# Patient Record
Sex: MALE | Race: WHITE | Employment: OTHER | URBAN - METROPOLITAN AREA
[De-identification: names, ages, dates, MRNs, and addresses within clinical notes are randomized per-mention and may not be internally consistent; named-entity substitution may affect disease eponyms.]

---

## 2017-01-08 ASSESSMENT — ACTIVITIES OF DAILY LIVING (ADL)
DO_MEMBERS_OF_YOUR_HOUSEHOLD_WEAR_SEAT_BELTS?: Y
ARE_THERE_SMOKE_DETECTORS_IN_YOUR_HOME?: Y
DO_MEMBERS_OF_YOUR_HOUSEHOLD_USE_SAFETY_HELMETS?: Y
ARE_THERE_FIREARMS_IN_YOUR_HOME?: N
DO_MEMBERS_OF_YOUR_HOUSEHOLD_USE_SUNSCREEN?: Y
ARE_THERE_CARBON_MONOXIDE_DETECTORS_IN_YOUR_HOME?: Y

## 2017-01-08 ASSESSMENT — ENCOUNTER SYMPTOMS: SNORES LOUDLY: 1

## 2017-01-11 ENCOUNTER — OFFICE VISIT (OUTPATIENT)
Dept: DERMATOLOGY | Facility: CLINIC | Age: 77
End: 2017-01-11

## 2017-01-11 ENCOUNTER — OFFICE VISIT (OUTPATIENT)
Dept: INTERNAL MEDICINE | Facility: CLINIC | Age: 77
End: 2017-01-11

## 2017-01-11 VITALS
TEMPERATURE: 97.8 F | HEART RATE: 78 BPM | BODY MASS INDEX: 25.05 KG/M2 | SYSTOLIC BLOOD PRESSURE: 121 MMHG | OXYGEN SATURATION: 97 % | DIASTOLIC BLOOD PRESSURE: 72 MMHG | HEIGHT: 70 IN | WEIGHT: 175 LBS

## 2017-01-11 DIAGNOSIS — L91.8 ACROCHORDON: ICD-10-CM

## 2017-01-11 DIAGNOSIS — I25.812 CORONARY ARTERY DISEASE INVOLVING BYPASS GRAFT OF TRANSPLANTED HEART WITHOUT ANGINA PECTORIS: ICD-10-CM

## 2017-01-11 DIAGNOSIS — L82.1 SEBORRHEIC KERATOSIS: ICD-10-CM

## 2017-01-11 DIAGNOSIS — Z85.828 HISTORY OF NONMELANOMA SKIN CANCER: ICD-10-CM

## 2017-01-11 DIAGNOSIS — Z12.83 SKIN CANCER SCREENING: ICD-10-CM

## 2017-01-11 DIAGNOSIS — L57.0 ACTINIC KERATOSES: ICD-10-CM

## 2017-01-11 DIAGNOSIS — I25.10 CORONARY ARTERY DISEASE INVOLVING NATIVE CORONARY ARTERY OF NATIVE HEART WITHOUT ANGINA PECTORIS: ICD-10-CM

## 2017-01-11 DIAGNOSIS — R07.89 OTHER CHEST PAIN: ICD-10-CM

## 2017-01-11 DIAGNOSIS — F51.01 PRIMARY INSOMNIA: Primary | ICD-10-CM

## 2017-01-11 DIAGNOSIS — L81.4 SOLAR LENTIGO: Primary | ICD-10-CM

## 2017-01-11 RX ORDER — PANTOPRAZOLE SODIUM 40 MG/1
40 TABLET, DELAYED RELEASE ORAL EVERY MORNING
Qty: 90 TABLET | Refills: 3 | Status: SHIPPED | OUTPATIENT
Start: 2017-01-11 | End: 2017-12-15

## 2017-01-11 RX ORDER — SIMVASTATIN 40 MG
40 TABLET ORAL AT BEDTIME
Qty: 90 TABLET | Refills: 3 | Status: SHIPPED | OUTPATIENT
Start: 2017-01-11 | End: 2017-12-20

## 2017-01-11 RX ORDER — METOPROLOL SUCCINATE 25 MG/1
TABLET, EXTENDED RELEASE ORAL
Qty: 45 TABLET | Refills: 3 | Status: SHIPPED | OUTPATIENT
Start: 2017-01-11 | End: 2018-01-12

## 2017-01-11 ASSESSMENT — PAIN SCALES - GENERAL
PAINLEVEL: NO PAIN (0)
PAINLEVEL: NO PAIN (0)

## 2017-01-11 NOTE — Clinical Note
"1/11/2017       RE: Jacinto Flannery  [address]  [place] [address]     Dear Colleague,    Thank you for referring your patient, Jacinto Flannery, to the [facility] DERMATOLOGY at [facility]. Please see a copy of my visit note below.    [facility] Dermatology Note      Dermatology Problem List:  1.  NMSC  -SCC, left (dorsal) forearm, s/p excision 8/18/2015  -SCC, right dorsal hand, s/p Mohs 3/18/2015    2. Compound nevus with moderate dysplasia, mid upper back  -s/p biopsy 10/16/2015    3. Actinic keratosis    -s/p cryotherapy 07/20/2016  -efudex in November 2016 (used for 19 days), pt had a very robust response  -pt given hydrocortisone 2.5% cream BID PRN due to severe response and told to stop efudex    4. Unknown skin eruption penis  -s/p triamcinolone cream initiated 3/17/2016  -s/p Nystatin initiated 3/17/2016  -s/p Mupirocin initiated 2/16/2015  -s/p Valtrex initiated 2/6/2015  -s/p biopsy showing hemorraghic scale crust and ulceration 2/3/2015    5. Drug exanthem, 2/2 to gamsulosin  -s/p biopsy showing interface dermatitis 12/29/2014  -s/p lidex initiated 1/8/2015  -s/p clobetasol initiated 12/29/2014-improved    LAst FBSE: 1/11/17      Encounter Date: Jan 11, 2017    CC:  Chief Complaint   Patient presents with     Derm Problem     Jacinto is here today for a skin check and also a follow up on some derm issues         History of Present Illness:  Mr. Jacinto Flannery is a 76 year old male who presents for evaluation of skin post Effudex treatments. Today, the pt reports his nose is feeling \"sore\" and suspects a lesion is present in that area. He is also concerned for spots on his cheek and behind his head. He is otherwise feeling well with no other skin concerns at this time.     Pt loves the outdoors. He enjoys glofing, biking, and walking. Pt is concerned for exposure to sun and regularly uses sunscreen. He would like to know if there are more precautions to " "take rather than \"living my life indoors.\"     Patient indicated that he had a very robust respose to efudex and Dr. Ortiz told him to stop and gave him a hydrocortisone salve to calm the inflammatory response.    Past Medical History:   Patient Active Problem List   Diagnosis     CAD S/P percutaneous coronary angioplasty     CAD (coronary artery disease)     BPH (benign prostatic hyperplasia)     Hyperlipidemia with target LDL less than 70     S/P coronary artery stent placement     GERD (gastroesophageal reflux disease)     S/P TURP     Dermatitis     AK (actinic keratosis)     SCC (squamous cell carcinoma)     SK (seborrheic keratosis)     EIC (epidermal inclusion cyst)     History of squamous cell carcinoma     Seborrheic keratosis     Inflamed seborrheic keratosis     History of dysplastic nevus     Varicose veins     Medication reaction, initial encounter     Past Medical History   Diagnosis Date     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     CAD (coronary artery disease)      MI 1989, CABG 1990, 11/2014 PCI to LM, LCx and RCA     BPH (benign prostatic hyperplasia)      Hyperlipidaemia LDL goal < 70      NSTEMI (non-ST elevated myocardial infarction) (H) 11/15/2014     Type 4a     S/P coronary artery stent placement 11/12/14     x 4 ANKUR's     GERD (gastroesophageal reflux disease)      Insomnia      Squamous cell carcinoma (H)      Hypertension      Skin disease      Past Surgical History   Procedure Laterality Date     C cabg, vein, three  1990     SVG-LAD in [place]     Cholecystectomy  1992     in [place]     Hernia repair Right 2010     in [place]     Heart cath stent cor w/wo ptca  11/12/2014     x 4 ANKUR's (two ostial to mid-RCA, one ostial LCx and mid-LM, one ostial LAD)     Laser ktp green light photoselective vaporization prostate N/A 1/5/2015     Procedure: LASER KTP GREEN LIGHT PHOTOSELECTIVE VAPORIZATION PROSTATE;  Surgeon: Natalie Porter MD;  Location: [facility] OR     Genitourinary " surgery       Mohs micrographic procedure         Social History:  Reviewed and unchanged.     Family History:  Not obtained at this visit.     Medications:  Current Outpatient Prescriptions   Medication Sig Dispense Refill     metoprolol (TOPROL-XL) 25 MG 24 hr tablet TAKE ONE-HALF TABLET BY MOUTH ONCE DAILY 45 tablet 0     pantoprazole (PROTONIX) 40 MG enteric coated tablet Take 1 tablet (40 mg) by mouth every morning 90 tablet 3     valACYclovir (VALTREX) 1000 mg tablet Take 1 pill twice a day for 3 days at the first sign of a cold core. 6 tablet 3     alfuzosin (UROXATRAL) 10 MG 24 hr tablet TAKE ONE TABLET BY MOUTH ONCE DAILY 90 tablet 3     eszopiclone (LUNESTA) 2 MG tablet Take 1 tablet (2 mg) by mouth nightly as needed for sleep 15 tablet 2     carBAMazepine (TEGRETOL) 200 MG tablet Take 1 tablet (200 mg) by mouth 2 times daily 60 tablet 3     simvastatin (ZOCOR) 40 MG tablet Take 1 tablet (40 mg) by mouth At Bedtime 90 tablet 3     order for DME Equipment being ordered: Orthotics for both feet, need replacement 1 Package 1     nitroglycerin (NITROSTAT) 0.4 MG SL tablet Place 1 tablet (0.4 mg) under the tongue every 5 minutes as needed for chest pain 25 tablet 3     aspirin 81 MG tablet Take 81 mg by mouth daily       Cyanocobalamin (B-12) 1000 MCG CAPS Take 1 capsule by mouth daily        fluorouracil (EFUDEX) 5 % cream Apply to to face, nose, upper lip either once a day for 4 weeks or 2-3x a week for 6 months. Wash hands afterwards. 40 g 0       Allergies   Allergen Reactions     Macrodantin [Nitrofurantoin] Rash     Sulfa Drugs Rash     Tamsulosin Rash       Review of Systems:  -Constitutional: The patient is otherwise feeling well.   -Skin: As above in HPI. No additional skin concerns. No other lesions that are spontaneously bleeding, ulcerating, or not healing.      Physical exam:  Vitals: There were no vitals taken for this visit.  GEN: This is a well-nourished, well developed male in no acute  distress  NEURO: Alert and oriented  PSYCH: in a pleasant mood, appropriate affect  SKIN: Full skin, which includes the head/face, both arms, chest, back, abdomen,both legs, genitalia and/or groin buttocks, digits and/or nails, was examined.  -Scattered brown macules on sun exposed areas.  -2 skin tags on the medial thighs over the groin area.   -There are waxy stuck on tan to brown papules on the arms and legs, face.  - There is an erythematous macule with overyling adherent scale on the nose.  -Sites of prior treated NMSC without evidence of lesional recurrence of concern.  -No other lesions of concern on areas examined.       Impression/Plan:  1. Actinic keratosis: nose (area of concern). Overall EFudex was very successful. He has 2-3 thin AK's on the tip of the nose. Will cryo.     Cryotherapy procedure note: After verbal consent and discussion of risks and benefits including but no limited to dyspigmentation/scar, blister, and pain, 1 was treated with 1-2mm freeze border for 2 cycles with liquid nitrogen. Post cryotherapy instructions were provided.   2. History of NMSC with skin cancer screening: no evidence of recurrence. Emphasized sun protection. Gave info on sun clothing and sun screen. Everything is risk and he has to match risk with his standard of living. Please take care with the sun but no need to stay indoors and be unhappy.  3. Solar lentigines    Sun precaution was advised including the use of sun screens of SPF 30 or higher, sun protective clothing, and avoidance of tanning beds.  4. Benign Neoplasms including Seborrheic keratosis and Acrochordon     Benign nature was discussed. No further intervention required at this time.       Follow-up in 6 months, earlier for new or changing lesions. Patient requests [id number] appts.      Staff Involved:  Scribe/Staff      Scribe Disclosure:   JESUS, Orlando York, am serving as a scribe to document services personally performed by Dr. Rojas Patrick, based on data  collection and the provider's statements to me.     Provider Disclosure:   I have reviewed the documentation recorded by the scribe and have edited it as needed. I have personally performed the services documented here and the documentation accurately represents those services and the decisions made by me.     Rojas Patrick MD, MS    Department of Dermatology  [facility]: Phone: [phone], Fax:[phone]  [facility]: Phone: [phone], Fax: [phone]

## 2017-01-11 NOTE — NURSING NOTE
Dermatology Rooming Note    Jacinto Flannery's goals for this visit include:   Chief Complaint   Patient presents with     Derm Problem     Jacinto is here today for a skin check and also a follow up on some derm issues     Niki Stephen MA

## 2017-01-11 NOTE — PATIENT INSTRUCTIONS
Primary Care Center Medication Refill Request Information:  * Please contact your pharmacy regarding ANY request for medication refills.  ** [facility] Prescription Fax = [phone]  * Please allow 3 business days for routine medication refills.  * Please allow 5 business days for controlled substance medication refills.     Primary Care Center Test Result notification information:  *You will be notified with in 7-10 days of your appointment day regarding the results of your test.  If you are on MyChart you will be notified as soon as the provider has reviewed the results and signed off on them.

## 2017-01-11 NOTE — NURSING NOTE
Chief Complaint   Patient presents with     Physical     Patient here for annual physical     Dulce Menon LPN at 12:12 PM on 1/11/2017.

## 2017-01-11 NOTE — MR AVS SNAPSHOT
After Visit Summary   1/11/2017    Jacinto Flannery    MRN: [id number]           Patient Information     Date Of Birth          1940        Visit Information        Provider Department      1/11/2017 9:30 AM Rojas Patrick MD [facility] Dermatology        Today's Diagnoses     Solar lentigo    -  1     Seborrheic keratosis         Telangiectasia         Acrochordon         Actinic keratoses         History of nonmelanoma skin cancer           Care Instructions    Cryotherapy    What is it?    Use of a very cold liquid, such as liquid nitrogen, to freeze and destroy abnormal skin cells that need to be removed    What should I expect?    Tenderness and redness    A small blister that might grow and fill with dark purple blood. There may be crusting.    More than one treatment may be needed if the lesions do not go away.    How do I care for the treated area?    Gently wash the area with your hands when bathing.    Use a thin layer of Vaseline to help with healing. You may use a Band-Aid.     The area should heal within 7-10 days and may leave behind a pink or lighter color.     Do not use an antibiotic or Neosporin ointment.     You may take acetaminophen (Tylenol) for pain.     Call your Doctor if you have:    Severe pain    Signs of infection (warmth, redness, cloudy yellow drainage, and or a bad smell)    Questions or concerns    Who should I call with questions?       [facility]: [phone]       [facility]: [phone]       For urgent needs outside of business hours call the [facility] at [phone]        and ask for the dermatology resident on call    -------------------------------------------------    Clothing with stated UV blocking properties can be found at the following stores below. These are just limited listed noted in the surrounding areas. These clothing can also be found online and likely at most sporting  goods stores.      [facility]  [employer]  [employer]  [place] [place]  [employer]  -----------------------------    SPF 30-50, Broad spectrum protection. Don't need water resistance if not in a wet environment or sweating. However do use water resistant ones if in wet environments. Also reapply every 2-3 hours.         Follow-ups after your visit        Follow-up notes from your care team     Return in about 6 months (around 7/11/2017).      Your next 10 appointments already scheduled     Jan 11, 2017 12:40 PM   (Arrive by 12:25 PM)   PHYSICAL with Nadine Orozco MD   [facility] ([facility])    [address] [place] [id number] [phone]            Apr 07, 2017  9:45 AM   (Arrive by 9:30 AM)   Return Visit with Natalie Porter MD   [facility] ([facility])    [address] [place] [id number] [phone]              Who to contact     Please call your clinic at [phone] to:    Ask questions about your health    Make or cancel appointments    Discuss your medicines    Learn about your test results    Speak to your doctor   If you have compliments or concerns about an experience at your clinic, or if you wish to file a complaint, please contact [facility] Patient Relations at [phone] or email us at [email or web address]         Additional Information About Your Visit        Click Securityhart Information     [employer] gives you secure access to your electronic health record. If you see a primary care provider, you can also send messages to your care team and make appointments. If you have questions, please call your primary care clinic.  If you do not have a primary care provider, please call [phone] and they will assist you.      [employer] is an  electronic gateway that provides easy, online access to your medical records. With [employer], you can request a clinic appointment, read your test results, renew a prescription or communicate with your care team.     To access your existing account, please contact your [facility] or call [phone] for assistance.        Care EveryWhere ID     This is your Care EveryWhere ID. This could be used by other organizations to access your [place] medical records  [id number]         Blood Pressure from Last 3 Encounters:   11/02/16 118/71   06/08/16 118/75   05/09/16 114/61    Weight from Last 3 Encounters:   06/08/16 77.111 kg (170 lb)   05/09/16 77.837 kg (171 lb 9.6 oz)   04/01/16 76.204 kg (168 lb)              We Performed the Following     DESTRUCT PREMALIGNANT LESION, 2-14     DESTRUCT PREMALIGNANT LESION, FIRST          Today's Medication Changes          These changes are accurate as of: 1/11/17  9:54 AM.  If you have any questions, ask your nurse or doctor.               These medicines have changed or have updated prescriptions.        Dose/Directions    alfuzosin 10 MG 24 hr tablet   Commonly known as:  UROXATRAL   This may have changed:  Another medication with the same name was removed. Continue taking this medication, and follow the directions you see here.   Used for:  Benign localized hyperplasia of prostate with urinary obstruction and other lower urinary tract symptoms (LUTS)(600.21)   Changed by:  Natalie Porter MD        TAKE ONE TABLET BY MOUTH ONCE DAILY   Quantity:  90 tablet   Refills:  3         Stop taking these medicines if you haven't already. Please contact your care team if you have questions.     acyclovir 5 % ointment   Commonly known as:  ZOVIRAX   Stopped by:  Rojas Patrick MD           Blood Pressure Monitor/L Cuff Misc   Stopped by:  Rojas Patrick MD           hydrocortisone 2.5 % ointment   Stopped by:  Rojas Patrcik MD            nystatin-triamcinolone cream   Commonly known as:  MYCOLOG II   Stopped by:  Rojas Patrick MD           SENNA S 8.6-50 MG per tablet   Generic drug:  senna-docusate   Stopped by:  Rojas Patrick MD                    Primary Care Provider Office Phone # Fax [place] F[facility] Orozco [phone]       [address]  [facility] [address]        Thank you!     Thank you for choosing [facility] DERMATOLOGY  for your care. Our goal is always to provide you with excellent care. Hearing back from our patients is one way we can continue to improve our services. Please take a few minutes to complete the written survey that you may receive in the mail after your visit with us. Thank you!             Your Updated Medication List - Protect others around you: Learn how to safely use, store and throw away your medicines at www.disposemymeds.org.          This list is accurate as of: 1/11/17  9:54 AM.  Always use your most recent med list.                   Brand Name Dispense Instructions for use    alfuzosin 10 MG 24 hr tablet    UROXATRAL    90 tablet    TAKE ONE TABLET BY MOUTH ONCE DAILY       aspirin 81 MG tablet      Take 81 mg by mouth daily       B-12 1000 MCG Caps      Take 1 capsule by mouth daily       carBAMazepine 200 MG tablet    TEGretol    60 tablet    Take 1 tablet (200 mg) by mouth 2 times daily       eszopiclone 2 MG tablet    LUNESTA    15 tablet    Take 1 tablet (2 mg) by mouth nightly as needed for sleep       fluorouracil 5 % cream    EFUDEX    40 g    Apply to to face, nose, upper lip either once a day for 4 weeks or 2-3x a week for 6 months. Wash hands afterwards.       metoprolol 25 MG 24 hr tablet    TOPROL-XL    45 tablet    TAKE ONE-HALF TABLET BY MOUTH ONCE DAILY       nitroglycerin 0.4 MG sublingual tablet    NITROSTAT    25 tablet    Place 1 tablet (0.4 mg) under the tongue every 5 minutes as needed for chest pain       order for DME      1 Package    Equipment being ordered: Orthotics for both feet, need replacement       pantoprazole 40 MG EC tablet    PROTONIX    90 tablet    Take 1 tablet (40 mg) by mouth every morning       simvastatin 40 MG tablet    ZOCOR    90 tablet    Take 1 tablet (40 mg) by mouth At Bedtime       valACYclovir 1000 mg tablet    VALTREX    6 tablet    Take 1 pill twice a day for 3 days at the first sign of a cold core.

## 2017-01-11 NOTE — PATIENT INSTRUCTIONS
Cryotherapy    What is it?    Use of a very cold liquid, such as liquid nitrogen, to freeze and destroy abnormal skin cells that need to be removed    What should I expect?    Tenderness and redness    A small blister that might grow and fill with dark purple blood. There may be crusting.    More than one treatment may be needed if the lesions do not go away.    How do I care for the treated area?    Gently wash the area with your hands when bathing.    Use a thin layer of Vaseline to help with healing. You may use a Band-Aid.     The area should heal within 7-10 days and may leave behind a pink or lighter color.     Do not use an antibiotic or Neosporin ointment.     You may take acetaminophen (Tylenol) for pain.     Call your Doctor if you have:    Severe pain    Signs of infection (warmth, redness, cloudy yellow drainage, and or a bad smell)    Questions or concerns    Who should I call with questions?       [facility]: [phone]       [facility]: [phone]       For urgent needs outside of business hours call the [facility] at [phone]        and ask for the dermatology resident on call    -------------------------------------------------    Clothing with stated UV blocking properties can be found at the following stores below. These are just limited listed noted in the surrounding areas. These clothing can also be found online and likely at most sporting goods stores.      [employer]  [employer]  [employer]  [employer]  [employer]  [employer]  -----------------------------    SPF 30-50, Broad spectrum protection. Don't need water resistance if not in a wet environment or sweating. However do use water resistant ones if in wet environments. Also reapply every 2-3 hours.

## 2017-01-11 NOTE — MR AVS SNAPSHOT
After Visit Summary   1/11/2017    Jacinto Flannery    MRN: [id number]           Patient Information     Date Of Birth          1940        Visit Information        Provider Department      1/11/2017 12:40 PM Nadine Alonso MD [facility]        Today's Diagnoses     Primary insomnia    -  1     Coronary artery disease involving bypass graft of transplanted heart without angina pectoris         Other chest pain         Coronary artery disease involving native coronary artery of native heart without angina pectoris           Care Instructions    Primary Care Center Medication Refill Request Information:  * Please contact your pharmacy regarding ANY request for medication refills.  ** [facility] Prescription Fax = [phone]  * Please allow 3 business days for routine medication refills.  * Please allow 5 business days for controlled substance medication refills.     Primary Care Center Test Result notification information:  *You will be notified with in 7-10 days of your appointment day regarding the results of your test.  If you are on MyChart you will be notified as soon as the provider has reviewed the results and signed off on them.          Follow-ups after your visit        Your next 10 appointments already scheduled     Apr 07, 2017  9:45 AM   (Arrive by 9:30 AM)   Return Visit with Natalie Porter MD   [facility] ([facility])    [address] [place] [id number] [phone]              Who to contact     Please call your clinic at [phone] to:    Ask questions about your health    Make or cancel appointments    Discuss your medicines    Learn about your test results    Speak to your doctor   If you have compliments or concerns about an experience at your clinic, or if you wish to file a complaint, please contact [facility] Patient  "Relations at [phone] or email us at [email or web address]         Additional Information About Your Visit        [employer] Information     [employer] gives you secure access to your electronic health record. If you see a primary care provider, you can also send messages to your care team and make appointments. If you have questions, please call your primary care clinic.  If you do not have a primary care provider, please call [phone] and they will assist you.      [employer] is an electronic gateway that provides easy, online access to your medical records. With [employer], you can request a clinic appointment, read your test results, renew a prescription or communicate with your care team.     To access your existing account, please contact your [facility] or call [phone] for assistance.        Care EveryWhere ID     This is your Care EveryWhere ID. This could be used by other organizations to access your [place] medical records  [id number]        Your Vitals Were     Pulse Temperature Height BMI (Body Mass Index) Pulse Oximetry       78 97.8  F (36.6  C) (Oral) 1.78 m (5' 10.08\") 25.05 kg/m2 97%        Blood Pressure from Last 3 Encounters:   01/11/17 121/72   11/02/16 118/71   06/08/16 118/75    Weight from Last 3 Encounters:   01/11/17 79.379 kg (175 lb)   06/08/16 77.111 kg (170 lb)   05/09/16 77.837 kg (171 lb 9.6 oz)              Today, you had the following     No orders found for display         Today's Medication Changes          These changes are accurate as of: 1/11/17  1:07 PM.  If you have any questions, ask your nurse or doctor.               These medicines have changed or have updated prescriptions.        Dose/Directions    alfuzosin 10 MG 24 hr tablet   Commonly known as:  UROXATRAL   This may have changed:  Another medication with the same name was removed. Continue taking this medication, and follow the directions you see here.   Used for:  " Benign localized hyperplasia of prostate with urinary obstruction and other lower urinary tract symptoms (LUTS)(600.21)   Changed by:  Natalie Porter MD        TAKE ONE TABLET BY MOUTH ONCE DAILY   Quantity:  90 tablet   Refills:  3       metoprolol 25 MG 24 hr tablet   Commonly known as:  TOPROL-XL   This may have changed:  See the new instructions.   Used for:  Coronary artery disease involving bypass graft of transplanted heart without angina pectoris   Changed by:  Nadine Alonso MD        TAKE ONE-HALF TABLET BY MOUTH ONCE DAILY   Quantity:  45 tablet   Refills:  3         Stop taking these medicines if you haven't already. Please contact your care team if you have questions.     acyclovir 5 % ointment   Commonly known as:  ZOVIRAX   Stopped by:  oRjas Patrick MD           Blood Pressure Monitor/L Cuff Misc   Stopped by:  Rojas Patrick MD           hydrocortisone 2.5 % ointment   Stopped by:  Rojas Patrick MD           nystatin-triamcinolone cream   Commonly known as:  MYCOLOG II   Stopped by:  Rojas Patrick MD           SENNA S 8.6-50 MG per tablet   Generic drug:  senna-docusate   Stopped by:  Rojas Patrick MD                Where to get your medicines      These medications were sent to [facility] [address] [place] [address]  [address] [facility] [address]     Phone:  [phone]    - metoprolol 25 MG 24 hr tablet  - pantoprazole 40 MG EC tablet  - simvastatin 40 MG tablet             Primary Care Provider Office Phone # Fax #    Nadine Orozco [phone]       [address] [address]        Thank you!     Thank you for choosing [facility]  for your care. Our goal is always to provide you with excellent care. Hearing back from our patients is one way we can continue to improve our services. Please take a few minutes to complete the  written survey that you may receive in the mail after your visit with us. Thank you!             Your Updated Medication List - Protect others around you: Learn how to safely use, store and throw away your medicines at www.disposemymeds.org.          This list is accurate as of: 1/11/17  1:07 PM.  Always use your most recent med list.                   Brand Name Dispense Instructions for use    alfuzosin 10 MG 24 hr tablet    UROXATRAL    90 tablet    TAKE ONE TABLET BY MOUTH ONCE DAILY       aspirin 81 MG tablet      Take 81 mg by mouth daily       B-12 1000 MCG Caps      Take 1 capsule by mouth daily       carBAMazepine 200 MG tablet    TEGretol    60 tablet    Take 1 tablet (200 mg) by mouth 2 times daily       eszopiclone 2 MG tablet    LUNESTA    15 tablet    Take 1 tablet (2 mg) by mouth nightly as needed for sleep       fluorouracil 5 % cream    EFUDEX    40 g    Apply to to face, nose, upper lip either once a day for 4 weeks or 2-3x a week for 6 months. Wash hands afterwards.       metoprolol 25 MG 24 hr tablet    TOPROL-XL    45 tablet    TAKE ONE-HALF TABLET BY MOUTH ONCE DAILY       nitroglycerin 0.4 MG sublingual tablet    NITROSTAT    25 tablet    Place 1 tablet (0.4 mg) under the tongue every 5 minutes as needed for chest pain       order for DME     1 Package    Equipment being ordered: Orthotics for both feet, need replacement       pantoprazole 40 MG EC tablet    PROTONIX    90 tablet    Take 1 tablet (40 mg) by mouth every morning       simvastatin 40 MG tablet    ZOCOR    90 tablet    Take 1 tablet (40 mg) by mouth At Bedtime       valACYclovir 1000 mg tablet    VALTREX    6 tablet    Take 1 pill twice a day for 3 days at the first sign of a cold core.

## 2017-01-12 NOTE — PROGRESS NOTES
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     Here for routine physical exam.  Feels well, no particular concerns, other than snoring.  But he sleeps well and feels rested during the day other than he is recently jet lagged from a flight from [place].      Past, family and social history unchanged per Epic.    Answers for HPI/ROS submitted by the patient on 1/8/2017   Annual Exam:  General Symptoms: No  Skin Symptoms: No  HENT Symptoms: No  EYE SYMPTOMS: No  HEART SYMPTOMS: No  LUNG SYMPTOMS: Yes (snoring)  INTESTINAL SYMPTOMS: No  URINARY SYMPTOMS: No  REPRODUCTIVE SYMPTOMS: No  SKELETAL SYMPTOMS: No  BLOOD SYMPTOMS: No  NERVOUS SYSTEM SYMPTOMS: No  MENTAL HEALTH SYMPTOMS: No  Snoring: Yes  Frequency of exercise:: 4-5 days/week  Duration of exercise:: 30-45 minutes    PHYSICAL EXAM:  B/P: 121/72, T: 97.8, P: 78  Constitutional: no distress, comfortable, pleasant   Eyes: anicteric, normal extra-ocular movements   Ears, Nose and Throat: tympanic membranes clear, nose clear and free of lesions, throat clear, neck supple with full range of motion, no thyromegaly.   Cardiovascular: regular rate and rhythm, normal S1 and S2, no murmurs, rubs or gallops, peripheral pulses full and symmetric   Respiratory: clear to auscultation, no wheezes or crackles, normal breath sounds   Gastrointestinal: positive bowel sounds, nontender, no hepatosplenomegaly, no masses   Musculoskeletal: knees full range of motion, no effusion, no joint space narrowing  Skin: no concerning lesions, no jaundice   Neurological: normal gait, no tremor   Psychological:  appropriate mood   Lymphatic: no cervical lymphadenopathy and no pedal edema    A/P Jacinto was seen today for physical and medication refills.  Currently, not due for any routine HCM.      Primary insomnia  --advised first to take OTC melatonin, then try lunesta if not effective    Coronary artery disease  -     metoprolol (TOPROL-XL) 25 MG 24 hr tablet; TAKE ONE-HALF TABLET BY MOUTH ONCE DAILY  -     simvastatin (ZOCOR) 40 MG tablet; Take 1 tablet (40 mg) by mouth At Bedtime    Other chest pain/GERD  -     pantoprazole (PROTONIX) 40 MG EC tablet; Take 1 tablet (40 mg) by mouth every morning      Ernesto Aponte MD

## 2017-03-23 ASSESSMENT — ENCOUNTER SYMPTOMS
BOWEL INCONTINENCE: 0
RECTAL PAIN: 0
CONSTIPATION: 0
DIARRHEA: 0
NAUSEA: 0
BLOOD IN STOOL: 0
JAUNDICE: 0
VOMITING: 0
RECTAL BLEEDING: 0
ABDOMINAL PAIN: 0
BLOATING: 0
HEARTBURN: 1

## 2017-03-27 ENCOUNTER — PRE VISIT (OUTPATIENT)
Dept: UROLOGY | Facility: CLINIC | Age: 77
End: 2017-03-27

## 2017-03-27 ASSESSMENT — ENCOUNTER SYMPTOMS: HEARTBURN: 1

## 2017-03-27 ASSESSMENT — ACTIVITIES OF DAILY LIVING (ADL)
ARE_THERE_FIREARMS_IN_YOUR_HOME?: N
ARE_THERE_CARBON_MONOXIDE_DETECTORS_IN_YOUR_HOME?: Y
ARE_THERE_SMOKE_DETECTORS_IN_YOUR_HOME?: Y
DO_MEMBERS_OF_YOUR_HOUSEHOLD_WEAR_SEAT_BELTS?: Y
DO_MEMBERS_OF_YOUR_HOUSEHOLD_USE_SAFETY_HELMETS?: Y
DO_MEMBERS_OF_YOUR_HOUSEHOLD_USE_SUNSCREEN?: Y

## 2017-03-27 NOTE — TELEPHONE ENCOUNTER
Patient is coming in to see  for BPH, called patient to please come with a full bladder for a flow/PVR.

## 2017-04-05 ENCOUNTER — PRE VISIT (OUTPATIENT)
Dept: CARDIOLOGY | Facility: CLINIC | Age: 77
End: 2017-04-05

## 2017-04-05 NOTE — TELEPHONE ENCOUNTER
Echo: 11/15/2014  Interpretation Summary  The Ejection Fraction is estimated at 55-60%. Cannot exclude basal   inferior   hypokinesis. No pericardial effusion is present.     Echo: 2/13/2014 ([facility]: [place], [place])  By 2D echo, LV function appears to be normal, consistent with estimated EF of 60-65%. There is mild hypokinesis of the apical septum. No other specific wall motion abnormalities are seen. There is no pericardial effusion noted on this study.      Impression:  1. Normal LV function, estimated EF 60-65%.  2. Specific wall motion abnormality as described above.  3. RA enlargement.  4. Mild RV enlargement with preserved RV function.  5. Aortic sclerosis without stenosis.  6. Mild mitral regurgitation, tricuspid regurgitation, and pulmonic insufficiency.               CCL: 11/12/2014  Summary of Findings:  Dominance: right.  LM: 20% ostial lesion, 90% mid-LM stenosis.   LAD: 100% ostial LAD occlusion.   SVG to mid LAD: patent.  LCx: 90% ostial stenosis. Gives OM1 branch before continuing and terminating in the left AV groove.  RCA: Dominant, 80% proximal stenosis and 90% mid RCA stenosis. RCA gives a moderate-sized RV marginal and continues to the RPAV. It branches into the RPDA and 3 small-sized RPLAs, all of which have mild diffuse disease ~25%.     Impression:  Three vessel coronary artery disease.  Patent SVG-LAD .  S/p successful PCI ANKUR x2 of ostial to mid- RCA.  S/p successful PCI DESx1 of ostial LCx and mid LM.  S/p successful PCI DESx1 of ostial LM.     Recommendations:   Ticagrelor 90 mg bid along with aspirin 81 mg daily starting tomorrow 11/13/2014 for 1 year. Then aspirin 81 mg qday alone lifelong.  Bedrest for 4 hours after sheath removal.  Plan to d/c home after bedrest complete and no groin hematoma observed overnight.   Follow up with the referring physician in 1-2 months.

## 2017-04-06 ENCOUNTER — OFFICE VISIT (OUTPATIENT)
Dept: CARDIOLOGY | Facility: CLINIC | Age: 77
End: 2017-04-06
Attending: INTERNAL MEDICINE
Payer: MEDICARE

## 2017-04-06 VITALS
SYSTOLIC BLOOD PRESSURE: 110 MMHG | HEART RATE: 70 BPM | BODY MASS INDEX: 25.01 KG/M2 | WEIGHT: 174.7 LBS | HEIGHT: 70 IN | OXYGEN SATURATION: 99 % | DIASTOLIC BLOOD PRESSURE: 63 MMHG

## 2017-04-06 DIAGNOSIS — I25.10 CORONARY ARTERY DISEASE INVOLVING NATIVE CORONARY ARTERY OF NATIVE HEART WITHOUT ANGINA PECTORIS: Primary | ICD-10-CM

## 2017-04-06 PROCEDURE — 99213 OFFICE O/P EST LOW 20 MIN: CPT | Mod: ZF

## 2017-04-06 PROCEDURE — 99213 OFFICE O/P EST LOW 20 MIN: CPT | Performed by: INTERNAL MEDICINE

## 2017-04-06 ASSESSMENT — PAIN SCALES - GENERAL: PAINLEVEL: NO PAIN (0)

## 2017-04-06 NOTE — PATIENT INSTRUCTIONS
Thank you for your visit today.  Please call me with any questions or concerns.   Tomasz Urias RN  Cardiology Care Coordinator  [phone], press option 1 then option 3

## 2017-04-06 NOTE — LETTER
4/6/2017      RE: Jacinto Flannery  [address]  [place] [address]       Dear Colleague,    Thank you for the opportunity to participate in the care of your patient, Jacinto Flannery, at the [facility]. Please see a copy of my visit note below.    HPI:     Mr. Jacinto Flannery is a 76 year old male with a past medical history of CAD, including a prior MI in 1989 and 3x CABG in 1990 in [place] (no operative details available).  The patient presented with chest pains and underwent coronary angiography and stenting of the RCA , LM and LCx in 11/14.    He has done well since then.    Doing well as far as his heart is concerned. No chest pains, palpitations or syncope.           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     Hyperlipidaemia LDL goal < 70      Hypertension      Insomnia      NSTEMI (non-ST elevated myocardial infarction) (H) 11/15/2014    Type 4a     S/P coronary artery stent placement 11/12/14    x 4 ANKUR's     Skin disease      Squamous cell carcinoma (H)        CURRENT MEDICATIONS:  Current Outpatient Prescriptions   Medication Sig Dispense Refill     metoprolol (TOPROL-XL) 25 MG 24 hr tablet TAKE ONE-HALF TABLET BY MOUTH ONCE DAILY 45 tablet 3     pantoprazole (PROTONIX) 40 MG EC tablet Take 1 tablet (40 mg) by mouth every morning 90 tablet 3     simvastatin (ZOCOR) 40 MG tablet Take 1 tablet (40 mg) by mouth At Bedtime 90 tablet 3     fluorouracil (EFUDEX) 5 % cream Apply to to face, nose, upper lip either once a day for 4 weeks or 2-3x a week for 6 months. Wash hands afterwards. 40 g 0     valACYclovir (VALTREX) 1000 mg tablet Take 1 pill twice a day for 3 days at the first sign of a cold core. 6 tablet 3      alfuzosin (UROXATRAL) 10 MG 24 hr tablet TAKE ONE TABLET BY MOUTH ONCE DAILY 90 tablet 3     eszopiclone (LUNESTA) 2 MG tablet Take 1 tablet (2 mg) by mouth nightly as needed for sleep 15 tablet 2     carBAMazepine (TEGRETOL) 200 MG tablet Take 1 tablet (200 mg) by mouth 2 times daily 60 tablet 3     order for DME Equipment being ordered: Orthotics for both feet, need replacement 1 Package 1     nitroglycerin (NITROSTAT) 0.4 MG SL tablet Place 1 tablet (0.4 mg) under the tongue every 5 minutes as needed for chest pain 25 tablet 3     aspirin 81 MG tablet Take 81 mg by mouth daily       Cyanocobalamin (B-12) 1000 MCG CAPS Take 1 capsule by mouth daily          PAST SURGICAL HISTORY:  Past Surgical History:   Procedure Laterality Date     C CABG, VEIN, THREE  1990    SVG-LAD in [place]     CHOLECYSTECTOMY  1992    in [place]     GENITOURINARY SURGERY       HEART CATH STENT COR W/WO PTCA  11/12/2014    x 4 ANKUR's (two ostial to mid-RCA, one ostial LCx and mid-LM, one ostial LAD)     HERNIA REPAIR Right 2010    in [place]     LASER KTP GREEN LIGHT PHOTOSELECTIVE VAPORIZATION PROSTATE N/A 1/5/2015    Procedure: LASER KTP GREEN LIGHT PHOTOSELECTIVE VAPORIZATION PROSTATE;  Surgeon: Natalie Porter MD;  Location: [facility] OR     MOHS MICROGRAPHIC PROCEDURE         ALLERGIES     Allergies   Allergen Reactions     Macrodantin [Nitrofurantoin] Rash     Sulfa Drugs Rash     Tamsulosin Rash       FAMILY HISTORY:  Family History   Problem Relation Age of Onset     CANCER Father      Stomach     HEART DISEASE Mother      HEART DISEASE Maternal Grandmother      Skin Cancer No family hx of        SOCIAL HISTORY:  History     Social History     Marital Status:      Spouse Name: N/A     Number of Children: N/A     Years of Education: N/A     Social History Main Topics     Smoking status: Never Smoker      Smokeless tobacco: Never Used     Alcohol Use: Yes      Comment: rarely     Drug Use: No     Sexual Activity:      "Partners: Female     Other Topics Concern     None     Social History Narrative       ROS:   Constitutional: No fever, chills, or sweats. No weight gain/loss   ENT: No visual disturbance, ear ache, epistaxis, sore throat  Allergies/Immunologic: Negative.   Respiratory: No cough, hemoptysia  Cardiovascular: As per HPI  GI: No nausea, vomiting, hematemesis, melena, or hematochezia  : No urinary frequency, dysuria, or hematuria  Integument: Negative  Psychiatric: Negative  Neuro: Negative  Endocrinology: Negative   Musculoskeletal: Negative    EXAM:  Ht 1.778 m (5' 10\")  Wt 79.2 kg (174 lb 11.2 oz)  BMI 25.07 kg/m2  In general, the patient is a pleasant male in no apparent distress.    HEENT: Sclerae white, not injected.  Nares clear.  Pharynx without erythema or exudate.  Dentition intact.     Neck: No adenopathy.  No thyromegaly. Carotids +4/4 bilaterally without bruits.  No jugular venous distension.    Heart: RRR. Normal S1, S2 splits physiologically. No murmur, rub, click, or gallop. The PMI is in the 5th ICS in the midclavicular line. There is no heave.     Lungs: CTA.  No ronchi, wheezes, rales.  No dullness to percussion.    Abdomen: Soft, nontender, nondistended. No organomegaly.  No bruits.    Extremities: No clubbing, cyanosis, or edema.  The pulses are +4/4 at the radial, brachial, femoral, popliteal, DP, and PT sites bilaterally.  No bruits are noted.  Neurologic: Alert and oriented to person/place/time, normal speech, gait and affect  Skin: No petechiae, purpura or rash.          Labs:  LIPID RESULTS:  Lab Results   Component Value Date    CHOL 151 08/18/2016    HDL 45 08/18/2016    LDL 78 08/18/2016    TRIG 142 08/18/2016    CHOLHDLRATIO 3.1 06/08/2015       LIVER ENZYME RESULTS:  Lab Results   Component Value Date    AST 26 06/08/2015       CBC RESULTS:  Lab Results   Component Value Date    WBC 5.5 04/01/2016    RBC 4.64 04/01/2016    HGB 14.1 04/01/2016    HCT 41.6 04/01/2016    MCV 90 04/01/2016 "    MCH 30.4 04/01/2016    MCHC 33.9 04/01/2016    RDW 13.2 04/01/2016     (L) 04/01/2016       BMP RESULTS:  Lab Results   Component Value Date     04/01/2016    POTASSIUM 4.8 04/01/2016    CHLORIDE 103 04/01/2016    CO2 30 04/01/2016    ANIONGAP 3 04/01/2016    GLC 88 04/01/2016    BUN 18 04/01/2016    CR 0.92 04/01/2016    GFRESTIMATED 81 04/01/2016    GFRESTBLACK >90   GFR Calc   04/01/2016    JAMIE 8.7 04/01/2016        A1C RESULTS:  No results found for: A1C    INR RESULTS:  Lab Results   Component Value Date    INR 1.13 04/01/2016    INR 1.12 02/09/2015       Procedures:  Echo: 11/15/2014  Interpretation Summary  The Ejection Fraction is estimated at 55-60%. Cannot exclude basal   inferior   hypokinesis. No pericardial effusion is present.    Echo: 2/13/2014 ([facility]: [place], [place])  By 2D echo, LV function appears to be normal, consistent with estimated EF of 60-65%. There is mild hypokinesis of the apical septum. No other specific wall motion abnormalities are seen. There is no pericardial effusion noted on this study.     Impression:  1. Normal LV function, estimated EF 60-65%.  2. Specific wall motion abnormality as described above.  3. RA enlargement.  4. Mild RV enlargement with preserved RV function.  5. Aortic sclerosis without stenosis.  6. Mild mitral regurgitation, tricuspid regurgitation, and pulmonic insufficiency.            CCL: 11/12/2014  Summary of Findings:  Dominance: right.  LM: 20% ostial lesion, 90% mid-LM stenosis.   LAD: 100% ostial LAD occlusion.   SVG to mid LAD: patent.  LCx: 90% ostial stenosis. Gives OM1 branch before continuing and terminating in the left AV groove.  RCA: Dominant, 80% proximal stenosis and 90% mid RCA stenosis. RCA gives a moderate-sized RV marginal and continues to the RPAV. It branches into the RPDA and 3 small-sized RPLAs, all of which have mild diffuse disease ~25%.    Impression:  Three vessel coronary  "artery disease.  Patent SVG-LAD .  S/p successful PCI ANKUR x2 of ostial to mid- RCA.  S/p successful PCI DESx1 of ostial LCx and mid LM.  S/p successful PCI DESx1 of ostial LM.    Recommendations:   Ticagrelor 90 mg bid along with aspirin 81 mg daily starting tomorrow 11/13/2014 for 1 year. Then aspirin 81 mg qday alone lifelong.  Bedrest for 4 hours after sheath removal.  Plan to d/c home after bedrest complete and no groin hematoma observed overnight.   Follow up with the referring physician in 1-2 months.      Assessment and Plan:     \"Eloy\" is doing well after his  PCI and stenting on 11/12/14. He had a coronary angiogram after a recent episode of chest pain. This was unchanged since 2014. From a cardiac standpoint he is stable. Continue with current medications and review in two years.    Sincerely,     Jen Olivera MD      CC  SELF, REFERRED          "

## 2017-04-06 NOTE — NURSING NOTE
Med Reconcile: Reviewed and verified all current medications with the patient. The updated medication list was printed and given to the patient.  Return Appointment: Follow up in 2 years.Patient given instructions regarding scheduling next clinic visit. Patient demonstrated understanding of this information and agreed to call with further questions or concerns.  Patient stated he understood all health information given and agreed to call with further questions or concerns.

## 2017-04-06 NOTE — PROGRESS NOTES
HPI:     Mr. Jacinto Flannery is a 76 year old male with a past medical history of CAD, including a prior MI in 1989 and 3x CABG in 1990 in [place] (no operative details available).  The patient presented with chest pains and underwent coronary angiography and stenting of the RCA , LM and LCx in 11/14.    He has done well since then.    Doing well as far as his heart is concerned. No chest pains, palpitations or syncope.           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     Hyperlipidaemia LDL goal < 70      Hypertension      Insomnia      NSTEMI (non-ST elevated myocardial infarction) (H) 11/15/2014    Type 4a     S/P coronary artery stent placement 11/12/14    x 4 ANKUR's     Skin disease      Squamous cell carcinoma (H)        CURRENT MEDICATIONS:  Current Outpatient Prescriptions   Medication Sig Dispense Refill     metoprolol (TOPROL-XL) 25 MG 24 hr tablet TAKE ONE-HALF TABLET BY MOUTH ONCE DAILY 45 tablet 3     pantoprazole (PROTONIX) 40 MG EC tablet Take 1 tablet (40 mg) by mouth every morning 90 tablet 3     simvastatin (ZOCOR) 40 MG tablet Take 1 tablet (40 mg) by mouth At Bedtime 90 tablet 3     fluorouracil (EFUDEX) 5 % cream Apply to to face, nose, upper lip either once a day for 4 weeks or 2-3x a week for 6 months. Wash hands afterwards. 40 g 0     valACYclovir (VALTREX) 1000 mg tablet Take 1 pill twice a day for 3 days at the first sign of a cold core. 6 tablet 3     alfuzosin (UROXATRAL) 10 MG 24 hr tablet TAKE ONE TABLET BY MOUTH ONCE DAILY 90 tablet 3     eszopiclone (LUNESTA) 2 MG tablet Take 1 tablet (2 mg) by mouth nightly as needed for sleep 15 tablet 2     carBAMazepine (TEGRETOL) 200 MG tablet Take 1 tablet (200 mg) by mouth 2 times daily 60 tablet 3      order for DME Equipment being ordered: Orthotics for both feet, need replacement 1 Package 1     nitroglycerin (NITROSTAT) 0.4 MG SL tablet Place 1 tablet (0.4 mg) under the tongue every 5 minutes as needed for chest pain 25 tablet 3     aspirin 81 MG tablet Take 81 mg by mouth daily       Cyanocobalamin (B-12) 1000 MCG CAPS Take 1 capsule by mouth daily          PAST SURGICAL HISTORY:  Past Surgical History:   Procedure Laterality Date     C CABG, VEIN, THREE  1990    SVG-LAD in [place]     CHOLECYSTECTOMY  1992    in [place]     GENITOURINARY SURGERY       HEART CATH STENT COR W/WO PTCA  11/12/2014    x 4 ANKUR's (two ostial to mid-RCA, one ostial LCx and mid-LM, one ostial LAD)     HERNIA REPAIR Right 2010    in [place]     LASER KTP GREEN LIGHT PHOTOSELECTIVE VAPORIZATION PROSTATE N/A 1/5/2015    Procedure: LASER KTP GREEN LIGHT PHOTOSELECTIVE VAPORIZATION PROSTATE;  Surgeon: Natalie Porter MD;  Location: [facility]     MOHS MICROGRAPHIC PROCEDURE         ALLERGIES     Allergies   Allergen Reactions     Macrodantin [Nitrofurantoin] Rash     Sulfa Drugs Rash     Tamsulosin Rash       FAMILY HISTORY:  Family History   Problem Relation Age of Onset     CANCER Father      Stomach     HEART DISEASE Mother      HEART DISEASE Maternal Grandmother      Skin Cancer No family hx of        SOCIAL HISTORY:  History     Social History     Marital Status:      Spouse Name: N/A     Number of Children: N/A     Years of Education: N/A     Social History Main Topics     Smoking status: Never Smoker      Smokeless tobacco: Never Used     Alcohol Use: Yes      Comment: rarely     Drug Use: No     Sexual Activity:     Partners: Female     Other Topics Concern     None     Social History Narrative       ROS:   Constitutional: No fever, chills, or sweats. No weight gain/loss   ENT: No visual disturbance, ear ache, epistaxis, sore throat  Allergies/Immunologic: Negative.   Respiratory: No cough, hemoptysia  Cardiovascular:  "As per HPI  GI: No nausea, vomiting, hematemesis, melena, or hematochezia  : No urinary frequency, dysuria, or hematuria  Integument: Negative  Psychiatric: Negative  Neuro: Negative  Endocrinology: Negative   Musculoskeletal: Negative    EXAM:  Ht 1.778 m (5' 10\")  Wt 79.2 kg (174 lb 11.2 oz)  BMI 25.07 kg/m2  In general, the patient is a pleasant male in no apparent distress.    HEENT: Sclerae white, not injected.  Nares clear.  Pharynx without erythema or exudate.  Dentition intact.     Neck: No adenopathy.  No thyromegaly. Carotids +4/4 bilaterally without bruits.  No jugular venous distension.    Heart: RRR. Normal S1, S2 splits physiologically. No murmur, rub, click, or gallop. The PMI is in the 5th ICS in the midclavicular line. There is no heave.     Lungs: CTA.  No ronchi, wheezes, rales.  No dullness to percussion.    Abdomen: Soft, nontender, nondistended. No organomegaly.  No bruits.    Extremities: No clubbing, cyanosis, or edema.  The pulses are +4/4 at the radial, brachial, femoral, popliteal, DP, and PT sites bilaterally.  No bruits are noted.  Neurologic: Alert and oriented to person/place/time, normal speech, gait and affect  Skin: No petechiae, purpura or rash.          Labs:  LIPID RESULTS:  Lab Results   Component Value Date    CHOL 151 08/18/2016    HDL 45 08/18/2016    LDL 78 08/18/2016    TRIG 142 08/18/2016    CHOLHDLRATIO 3.1 06/08/2015       LIVER ENZYME RESULTS:  Lab Results   Component Value Date    AST 26 06/08/2015       CBC RESULTS:  Lab Results   Component Value Date    WBC 5.5 04/01/2016    RBC 4.64 04/01/2016    HGB 14.1 04/01/2016    HCT 41.6 04/01/2016    MCV 90 04/01/2016    MCH 30.4 04/01/2016    MCHC 33.9 04/01/2016    RDW 13.2 04/01/2016     (L) 04/01/2016       BMP RESULTS:  Lab Results   Component Value Date     04/01/2016    POTASSIUM 4.8 04/01/2016    CHLORIDE 103 04/01/2016    CO2 30 04/01/2016    ANIONGAP 3 04/01/2016    GLC 88 04/01/2016    BUN 18 " 04/01/2016    CR 0.92 04/01/2016    GFRESTIMATED 81 04/01/2016    GFRESTBLACK >90   GFR Calc   04/01/2016    JAMIE 8.7 04/01/2016        A1C RESULTS:  No results found for: A1C    INR RESULTS:  Lab Results   Component Value Date    INR 1.13 04/01/2016    INR 1.12 02/09/2015       Procedures:  Echo: 11/15/2014  Interpretation Summary  The Ejection Fraction is estimated at 55-60%. Cannot exclude basal   inferior   hypokinesis. No pericardial effusion is present.    Echo: 2/13/2014 ([facility]: [place], [place])  By 2D echo, LV function appears to be normal, consistent with estimated EF of 60-65%. There is mild hypokinesis of the apical septum. No other specific wall motion abnormalities are seen. There is no pericardial effusion noted on this study.     Impression:  1. Normal LV function, estimated EF 60-65%.  2. Specific wall motion abnormality as described above.  3. RA enlargement.  4. Mild RV enlargement with preserved RV function.  5. Aortic sclerosis without stenosis.  6. Mild mitral regurgitation, tricuspid regurgitation, and pulmonic insufficiency.            CCL: 11/12/2014  Summary of Findings:  Dominance: right.  LM: 20% ostial lesion, 90% mid-LM stenosis.   LAD: 100% ostial LAD occlusion.   SVG to mid LAD: patent.  LCx: 90% ostial stenosis. Gives OM1 branch before continuing and terminating in the left AV groove.  RCA: Dominant, 80% proximal stenosis and 90% mid RCA stenosis. RCA gives a moderate-sized RV marginal and continues to the RPAV. It branches into the RPDA and 3 small-sized RPLAs, all of which have mild diffuse disease ~25%.    Impression:  Three vessel coronary artery disease.  Patent SVG-LAD .  S/p successful PCI ANKUR x2 of ostial to mid- RCA.  S/p successful PCI DESx1 of ostial LCx and mid LM.  S/p successful PCI DESx1 of ostial LM.    Recommendations:   Ticagrelor 90 mg bid along with aspirin 81 mg daily starting tomorrow 11/13/2014 for 1 year. Then aspirin  "81 mg qday alone lifelong.  Bedrest for 4 hours after sheath removal.  Plan to d/c home after bedrest complete and no groin hematoma observed overnight.   Follow up with the referring physician in 1-2 months.      Assessment and Plan:     \"Eloy\" is doing well after his  PCI and stenting on 11/12/14. He had a coronary angiogram after a recent episode of chest pain. This was unchanged since 2014. From a cardiac standpoint he is stable. Continue with current medications and review in two years.      CC  SELF, REFERRED      "

## 2017-04-06 NOTE — MR AVS SNAPSHOT
After Visit Summary   4/6/2017    Jacinto Flannery    MRN: [id number]           Patient Information     Date Of Birth          1940        Visit Information        Provider Department      4/6/2017 2:30 PM Jen Olivera MD [facility]        Care Instructions    Thank you for your visit today.  Please call me with any questions or concerns.   Tomasz Urias RN  Cardiology Care Coordinator  [phone], press option 1 then option 3        Follow-ups after your visit        Follow-up notes from your care team     Return for CAD, Dr. Olivera.      Your next 10 appointments already scheduled     Apr 07, 2017  9:45 AM CDT   (Arrive by 9:30 AM)   Return Visit with Natalie Porter MD   [facility] for Prostate and Urologic Cancers ([facility])    [address] [place] [id number] [phone]            Apr 07, 2017 10:50 AM CDT   (Arrive by 10:35 AM)   PRE-OP with Nadine Orozco MD   [facility] ([facility])    [address] [place] [id number] [phone]            Jul 05, 2017 10:00 AM CDT   (Arrive by 9:45 AM)   Return Visit with Anjelica Quarles MD   [facility] ([facility])    [address] [place] [id number] [phone]              Who to contact     If you have questions or need follow up information about today's clinic visit or your schedule please contact [facility] directly at [phone].  Normal or non-critical lab and imaging results will be communicated to you by MyChart, letter or phone within 4 business days after the clinic has received the results. If you do not hear from us within 7 days, please contact the clinic through MyChart or phone. If you have a critical or abnormal lab result, we will notify you by phone as soon as  "possible.  Submit refill requests through People and Pages or call your pharmacy and they will forward the refill request to us. Please allow 3 business days for your refill to be completed.          Additional Information About Your Visit        Foneshowhart Information     People and Pages gives you secure access to your electronic health record. If you see a primary care provider, you can also send messages to your care team and make appointments. If you have questions, please call your primary care clinic.  If you do not have a primary care provider, please call [phone] and they will assist you.        Care EveryWhere ID     This is your Care EveryWhere ID. This could be used by other organizations to access your [facility] medical records  [id number]        Your Vitals Were     Pulse Height Pulse Oximetry BMI (Body Mass Index)          70 1.778 m (5' 10\") 99% 25.07 kg/m2         Blood Pressure from Last 3 Encounters:   04/06/17 110/63   01/11/17 121/72   11/02/16 118/71    Weight from Last 3 Encounters:   04/06/17 79.2 kg (174 lb 11.2 oz)   01/11/17 79.4 kg (175 lb)   06/08/16 77.1 kg (170 lb)              Today, you had the following     No orders found for display       Primary Care Provider Office Phone # Fax #    Nadine Isabel Orozco [phone]       [address]  [facility] [address]        Thank you!     Thank you for choosing [facility]  for your care. Our goal is always to provide you with excellent care. Hearing back from our patients is one way we can continue to improve our services. Please take a few minutes to complete the written survey that you may receive in the mail after your visit with us. Thank you!             Your Updated Medication List - Protect others around you: Learn how to safely use, store and throw away your medicines at www.disposemymeds.org.          This list is accurate as of: 4/6/17  2:56 PM.  Always use your most recent med list. "                   Brand Name Dispense Instructions for use    alfuzosin 10 MG 24 hr tablet    UROXATRAL    90 tablet    TAKE ONE TABLET BY MOUTH ONCE DAILY       aspirin 81 MG tablet      Take 81 mg by mouth daily       B-12 1000 MCG Caps      Take 1 capsule by mouth daily       carBAMazepine 200 MG tablet    TEGretol    60 tablet    Take 1 tablet (200 mg) by mouth 2 times daily       eszopiclone 2 MG tablet    LUNESTA    15 tablet    Take 1 tablet (2 mg) by mouth nightly as needed for sleep       fluorouracil 5 % cream    EFUDEX    40 g    Apply to to face, nose, upper lip either once a day for 4 weeks or 2-3x a week for 6 months. Wash hands afterwards.       metoprolol 25 MG 24 hr tablet    TOPROL-XL    45 tablet    TAKE ONE-HALF TABLET BY MOUTH ONCE DAILY       nitroglycerin 0.4 MG sublingual tablet    NITROSTAT    25 tablet    Place 1 tablet (0.4 mg) under the tongue every 5 minutes as needed for chest pain       order for DME     1 Package    Equipment being ordered: Orthotics for both feet, need replacement       pantoprazole 40 MG EC tablet    PROTONIX    90 tablet    Take 1 tablet (40 mg) by mouth every morning       simvastatin 40 MG tablet    ZOCOR    90 tablet    Take 1 tablet (40 mg) by mouth At Bedtime       valACYclovir 1000 mg tablet    VALTREX    6 tablet    Take 1 pill twice a day for 3 days at the first sign of a cold core.

## 2017-04-07 ENCOUNTER — OFFICE VISIT (OUTPATIENT)
Dept: INTERNAL MEDICINE | Facility: CLINIC | Age: 77
End: 2017-04-07

## 2017-04-07 ENCOUNTER — OFFICE VISIT (OUTPATIENT)
Dept: UROLOGY | Facility: CLINIC | Age: 77
End: 2017-04-07

## 2017-04-07 VITALS
HEART RATE: 70 BPM | BODY MASS INDEX: 25.34 KG/M2 | SYSTOLIC BLOOD PRESSURE: 123 MMHG | DIASTOLIC BLOOD PRESSURE: 70 MMHG | WEIGHT: 177 LBS | HEIGHT: 70 IN | RESPIRATION RATE: 16 BRPM

## 2017-04-07 VITALS
WEIGHT: 177 LBS | DIASTOLIC BLOOD PRESSURE: 70 MMHG | HEIGHT: 70 IN | HEART RATE: 70 BPM | SYSTOLIC BLOOD PRESSURE: 123 MMHG | BODY MASS INDEX: 25.34 KG/M2

## 2017-04-07 DIAGNOSIS — N40.1 BENIGN NON-NODULAR PROSTATIC HYPERPLASIA WITH LOWER URINARY TRACT SYMPTOMS: Primary | ICD-10-CM

## 2017-04-07 DIAGNOSIS — Z01.818 PRE-OPERATIVE GENERAL PHYSICAL EXAMINATION: Primary | ICD-10-CM

## 2017-04-07 DIAGNOSIS — R33.9 URINARY RETENTION: ICD-10-CM

## 2017-04-07 ASSESSMENT — PAIN SCALES - GENERAL
PAINLEVEL: NO PAIN (0)
PAINLEVEL: NO PAIN (0)

## 2017-04-07 NOTE — MR AVS SNAPSHOT
After Visit Summary   4/7/2017    Jacinto Flannery    MRN: [id number]           Patient Information     Date Of Birth          1940        Visit Information        Provider Department      4/7/2017 9:45 AM Natalie Porter MD [facility] and [facility] for Prostate and Urologic Cancers        Today's Diagnoses     Benign non-nodular prostatic hyperplasia with lower urinary tract symptoms    -  1    Urinary retention          Care Instructions    Follow up with a Cystoscopy.    It was a pleasure meeting with you today.  Thank you for allowing me and my team the privilege of caring for you today.  YOU are the reason we are here, and I truly hope we provided you with the excellent service you deserve.  Please let us know if there is anything else we can do for you so that we can be sure you are leaving completely satisfied with your care experience.                Follow-ups after your visit        Your next 10 appointments already scheduled     Jul 05, 2017 10:00 AM CDT   (Arrive by 9:45 AM)   Return Visit with Anjelica Quarles MD   [facility] ([facility])    [address] [place] [id number] [phone]              Who to contact     Please call your clinic at [phone] to:    Ask questions about your health    Make or cancel appointments    Discuss your medicines    Learn about your test results    Speak to your doctor   If you have compliments or concerns about an experience at your clinic, or if you wish to file a complaint, please contact [facility] Patient Relations at [phone] or email us at [email or web address]         Additional Information About Your Visit        MyChart Information     Adyouliket gives you secure access to your electronic health record. If you see a primary care provider, you can also send messages to your care team and make appointments. If you have  "questions, please call your primary care clinic.  If you do not have a primary care provider, please call [phone] and they will assist you.      m-spatial is an electronic gateway that provides easy, online access to your medical records. With m-spatial, you can request a clinic appointment, read your test results, renew a prescription or communicate with your care team.     To access your existing account, please contact your [facility] or call [phone] for assistance.        Care EveryWhere ID     This is your Care EveryWhere ID. This could be used by other organizations to access your [place] medical records  [id number]        Your Vitals Were     Pulse Height BMI (Body Mass Index)             70 1.778 m (5' 10\") 25.4 kg/m2          Blood Pressure from Last 3 Encounters:   04/07/17 123/70   04/07/17 123/70   04/06/17 110/63    Weight from Last 3 Encounters:   04/07/17 80.3 kg (177 lb)   04/07/17 80.3 kg (177 lb)   04/06/17 79.2 kg (174 lb 11.2 oz)              We Performed the Following     COMPLEX UROFLOWMETRY     POST-VOID RESIDUAL BLADDER SCAN        Primary Care Provider Office Phone # Fax #    Nadine Orozco [phone]       [address] [place] [address]        Thank you!     Thank you for choosing [facility] AND [facility] FOR PROSTATE AND UROLOGIC CANCERS  for your care. Our goal is always to provide you with excellent care. Hearing back from our patients is one way we can continue to improve our services. Please take a few minutes to complete the written survey that you may receive in the mail after your visit with us. Thank you!             Your Updated Medication List - Protect others around you: Learn how to safely use, store and throw away your medicines at www.disposemymeds.org.          This list is accurate as of: 4/7/17  7:41 PM.  Always use your most recent med list.                   Brand " Name Dispense Instructions for use    alfuzosin 10 MG 24 hr tablet    UROXATRAL    90 tablet    TAKE ONE TABLET BY MOUTH ONCE DAILY       aspirin 81 MG tablet      Take 81 mg by mouth daily       B-12 1000 MCG Caps      Take 1 capsule by mouth daily       carBAMazepine 200 MG tablet    TEGretol    60 tablet    Take 1 tablet (200 mg) by mouth 2 times daily       eszopiclone 2 MG tablet    LUNESTA    15 tablet    Take 1 tablet (2 mg) by mouth nightly as needed for sleep       fluorouracil 5 % cream    EFUDEX    40 g    Apply to to face, nose, upper lip either once a day for 4 weeks or 2-3x a week for 6 months. Wash hands afterwards.       metoprolol 25 MG 24 hr tablet    TOPROL-XL    45 tablet    TAKE ONE-HALF TABLET BY MOUTH ONCE DAILY       nitroglycerin 0.4 MG sublingual tablet    NITROSTAT    25 tablet    Place 1 tablet (0.4 mg) under the tongue every 5 minutes as needed for chest pain       order for DME     1 Package    Equipment being ordered: Orthotics for both feet, need replacement       pantoprazole 40 MG EC tablet    PROTONIX    90 tablet    Take 1 tablet (40 mg) by mouth every morning       simvastatin 40 MG tablet    ZOCOR    90 tablet    Take 1 tablet (40 mg) by mouth At Bedtime       valACYclovir 1000 mg tablet    VALTREX    6 tablet    Take 1 pill twice a day for 3 days at the first sign of a cold core.

## 2017-04-07 NOTE — PROGRESS NOTES
Surgeon (please enter first and last name): Dr. lAston  Location of Surgery: [facility]  Date of Surgery: 04/19/2017  Procedure: Cataract  History of reaction to anesthesia? Cheyenne      Jacinto is a 76 year old male with  has a past medical history of BPH (benign prostatic hyperplasia); CAD (coronary artery disease); GERD (gastroesophageal reflux disease); HSV-2 (herpes simplex virus 2) infection (09/2014); Hyperlipidaemia LDL goal < 70; Hypertension; Insomnia; NSTEMI (non-ST elevated myocardial infarction) (H) (11/15/2014); S/P coronary artery stent placement (11/12/14); Skin disease; and Squamous cell carcinoma (H).     He is here for preop evaluation prior to cataract surgery.  He feels well today, absolutely no concerns.  Good exercise tolerance, no chest pain.  He has progressive blurry vision in that eye that is worse at night and wishes to have it addressed.    Past, family, social history unchanged per [facility].   ROS: 10 point ROS neg other than the symptoms noted above in the HPI.    PHYSICAL EXAM:  B/P: 123/70, P: 70, R: 16  Constitutional: no distress, comfortable, pleasant   Eyes: anicteric, normal extra-ocular movements   Ears, Nose and Throat: tympanic membranes clear, nose clear and free of lesions, throat clear, neck supple with full range of motion, no thyromegaly.   Cardiovascular: regular rate and rhythm, normal S1 and S2, no murmurs, rubs or gallops, peripheral pulses full and symmetric   Respiratory: clear to auscultation, no wheezes or crackles, normal breath sounds   Gastrointestinal: positive bowel sounds, nontender, no hepatosplenomegaly, no masses   Musculoskeletal: full range of motion, no active joints   Skin: no concerning lesions, no jaundice   Neurological: normal gait, no tremor   Psychological: appropriate mood   Lymphatic: no cervical lymphadenopathy and no pedal edema    A/P:  Low risk for perioperative events; proceed with surgery as planned.  No indication for any further  testing at this time.  Continue all current medications.    Ernesto Aponte MD

## 2017-04-07 NOTE — LETTER
4/7/2017      RE: Jacinto Flannery  [address]  [place] [address]       Surgeon (please enter first and last name): Dr. Alston  Location of Surgery: [facility]  Date of Surgery: 04/19/2017  Procedure: Cataract  History of reaction to anesthesia? Cheyenne Casey is a 76 year old male with  has a past medical history of BPH (benign prostatic hyperplasia); CAD (coronary artery disease); GERD (gastroesophageal reflux disease); HSV-2 (herpes simplex virus 2) infection (09/2014); Hyperlipidaemia LDL goal < 70; Hypertension; Insomnia; NSTEMI (non-ST elevated myocardial infarction) (H) (11/15/2014); S/P coronary artery stent placement (11/12/14); Skin disease; and Squamous cell carcinoma (H).     He is here for preop evaluation prior to cataract surgery.  He feels well today, absolutely no concerns.  Good exercise tolerance, no chest pain.  He has progressive blurry vision in that eye that is worse at night and wishes to have it addressed.    Past, family, social history unchanged per [facility].   ROS: 10 point ROS neg other than the symptoms noted above in the HPI.    PHYSICAL EXAM:  B/P: 123/70, P: 70, R: 16  Constitutional: no distress, comfortable, pleasant   Eyes: anicteric, normal extra-ocular movements   Ears, Nose and Throat: tympanic membranes clear, nose clear and free of lesions, throat clear, neck supple with full range of motion, no thyromegaly.   Cardiovascular: regular rate and rhythm, normal S1 and S2, no murmurs, rubs or gallops, peripheral pulses full and symmetric   Respiratory: clear to auscultation, no wheezes or crackles, normal breath sounds   Gastrointestinal: positive bowel sounds, nontender, no hepatosplenomegaly, no masses   Musculoskeletal: full range of motion, no active joints   Skin: no concerning lesions, no jaundice   Neurological: normal gait, no tremor   Psychological: appropriate mood   Lymphatic: no cervical lymphadenopathy and no pedal edema    A/P:  Low risk for perioperative  events; proceed with surgery as planned.  No indication for any further testing at this time.  Continue all current medications.    Ernesto Aponte MD

## 2017-04-07 NOTE — LETTER
4/7/2017       RE: Jacinto Flannery  [address]  [place] [address]     Dear Colleague,    Thank you for referring your patient, Jacinto Flannery, to the [facility] UROLOGY AND INST FOR PROSTATE AND UROLOGIC CANCERS at [facility]. Please see a copy of my visit note below.    Office Visit Note      UROLOGIC DIAGNOSES:       CURRENT INTERVENTIONS:       HISTORY:   Patient with history of AUR s/p PVP complicated by hematuria.  Presents at one year after procedure.     Patient states that compared to five years ago, he is doing great.   Wakes once in the evening. Notes some post void dribbling.   Notes that he does not empty completely, but that this is standard for him.   No further retention or hesitancy.     Patient's PVR was 240ml however.       We discussed symptoms, PVR, possible cystoscopy to evaluate for BNC.             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     Hyperlipidaemia LDL goal < 70      Hypertension      Insomnia      NSTEMI (non-ST elevated myocardial infarction) (H) 11/15/2014    Type 4a     S/P coronary artery stent placement 11/12/14    x 4 ANKUR's     Skin disease      Squamous cell carcinoma (H)        PAST SURGICAL HISTORY:   Past Surgical History:   Procedure Laterality Date     C CABG, VEIN, THREE  1990    SVG-LAD in [place]     CHOLECYSTECTOMY  1992    in [place]     GENITOURINARY SURGERY       HEART CATH STENT COR W/WO PTCA  11/12/2014    x 4 ANKUR's (two ostial to mid-RCA, one ostial LCx and mid-LM, one ostial LAD)     HERNIA REPAIR Right 2010    in [place]     LASER KTP GREEN LIGHT PHOTOSELECTIVE VAPORIZATION PROSTATE N/A 1/5/2015    Procedure: LASER KTP GREEN LIGHT PHOTOSELECTIVE VAPORIZATION PROSTATE;  Surgeon:  "Natalie Porter MD;  Location: [facility] OR     MOHS MICROGRAPHIC PROCEDURE         FAMILY HISTORY:   Family History   Problem Relation Age of Onset     CANCER Father      Stomach     HEART DISEASE Mother      HEART DISEASE Maternal Grandmother      Skin Cancer No family hx of        SOCIAL HISTORY:   Social History   Substance Use Topics     Smoking status: Former Smoker     Smokeless tobacco: Never Used     Alcohol use Yes      Comment: rarely       Current Outpatient Prescriptions   Medication     metoprolol (TOPROL-XL) 25 MG 24 hr tablet     pantoprazole (PROTONIX) 40 MG EC tablet     simvastatin (ZOCOR) 40 MG tablet     fluorouracil (EFUDEX) 5 % cream     valACYclovir (VALTREX) 1000 mg tablet     alfuzosin (UROXATRAL) 10 MG 24 hr tablet     eszopiclone (LUNESTA) 2 MG tablet     carBAMazepine (TEGRETOL) 200 MG tablet     order for DME     nitroglycerin (NITROSTAT) 0.4 MG SL tablet     aspirin 81 MG tablet     Cyanocobalamin (B-12) 1000 MCG CAPS     No current facility-administered medications for this visit.          PHYSICAL EXAM:    /70  Pulse 70  Ht 1.778 m (5' 10\")  Wt 80.3 kg (177 lb)  BMI 25.4 kg/m2    HEENT: Normocephalic and atraumatic   Cardiac: Not done  Back/Flank: Not done  CNS/PNS: Not done  Respiratory: Normal non-labored breathing  Abdomen: Soft nontender and nondistended  Peripheral Vascular: Not done  Mental Status: Not done    Penis: Not done  Scrotal Skin: Not done  Testicles: Not done  Epididymis: Not done  Digital Rectal Exam:     Cystoscopy: Not done    Imaging: None    Urinalysis: UA RESULTS:  Recent Labs   Lab Test  01/16/15   0053  11/26/14   COLOR  Dark Red   < >  Red   APPEARANCE  Slightly Cloudy   < >  Clear   URINEGLC  Negative   < >  Neg   URINEBILI  Negative   < >  Small   URINEKETONE  5*   < >  Neg   SG  1.005   < >  1.010   UBLD  Large*   < >  Large   URINEPH  6.5   < >  5.0   PROTEIN  100*   < >  Trace   UROBILINOGEN   --    --   0.2   NITRITE  Negative   < >  Pos "   LEUKEST  Moderate*   < >  Small   RBCU  47*   < >   --    WBCU  14*   < >   --     < > = values in this interval not displayed.       PSA:     Post Void Residual: 240ml     Other labs: None today      IMPRESSION:  75 y/o M with history of AUR s/p PVP     PLAN:  Schedule for cystoscopy next available to evaluate for BNC     Total Time: 15 minutes                                       Total in Consultation: greater than 50%     Natalie Porter MD

## 2017-04-07 NOTE — PATIENT INSTRUCTIONS
Chief Complaint   Patient presents with     Pre-Op Exam     Cataract 04/19/2017 @ [facility] Dr. Gamaliel ALARCON at 11:05 AM on 4/7/2017.

## 2017-04-07 NOTE — NURSING NOTE
"Chief Complaint   Patient presents with     RECHECK     Green light follow up - 1 year       Blood pressure 123/70, pulse 70, height 1.778 m (5' 10\"), weight 80.3 kg (177 lb). Body mass index is 25.4 kg/(m^2).    Patient Active Problem List   Diagnosis     CAD S/P percutaneous coronary angioplasty     CAD (coronary artery disease)     BPH (benign prostatic hyperplasia)     Hyperlipidemia with target LDL less than 70     S/P coronary artery stent placement     GERD (gastroesophageal reflux disease)     S/P TURP     Dermatitis     AK (actinic keratosis)     SCC (squamous cell carcinoma)     SK (seborrheic keratosis)     EIC (epidermal inclusion cyst)     History of squamous cell carcinoma     Seborrheic keratosis     Inflamed seborrheic keratosis     History of dysplastic nevus     Varicose veins     Medication reaction, initial encounter       Allergies   Allergen Reactions     Macrodantin [Nitrofurantoin] Rash     Sulfa Drugs Rash     Tamsulosin Rash       Current Outpatient Prescriptions   Medication Sig Dispense Refill     metoprolol (TOPROL-XL) 25 MG 24 hr tablet TAKE ONE-HALF TABLET BY MOUTH ONCE DAILY 45 tablet 3     pantoprazole (PROTONIX) 40 MG EC tablet Take 1 tablet (40 mg) by mouth every morning 90 tablet 3     simvastatin (ZOCOR) 40 MG tablet Take 1 tablet (40 mg) by mouth At Bedtime 90 tablet 3     fluorouracil (EFUDEX) 5 % cream Apply to to face, nose, upper lip either once a day for 4 weeks or 2-3x a week for 6 months. Wash hands afterwards. 40 g 0     valACYclovir (VALTREX) 1000 mg tablet Take 1 pill twice a day for 3 days at the first sign of a cold core. 6 tablet 3     alfuzosin (UROXATRAL) 10 MG 24 hr tablet TAKE ONE TABLET BY MOUTH ONCE DAILY 90 tablet 3     eszopiclone (LUNESTA) 2 MG tablet Take 1 tablet (2 mg) by mouth nightly as needed for sleep 15 tablet 2     carBAMazepine (TEGRETOL) 200 MG tablet Take 1 tablet (200 mg) by mouth 2 times daily 60 tablet 3     order for DME Equipment being " ordered: Orthotics for both feet, need replacement 1 Package 1     nitroglycerin (NITROSTAT) 0.4 MG SL tablet Place 1 tablet (0.4 mg) under the tongue every 5 minutes as needed for chest pain 25 tablet 3     aspirin 81 MG tablet Take 81 mg by mouth daily       Cyanocobalamin (B-12) 1000 MCG CAPS Take 1 capsule by mouth daily          Social History   Substance Use Topics     Smoking status: Former Smoker     Smokeless tobacco: Never Used     Alcohol use Yes      Comment: rarely       KAYA Ortiz  4/7/2017  10:01 AM

## 2017-04-07 NOTE — PROGRESS NOTES
Office Visit Note      UROLOGIC DIAGNOSES:       CURRENT INTERVENTIONS:       HISTORY:   Patient with history of AUR s/p PVP complicated by hematuria.  Presents at one year after procedure.     Patient states that compared to five years ago, he is doing great.   Wakes once in the evening. Notes some post void dribbling.   Notes that he does not empty completely, but that this is standard for him.   No further retention or hesitancy.     Patient's PVR was 240ml however.       We discussed symptoms, PVR, possible cystoscopy to evaluate for BNC.             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     Hyperlipidaemia LDL goal < 70      Hypertension      Insomnia      NSTEMI (non-ST elevated myocardial infarction) (H) 11/15/2014    Type 4a     S/P coronary artery stent placement 11/12/14    x 4 ANKUR's     Skin disease      Squamous cell carcinoma (H)        PAST SURGICAL HISTORY:   Past Surgical History:   Procedure Laterality Date     C CABG, VEIN, THREE  1990    SVG-LAD in [place]     CHOLECYSTECTOMY  1992    in [place]     GENITOURINARY SURGERY       HEART CATH STENT COR W/WO PTCA  11/12/2014    x 4 ANKUR's (two ostial to mid-RCA, one ostial LCx and mid-LM, one ostial LAD)     HERNIA REPAIR Right 2010    in [place]     LASER KTP GREEN LIGHT PHOTOSELECTIVE VAPORIZATION PROSTATE N/A 1/5/2015    Procedure: LASER KTP GREEN LIGHT PHOTOSELECTIVE VAPORIZATION PROSTATE;  Surgeon: Natalie Porter MD;  Location:  OR     MOHS MICROGRAPHIC PROCEDURE         FAMILY HISTORY:   Family History   Problem Relation Age of Onset     CANCER Father      Stomach     HEART DISEASE Mother      HEART DISEASE Maternal Grandmother      Skin Cancer No family hx of        SOCIAL HISTORY:  "  Social History   Substance Use Topics     Smoking status: Former Smoker     Smokeless tobacco: Never Used     Alcohol use Yes      Comment: rarely       Current Outpatient Prescriptions   Medication     metoprolol (TOPROL-XL) 25 MG 24 hr tablet     pantoprazole (PROTONIX) 40 MG EC tablet     simvastatin (ZOCOR) 40 MG tablet     fluorouracil (EFUDEX) 5 % cream     valACYclovir (VALTREX) 1000 mg tablet     alfuzosin (UROXATRAL) 10 MG 24 hr tablet     eszopiclone (LUNESTA) 2 MG tablet     carBAMazepine (TEGRETOL) 200 MG tablet     order for DME     nitroglycerin (NITROSTAT) 0.4 MG SL tablet     aspirin 81 MG tablet     Cyanocobalamin (B-12) 1000 MCG CAPS     No current facility-administered medications for this visit.          PHYSICAL EXAM:    /70  Pulse 70  Ht 1.778 m (5' 10\")  Wt 80.3 kg (177 lb)  BMI 25.4 kg/m2    HEENT: Normocephalic and atraumatic   Cardiac: Not done  Back/Flank: Not done  CNS/PNS: Not done  Respiratory: Normal non-labored breathing  Abdomen: Soft nontender and nondistended  Peripheral Vascular: Not done  Mental Status: Not done    Penis: Not done  Scrotal Skin: Not done  Testicles: Not done  Epididymis: Not done  Digital Rectal Exam:     Cystoscopy: Not done    Imaging: None    Urinalysis: UA RESULTS:  Recent Labs   Lab Test  01/16/15   0053  11/26/14   COLOR  Dark Red   < >  Red   APPEARANCE  Slightly Cloudy   < >  Clear   URINEGLC  Negative   < >  Neg   URINEBILI  Negative   < >  Small   URINEKETONE  5*   < >  Neg   SG  1.005   < >  1.010   UBLD  Large*   < >  Large   URINEPH  6.5   < >  5.0   PROTEIN  100*   < >  Trace   UROBILINOGEN   --    --   0.2   NITRITE  Negative   < >  Pos   LEUKEST  Moderate*   < >  Small   RBCU  47*   < >   --    WBCU  14*   < >   --     < > = values in this interval not displayed.       PSA:     Post Void Residual: 240ml     Other labs: None today      IMPRESSION:  77 y/o M with history of AUR s/p PVP     PLAN:  Schedule for cystoscopy next available to " evaluate for BNC     Total Time: 15 minutes                                       Total in Consultation: greater than 50%

## 2017-04-07 NOTE — NURSING NOTE
Chief Complaint   Patient presents with     Pre-Op Exam     Cataract 04/19/2017 @ [facility] Gamaliel ALARCON at 11:05 AM on 4/7/2017.

## 2017-04-07 NOTE — MR AVS SNAPSHOT
After Visit Summary   4/7/2017    Jacinto Flannery    MRN: [id number]           Patient Information     Date Of Birth          1940        Visit Information        Provider Department      4/7/2017 10:50 AM Nadine Alonso MD [facility]        Care Instructions    Chief Complaint   Patient presents with     Pre-Op Exam     Cataract 04/19/2017 @ [facility] Gamaliel ALARCON at 11:05 AM on 4/7/2017.          Follow-ups after your visit        Your next 10 appointments already scheduled     Jul 05, 2017 10:00 AM CDT   (Arrive by 9:45 AM)   Return Visit with Anjelica Quarles MD   [facility] ([facility])    [address]  [address] [facility] [address] [phone]              Who to contact     Please call your clinic at [phone] to:    Ask questions about your health    Make or cancel appointments    Discuss your medicines    Learn about your test results    Speak to your doctor   If you have compliments or concerns about an experience at your clinic, or if you wish to file a complaint, please contact [facility] Patient Relations at [phone] or email us at [email or web address]         Additional Information About Your Visit        MyChart Information     [employer] gives you secure access to your electronic health record. If you see a primary care provider, you can also send messages to your care team and make appointments. If you have questions, please call your primary care clinic.  If you do not have a primary care provider, please call [phone] and they will assist you.      [employer] is an electronic gateway that provides easy, online access to your medical records. With [employer], you can request a clinic appointment, read your test results, renew a prescription or communicate with your care team.     To access your existing account, please contact your  "[facility] or call [phone] for assistance.        Care EveryWhere ID     This is your Care EveryWhere ID. This could be used by other organizations to access your [place] medical records  [id number]        Your Vitals Were     Pulse Respirations Height BMI (Body Mass Index)          70 16 1.778 m (5' 10\") 25.4 kg/m2         Blood Pressure from Last 3 Encounters:   04/07/17 123/70   04/07/17 123/70   04/06/17 110/63    Weight from Last 3 Encounters:   04/07/17 80.3 kg (177 lb)   04/07/17 80.3 kg (177 lb)   04/06/17 79.2 kg (174 lb 11.2 oz)              Today, you had the following     No orders found for display       Primary Care Provider Office Phone # Fax #    Nadine Isabel Orozco [phone]       [address]  [facility] [address]        Thank you!     Thank you for choosing [facility]  for your care. Our goal is always to provide you with excellent care. Hearing back from our patients is one way we can continue to improve our services. Please take a few minutes to complete the written survey that you may receive in the mail after your visit with us. Thank you!             Your Updated Medication List - Protect others around you: Learn how to safely use, store and throw away your medicines at www.disposemymeds.org.          This list is accurate as of: 4/7/17 11:32 AM.  Always use your most recent med list.                   Brand Name Dispense Instructions for use    alfuzosin 10 MG 24 hr tablet    UROXATRAL    90 tablet    TAKE ONE TABLET BY MOUTH ONCE DAILY       aspirin 81 MG tablet      Take 81 mg by mouth daily       B-12 1000 MCG Caps      Take 1 capsule by mouth daily       carBAMazepine 200 MG tablet    TEGretol    60 tablet    Take 1 tablet (200 mg) by mouth 2 times daily       eszopiclone 2 MG tablet    LUNESTA    15 tablet    Take 1 tablet (2 mg) by mouth nightly as needed for sleep       " fluorouracil 5 % cream    EFUDEX    40 g    Apply to to face, nose, upper lip either once a day for 4 weeks or 2-3x a week for 6 months. Wash hands afterwards.       metoprolol 25 MG 24 hr tablet    TOPROL-XL    45 tablet    TAKE ONE-HALF TABLET BY MOUTH ONCE DAILY       nitroglycerin 0.4 MG sublingual tablet    NITROSTAT    25 tablet    Place 1 tablet (0.4 mg) under the tongue every 5 minutes as needed for chest pain       order for DME     1 Package    Equipment being ordered: Orthotics for both feet, need replacement       pantoprazole 40 MG EC tablet    PROTONIX    90 tablet    Take 1 tablet (40 mg) by mouth every morning       simvastatin 40 MG tablet    ZOCOR    90 tablet    Take 1 tablet (40 mg) by mouth At Bedtime       valACYclovir 1000 mg tablet    VALTREX    6 tablet    Take 1 pill twice a day for 3 days at the first sign of a cold core.

## 2017-04-07 NOTE — LETTER
4/7/2017       RE: Jacinto Flannery  [address]  [facility] [address]     Dear Colleague,    Thank you for referring your patient, Jacinto Flannery, to the [facility]IMARY [facility] at [facility]. Please see a copy of my visit note below.    Surgeon (please enter first and last name): Dr. Alston  Location of Surgery: [facility]  Date of Surgery: 04/19/2017  Procedure: Cataract  History of reaction to anesthesia? No      Jacinto is a 76 year old male with  has a past medical history of BPH (benign prostatic hyperplasia); CAD (coronary artery disease); GERD (gastroesophageal reflux disease); HSV-2 (herpes simplex virus 2) infection (09/2014); Hyperlipidaemia LDL goal < 70; Hypertension; Insomnia; NSTEMI (non-ST elevated myocardial infarction) (H) (11/15/2014); S/P coronary artery stent placement (11/12/14); Skin disease; and Squamous cell carcinoma (H).     He is here for preop evaluation prior to cataract surgery.  He feels well today, absolutely no concerns.  Good exercise tolerance, no chest pain.  He has progressive blurry vision in that eye that is worse at night and wishes to have it addressed.    Past, family, social history unchanged per [facility].   ROS: 10 point ROS neg other than the symptoms noted above in the HPI.    PHYSICAL EXAM:  B/P: 123/70, P: 70, R: 16  Constitutional: no distress, comfortable, pleasant   Eyes: anicteric, normal extra-ocular movements   Ears, Nose and Throat: tympanic membranes clear, nose clear and free of lesions, throat clear, neck supple with full range of motion, no thyromegaly.   Cardiovascular: regular rate and rhythm, normal S1 and S2, no murmurs, rubs or gallops, peripheral pulses full and symmetric   Respiratory: clear to auscultation, no wheezes or crackles, normal breath sounds   Gastrointestinal: positive bowel sounds, nontender, no hepatosplenomegaly, no masses   Musculoskeletal: full range of motion, no active joints   Skin:  no concerning lesions, no jaundice   Neurological: normal gait, no tremor   Psychological: appropriate mood   Lymphatic: no cervical lymphadenopathy and no pedal edema    A/P:  Low risk for perioperative events; proceed with surgery as planned.  No indication for any further testing at this time.  Continue all current medications.    Ernesto Aponte MD

## 2017-04-07 NOTE — PATIENT INSTRUCTIONS
Follow up with a Cystoscopy.    It was a pleasure meeting with you today.  Thank you for allowing me and my team the privilege of caring for you today.  YOU are the reason we are here, and I truly hope we provided you with the excellent service you deserve.  Please let us know if there is anything else we can do for you so that we can be sure you are leaving completely satisfied with your care experience.

## 2017-04-21 DIAGNOSIS — G47.00 INSOMNIA: ICD-10-CM

## 2017-04-21 RX ORDER — ESZOPICLONE 2 MG/1
2 TABLET, FILM COATED ORAL
Qty: 15 TABLET | Refills: 2 | Status: SHIPPED | OUTPATIENT
Start: 2017-04-21 | End: 2017-11-14

## 2017-04-21 NOTE — TELEPHONE ENCOUNTER
Date last refill was ordered:9/16/16  Quantity ordered:15  Number of refills:2  Date of last clinic visit:4/7/17  Date of next clinic visit:None     Refill for Lunata faxed to Esau Dunaway RN 6:43 AM on 4/24/2017.

## 2017-04-21 NOTE — TELEPHONE ENCOUNTER
----- Message from Mu Nugent sent at 4/21/2017  9:18 AM CDT -----  Regarding: Refill   Contact: [phone]  He reports his pharmacy faxed an order for refill on Tuesday but havent recieved it back.    Patient states he is leaving [facility] on Monday and would like this to be filled before the weekend.    This is regarding eszopiclone (LUNESTA) 2 MG tablet    Please send to [facility] in [place]

## 2017-05-23 ENCOUNTER — PRE VISIT (OUTPATIENT)
Dept: UROLOGY | Facility: CLINIC | Age: 77
End: 2017-05-23

## 2017-06-09 ENCOUNTER — OFFICE VISIT (OUTPATIENT)
Dept: UROLOGY | Facility: CLINIC | Age: 77
End: 2017-06-09

## 2017-06-09 VITALS
HEART RATE: 77 BPM | WEIGHT: 172 LBS | HEIGHT: 71 IN | SYSTOLIC BLOOD PRESSURE: 115 MMHG | DIASTOLIC BLOOD PRESSURE: 64 MMHG | BODY MASS INDEX: 24.08 KG/M2

## 2017-06-09 DIAGNOSIS — R33.9 URINARY RETENTION: Primary | ICD-10-CM

## 2017-06-09 ASSESSMENT — PAIN SCALES - GENERAL
PAINLEVEL: NO PAIN (0)
PAINLEVEL: NO PAIN (0)

## 2017-06-09 NOTE — PROGRESS NOTES
PRE-PROCEDURE DIAGNOSIS: urinary retention   POST-PROCEDURE DIAGNOSIS: urinary retention   PROCEDURE: Cystoscopy  HISTORY: Jacinto Flannery is a 76 year old male with history of urinary retention s/p PVP complicated by hematuria. Has been doing well with few symptoms but elevated PVR from previous. Presents for cystoscopy to evaluate for BNC.   REVIEW OF OFFICE STUDIES (e.g., uroflow or PVR): as above   DESCRIPTION OF PROCEDURE: After informed consent was obtained, the patient was brought to the procedure room where he was placed in the supine position with all pressure points well padded.  The penis and scrotum were prepped and draped in a sterile fashion. A flexible cystoscope was introduced through a well-lubricated urethra. Anterior urethra strictures were absent.   The urinary sphincter was intact.  The prostate demonstrated TUR defect.  Bladder neck was open.   Bladder signififcant for presence of the following:      Diverticuli: absent      Cellules: absent      Trabeculation: present 2      Tumors: absent      Stones: absent  The flexible cystoscope was removed and the findings were described to the patient.   ASSESSMENT AND PLAN: 76 year old male with high PVR, history of PVP, no BNC   Follow up in six months for uroflow/PVR

## 2017-06-09 NOTE — PATIENT INSTRUCTIONS
Please follow up in 6 months with a flow/PVR  Please come with a full bladder to appointment.    It was a pleasure meeting with you today.  Thank you for allowing me and my team the privilege of caring for you today.  YOU are the reason we are here, and I truly hope we provided you with the excellent service you deserve.  Please let us know if there is anything else we can do for you so that we can be sure you are leaving completely satisfied with your care experience.

## 2017-06-09 NOTE — NURSING NOTE
Invasive Procedure Safety Checklist:    Procedure: cysto    Action: Complete sections and checkboxes as appropriate.    Pre-procedure:  1. Patient ID Verified with 2 identifiers (Liss and  or MRN) : YES    2. Procedure and site verified with patient/designee (when able) : YES    3. Accurate consent documentation in medical record : YES    4. H&P (or appropriate assessment) documented in medical record : NO  H&P must be up to 30 days prior to procedure an updated within 24 hours of                 Procedure as applicable.     5. Relevant diagnostic and radiology test results appropriately labeled and displayed as applicable : YES    6. Blood products, implants, devices, and/or special equipment available for the procedure as applicable : YES    7. Procedure site(s) marked with provider initials [Exclusions: none] : NO    8. Marking not required. Reason : Yes  Procedure does not require site marking    Time Out:     Time-Out performed immediately prior to starting procedure, including verbal and active participation of all team members addressing: YES    1. Correct patient identity.  2. Confirmed that the correct side and site are marked.  3. An accurate procedure to be done.  4. Agreement on the procedure to be done.  5. Correct patient position.  6. Relevant images and results are properly labeled and appropriately displayed.  7. The need to administer antibiotics or fluids for irrigation purposes during the procedure as applicable.  8. Safety precautions based on patient history or medication use.    During Procedure: Verification of correct person, site, and procedure occurs any time the responsibility for care of the patient is transferred to another member of the care team.

## 2017-06-09 NOTE — LETTER
6/9/2017     RE: Jacinto Flannery  [address]  [place] [address]     Dear Colleague,    Thank you for referring your patient, Jacinto Flannery, to the [facility] UROLOGY AND INST FOR PROSTATE AND UROLOGIC CANCERS at [facility]. Please see a copy of my visit note below.      PRE-PROCEDURE DIAGNOSIS: urinary retention   POST-PROCEDURE DIAGNOSIS: urinary retention   PROCEDURE: Cystoscopy  HISTORY: Jacinto Flannery is a 76 year old male with history of urinary retention s/p PVP complicated by hematuria. Has been doing well with few symptoms but elevated PVR from previous. Presents for cystoscopy to evaluate for BNC.   REVIEW OF OFFICE STUDIES (e.g., uroflow or PVR): as above   DESCRIPTION OF PROCEDURE: After informed consent was obtained, the patient was brought to the procedure room where he was placed in the supine position with all pressure points well padded.  The penis and scrotum were prepped and draped in a sterile fashion. A flexible cystoscope was introduced through a well-lubricated urethra. Anterior urethra strictures were absent.   The urinary sphincter was intact.  The prostate demonstrated TUR defect.  Bladder neck was open.   Bladder signififcant for presence of the following:      Diverticuli: absent      Cellules: absent      Trabeculation: present 2      Tumors: absent      Stones: absent  The flexible cystoscope was removed and the findings were described to the patient.   ASSESSMENT AND PLAN: 76 year old male with high PVR, history of PVP, no BNC   Follow up in six months for uroflow/PVR     Again, thank you for allowing me to participate in the care of your patient.      Sincerely,    Natalie Porter MD

## 2017-06-09 NOTE — MR AVS SNAPSHOT
After Visit Summary   6/9/2017    Jacinto Flannery    MRN: [id number]           Patient Information     Date Of Birth          1940        Visit Information        Provider Department      6/9/2017 3:30 PM Natalie Porter MD [facility] Urology and [facility] for Prostate and Urologic Cancers        Care Instructions    Please follow up in 6 months with a flow/PVR  Please come with a full bladder to appointment.    It was a pleasure meeting with you today.  Thank you for allowing me and my team the privilege of caring for you today.  YOU are the reason we are here, and I truly hope we provided you with the excellent service you deserve.  Please let us know if there is anything else we can do for you so that we can be sure you are leaving completely satisfied with your care experience.                Follow-ups after your visit        Your next 10 appointments already scheduled     Jul 05, 2017 10:00 AM CDT   (Arrive by 9:45 AM)   Return Visit with Anjelica Quarles MD   [facility] Dermatology ([facility])    [address] [place] [id number] [phone]            Dec 08, 2017  2:30 PM CST   (Arrive by 2:15 PM)   Return Visit with Natalie Porter MD   [facility] Urology and [facility] for Prostate and Urologic Cancers ([facility])    [address] [place] [address] [phone]              Who to contact     Please call your clinic at [phone] to:    Ask questions about your health    Make or cancel appointments    Discuss your medicines    Learn about your test results    Speak to your doctor   If you have compliments or concerns about an experience at your clinic, or if you wish to file a complaint, please contact [facility] Patient Relations at [phone] or email us at [email or web address]         Additional Information About Your Visit       "  MyChart Information     [employer] gives you secure access to your electronic health record. If you see a primary care provider, you can also send messages to your care team and make appointments. If you have questions, please call your primary care clinic.  If you do not have a primary care provider, please call [phone] and they will assist you.      [employer] is an electronic gateway that provides easy, online access to your medical records. With [employer], you can request a clinic appointment, read your test results, renew a prescription or communicate with your care team.     To access your existing account, please contact your [facility] or call [phone] for assistance.        Care EveryWhere ID     This is your Care EveryWhere ID. This could be used by other organizations to access your [place] medical records  [id number]        Your Vitals Were     Pulse Height BMI (Body Mass Index)             77 1.803 m (5' 11\") 23.99 kg/m2          Blood Pressure from Last 3 Encounters:   06/09/17 115/64   04/07/17 123/70   04/07/17 123/70    Weight from Last 3 Encounters:   06/09/17 78 kg (172 lb)   04/07/17 80.3 kg (177 lb)   04/07/17 80.3 kg (177 lb)              Today, you had the following     No orders found for display       Primary Care Provider Office Phone # Fax #    Nadine Isabel Orozco [phone]       [address]  [facility] [address]        Thank you!     Thank you for choosing [facility] UROLOGY AND [facility] FOR PROSTATE AND UROLOGIC CANCERS  for your care. Our goal is always to provide you with excellent care. Hearing back from our patients is one way we can continue to improve our services. Please take a few minutes to complete the written survey that you may receive in the mail after your visit with us. Thank you!             Your Updated Medication List - Protect others around you: Learn how to safely use, store and " throw away your medicines at www.disposemymeds.org.          This list is accurate as of: 6/9/17  4:03 PM.  Always use your most recent med list.                   Brand Name Dispense Instructions for use    alfuzosin 10 MG 24 hr tablet    UROXATRAL    90 tablet    TAKE ONE TABLET BY MOUTH ONCE DAILY       aspirin 81 MG tablet      Take 81 mg by mouth daily       B-12 1000 MCG Caps      Take 1 capsule by mouth daily       carBAMazepine 200 MG tablet    TEGretol    60 tablet    Take 1 tablet (200 mg) by mouth 2 times daily       eszopiclone 2 MG tablet    LUNESTA    15 tablet    Take 1 tablet (2 mg) by mouth nightly as needed for sleep       fluorouracil 5 % cream    EFUDEX    40 g    Apply to to face, nose, upper lip either once a day for 4 weeks or 2-3x a week for 6 months. Wash hands afterwards.       metoprolol 25 MG 24 hr tablet    TOPROL-XL    45 tablet    TAKE ONE-HALF TABLET BY MOUTH ONCE DAILY       nitroglycerin 0.4 MG sublingual tablet    NITROSTAT    25 tablet    Place 1 tablet (0.4 mg) under the tongue every 5 minutes as needed for chest pain       order for DME     1 Package    Equipment being ordered: Orthotics for both feet, need replacement       pantoprazole 40 MG EC tablet    PROTONIX    90 tablet    Take 1 tablet (40 mg) by mouth every morning       simvastatin 40 MG tablet    ZOCOR    90 tablet    Take 1 tablet (40 mg) by mouth At Bedtime       valACYclovir 1000 mg tablet    VALTREX    6 tablet    Take 1 pill twice a day for 3 days at the first sign of a cold core.

## 2017-06-09 NOTE — NURSING NOTE
Chief Complaint   Patient presents with     Cystoscopy     [facility]       Hina Mckeon MA

## 2017-06-28 DIAGNOSIS — N40.1 BENIGN NON-NODULAR PROSTATIC HYPERPLASIA WITH LOWER URINARY TRACT SYMPTOMS: ICD-10-CM

## 2017-06-28 RX ORDER — ALFUZOSIN HYDROCHLORIDE 10 MG/1
TABLET, EXTENDED RELEASE ORAL
Qty: 90 TABLET | Refills: 0 | Status: SHIPPED | OUTPATIENT
Start: 2017-06-28 | End: 2017-09-20

## 2017-07-05 ENCOUNTER — OFFICE VISIT (OUTPATIENT)
Dept: DERMATOLOGY | Facility: CLINIC | Age: 77
End: 2017-07-05

## 2017-07-05 DIAGNOSIS — L57.0 AK (ACTINIC KERATOSIS): Primary | ICD-10-CM

## 2017-07-05 DIAGNOSIS — Z85.828 HISTORY OF NONMELANOMA SKIN CANCER: ICD-10-CM

## 2017-07-05 DIAGNOSIS — L82.1 SK (SEBORRHEIC KERATOSIS): ICD-10-CM

## 2017-07-05 DIAGNOSIS — L81.4 LENTIGINES: ICD-10-CM

## 2017-07-05 DIAGNOSIS — D18.01 CHERRY ANGIOMA: ICD-10-CM

## 2017-07-05 RX ORDER — KETOROLAC TROMETHAMINE 5 MG/ML
SOLUTION OPHTHALMIC
COMMUNITY
Start: 2017-02-05 | End: 2017-12-15

## 2017-07-05 ASSESSMENT — PAIN SCALES - GENERAL: PAINLEVEL: MILD PAIN (2)

## 2017-07-05 NOTE — LETTER
7/5/2017       RE: Jacinto Flannery  PO   [place] [address]     Dear Colleague,    Thank you for referring your patient, Jacinto Flannery, to the [facility] DERMATOLOGY at [facility]. Please see a copy of my visit note below.    [facility] Dermatology Note      Dermatology Problem List:  1.  NMSC  -SCC, left (dorsal) forearm, s/p excision 8/18/2015  -SCC, right dorsal hand, s/p Mohs 3/18/2015  2. Compound nevus with moderate dysplasia, mid upper back  -s/p biopsy 10/16/2015  3. Actinic keratosis    -s/p cryotherapy 07/20/2016  -efudex in November 2016 (used for 19 days), pt had a very robust response  -pt given hydrocortisone 2.5% cream BID PRN due to severe response and told to stop efudex  4. Unknown skin eruption penis. Resolved 7/5/17  -s/p triamcinolone cream initiated 3/17/2016  -s/p Nystatin initiated 3/17/2016  -s/p Mupirocin initiated 2/16/2015  -s/p Valtrex initiated 2/6/2015  -s/p biopsy showing hemorraghic scale crust and ulceration 2/3/2015  5. Drug exanthem, 2/2 to gamsulosin  -s/p biopsy showing interface dermatitis 12/29/2014  -s/p lidex initiated 1/8/2015  -s/p clobetasol initiated 12/29/2014-improved  6. **Grey 1 mm macule central forehead. Suspect potentially BLK vs. STARR vs lentigo vs others. Rhomboid pseudonetwork with small grey pigment dots. Will monitor. Suspect benign.**    Last FBSE: 7/5/2017       Encounter Date: Jul 5, 2017    CC:  Chief Complaint   Patient presents with     Derm Problem     Jacinto is here today for a skin check.          History of Present Illness:  Mr. Jacinto Flannery is a 77 year old male who presents for FBSE. He has no major sites of concern. NOthing bleeding, tender, growing. He does note occasionally raised spot on the right forearm. He also has had a stable spot on the left malar cheek that is unchanging and non-irritated. He has noted nothing of concern on the forehead. Continues to endorse excellent daily sun  protection. Additionally, requests ongoing 6 mo skin examinations.      Past Medical History:   Patient Active Problem List   Diagnosis     CAD S/P percutaneous coronary angioplasty     CAD (coronary artery disease)     BPH (benign prostatic hyperplasia)     Hyperlipidemia with target LDL less than 70     S/P coronary artery stent placement     GERD (gastroesophageal reflux disease)     S/P TURP     Dermatitis     AK (actinic keratosis)     SCC (squamous cell carcinoma)     SK (seborrheic keratosis)     EIC (epidermal inclusion cyst)     History of squamous cell carcinoma     Seborrheic keratosis     Inflamed seborrheic keratosis     History of dysplastic nevus     Varicose veins     Medication reaction, initial encounter     Past Medical History:   Diagnosis Date     BPH (benign prostatic hyperplasia)      CAD (coronary artery disease)     MI 1989, CABG 1990, 11/2014 PCI to LM, LCx and RCA     GERD (gastroesophageal reflux disease)      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     Hyperlipidaemia LDL goal < 70      Hypertension      Insomnia      NSTEMI (non-ST elevated myocardial infarction) (H) 11/15/2014    Type 4a     S/P coronary artery stent placement 11/12/14    x 4 ANKUR's     Skin disease      Squamous cell carcinoma      Past Surgical History:   Procedure Laterality Date     C CABG, VEIN, THREE  1990    SVG-LAD in [place]     CHOLECYSTECTOMY  1992    in [place]     GENITOURINARY SURGERY       HEART CATH STENT COR W/WO PTCA  11/12/2014    x 4 ANKUR's (two ostial to mid-RCA, one ostial LCx and mid-LM, one ostial LAD)     HERNIA REPAIR Right 2010    in [place]     LASER KTP GREEN LIGHT PHOTOSELECTIVE VAPORIZATION PROSTATE N/A 1/5/2015    Procedure: LASER KTP GREEN LIGHT PHOTOSELECTIVE VAPORIZATION PROSTATE;  Surgeon: Natalie Porter MD;  Location: [facility] OR     MOHS MICROGRAPHIC PROCEDURE         Social History:  Reviewed and unchanged.     Family History:  Not obtained at this visit.      Medications:  Current Outpatient Prescriptions   Medication Sig Dispense Refill     ketorolac (ACULAR) 0.5 % ophthalmic solution        alfuzosin (UROXATRAL) 10 MG 24 hr tablet TAKE ONE TABLET BY MOUTH ONCE DAILY 90 tablet 0     eszopiclone (LUNESTA) 2 MG tablet Take 1 tablet (2 mg) by mouth nightly as needed for sleep 15 tablet 2     metoprolol (TOPROL-XL) 25 MG 24 hr tablet TAKE ONE-HALF TABLET BY MOUTH ONCE DAILY 45 tablet 3     pantoprazole (PROTONIX) 40 MG EC tablet Take 1 tablet (40 mg) by mouth every morning 90 tablet 3     simvastatin (ZOCOR) 40 MG tablet Take 1 tablet (40 mg) by mouth At Bedtime 90 tablet 3     fluorouracil (EFUDEX) 5 % cream Apply to to face, nose, upper lip either once a day for 4 weeks or 2-3x a week for 6 months. Wash hands afterwards. 40 g 0     valACYclovir (VALTREX) 1000 mg tablet Take 1 pill twice a day for 3 days at the first sign of a cold core. 6 tablet 3     alfuzosin (UROXATRAL) 10 MG 24 hr tablet TAKE ONE TABLET BY MOUTH ONCE DAILY 90 tablet 3     carBAMazepine (TEGRETOL) 200 MG tablet Take 1 tablet (200 mg) by mouth 2 times daily 60 tablet 3     order for DME Equipment being ordered: Orthotics for both feet, need replacement 1 Package 1     nitroglycerin (NITROSTAT) 0.4 MG SL tablet Place 1 tablet (0.4 mg) under the tongue every 5 minutes as needed for chest pain 25 tablet 3     aspirin 81 MG tablet Take 81 mg by mouth daily       Cyanocobalamin (B-12) 1000 MCG CAPS Take 1 capsule by mouth daily          Allergies   Allergen Reactions     Macrodantin [Nitrofurantoin] Rash     Sulfa Drugs Rash     Tamsulosin Rash       Review of Systems:  -Constitutional: The patient is otherwise feeling well.   -Skin: As above in HPI. No additional skin concerns. No other lesions that are spontaneously bleeding, ulcerating, or not healing.       Physical exam:  Vitals: There were no vitals taken for this visit.  GEN: This is a well-nourished, well developed male in no acute  distress  NEURO: Alert and oriented  PSYCH: in a pleasant mood, appropriate affect  SKIN: Full skin, which includes the head/face, both arms, chest, back, abdomen,both legs, genitalia and/or groin buttocks, digits and/or nails, was examined.   -Cecil 1-II skin. Minimal tan.   -Small, grey 1-2 mm macule central forehead.  Rhomboid pseudonetwork with small grey pigment dots.   -Scattered brown macules on sun exposed areas.  -2 skin tags on the medial thighs over the groin area.   -There are waxy stuck on tan to brown papules on the trunk. arms and legs, face. Largest is 7 mm on the left malar cheek and similarly sized on the bilateral back.   -Sites of prior treated NMSC without evidence of lesional recurrence of concern.  -Penis and groin is clear.   -No other lesions of concern on areas examined.       Impression/Plan:  1. Lesion to monitor: Grey 1-2 mm macule central forehead. Suspect potentially BLK vs. STARR vs lentigo  vs others. Rhomboid pseudonetwork with small grey pigment dots. Will monitor. Suspect benign.    2.  History of NMSC with skin cancer screening: no evidence of recurrence. Emphasized sun protection. Gave info on sun clothing and sun screen. Handouts provided.     3. Benign skin findings, including: Solar lentigines, Seborrheic keratosis, Cherry angiomata,  Acrochordon     Benign nature was discussed. No further intervention required at this time.   4. Groin rash; resolved. No ongoing treatment.     Follow-up in 6 months, earlier for new or changing lesions. Patient requests [id number] appts.      Staff Involved: Dr. Quarles, Resident-Bijan Thakkar MD  PGY3 Dermatology  [phone]      .I, Anjelica Quarles MD, saw this patient with the resident and agree with the resident s findings and plan of care as documented in the resident s note.        Again, thank you for allowing me to participate in the care of your patient.      Sincerely,    Anjelica Quarles MD

## 2017-07-05 NOTE — PATIENT INSTRUCTIONS
The ABCDEs of Melanoma    Skin cancer can develop anywhere on the skin. Ask someone for help when checking your skin, especially in hard to see places. If you notice a mole different from others, or that changes, enlarges, itches, or bleeds (even if it is small), you should see a dermatologist.

## 2017-07-05 NOTE — PROGRESS NOTES
[facility] Dermatology Note      Dermatology Problem List:  1.  NMSC  -SCC, left (dorsal) forearm, s/p excision 8/18/2015  -SCC, right dorsal hand, s/p Mohs 3/18/2015  2. Compound nevus with moderate dysplasia, mid upper back  -s/p biopsy 10/16/2015  3. Actinic keratosis    -s/p cryotherapy 07/20/2016  -efudex in November 2016 (used for 19 days), pt had a very robust response  -pt given hydrocortisone 2.5% cream BID PRN due to severe response and told to stop efudex  4. Unknown skin eruption penis. Resolved 7/5/17  -s/p triamcinolone cream initiated 3/17/2016  -s/p Nystatin initiated 3/17/2016  -s/p Mupirocin initiated 2/16/2015  -s/p Valtrex initiated 2/6/2015  -s/p biopsy showing hemorraghic scale crust and ulceration 2/3/2015  5. Drug exanthem, 2/2 to gamsulosin  -s/p biopsy showing interface dermatitis 12/29/2014  -s/p lidex initiated 1/8/2015  -s/p clobetasol initiated 12/29/2014-improved  6. **Grey 1 mm macule central forehead. Suspect potentially BLK vs. STARR vs lentigo vs others. Rhomboid pseudonetwork with small grey pigment dots. Will monitor. Suspect benign.**    Last FBSE: 7/5/2017       Encounter Date: Jul 5, 2017    CC:  Chief Complaint   Patient presents with     Derm Problem     Jacinto is here today for a skin check.          History of Present Illness:  Mr. Jacinto Flannery is a 77 year old male who presents for FBSE. He has no major sites of concern. NOthing bleeding, tender, growing. He does note occasionally raised spot on the right forearm. He also has had a stable spot on the left malar cheek that is unchanging and non-irritated. He has noted nothing of concern on the forehead. Continues to endorse excellent daily sun protection. Additionally, requests ongoing 6 mo skin examinations.      Past Medical History:   Patient Active Problem List   Diagnosis     CAD S/P percutaneous coronary angioplasty     CAD (coronary artery disease)     BPH (benign prostatic hyperplasia)      Hyperlipidemia with target LDL less than 70     S/P coronary artery stent placement     GERD (gastroesophageal reflux disease)     S/P TURP     Dermatitis     AK (actinic keratosis)     SCC (squamous cell carcinoma)     SK (seborrheic keratosis)     EIC (epidermal inclusion cyst)     History of squamous cell carcinoma     Seborrheic keratosis     Inflamed seborrheic keratosis     History of dysplastic nevus     Varicose veins     Medication reaction, initial encounter     Past Medical History:   Diagnosis Date     BPH (benign prostatic hyperplasia)      CAD (coronary artery disease)     MI 1989, CABG 1990, 11/2014 PCI to LM, LCx and RCA     GERD (gastroesophageal reflux disease)      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     Hyperlipidaemia LDL goal < 70      Hypertension      Insomnia      NSTEMI (non-ST elevated myocardial infarction) (H) 11/15/2014    Type 4a     S/P coronary artery stent placement 11/12/14    x 4 ANKUR's     Skin disease      Squamous cell carcinoma      Past Surgical History:   Procedure Laterality Date     C CABG, VEIN, THREE  1990    SVG-LAD in [place]     CHOLECYSTECTOMY  1992    in [place]     GENITOURINARY SURGERY       HEART CATH STENT COR W/WO PTCA  11/12/2014    x 4 ANKUR's (two ostial to mid-RCA, one ostial LCx and mid-LM, one ostial LAD)     HERNIA REPAIR Right 2010    in [place]     LASER KTP GREEN LIGHT PHOTOSELECTIVE VAPORIZATION PROSTATE N/A 1/5/2015    Procedure: LASER KTP GREEN LIGHT PHOTOSELECTIVE VAPORIZATION PROSTATE;  Surgeon: Natlaie Porter MD;  Location:  OR     MOHS MICROGRAPHIC PROCEDURE         Social History:  Reviewed and unchanged.     Family History:  Not obtained at this visit.     Medications:  Current Outpatient Prescriptions   Medication Sig Dispense Refill     ketorolac (ACULAR) 0.5 % ophthalmic solution        alfuzosin (UROXATRAL) 10 MG 24 hr tablet TAKE ONE TABLET BY MOUTH ONCE DAILY 90 tablet 0     eszopiclone (LUNESTA) 2 MG tablet Take  1 tablet (2 mg) by mouth nightly as needed for sleep 15 tablet 2     metoprolol (TOPROL-XL) 25 MG 24 hr tablet TAKE ONE-HALF TABLET BY MOUTH ONCE DAILY 45 tablet 3     pantoprazole (PROTONIX) 40 MG EC tablet Take 1 tablet (40 mg) by mouth every morning 90 tablet 3     simvastatin (ZOCOR) 40 MG tablet Take 1 tablet (40 mg) by mouth At Bedtime 90 tablet 3     fluorouracil (EFUDEX) 5 % cream Apply to to face, nose, upper lip either once a day for 4 weeks or 2-3x a week for 6 months. Wash hands afterwards. 40 g 0     valACYclovir (VALTREX) 1000 mg tablet Take 1 pill twice a day for 3 days at the first sign of a cold core. 6 tablet 3     alfuzosin (UROXATRAL) 10 MG 24 hr tablet TAKE ONE TABLET BY MOUTH ONCE DAILY 90 tablet 3     carBAMazepine (TEGRETOL) 200 MG tablet Take 1 tablet (200 mg) by mouth 2 times daily 60 tablet 3     order for DME Equipment being ordered: Orthotics for both feet, need replacement 1 Package 1     nitroglycerin (NITROSTAT) 0.4 MG SL tablet Place 1 tablet (0.4 mg) under the tongue every 5 minutes as needed for chest pain 25 tablet 3     aspirin 81 MG tablet Take 81 mg by mouth daily       Cyanocobalamin (B-12) 1000 MCG CAPS Take 1 capsule by mouth daily          Allergies   Allergen Reactions     Macrodantin [Nitrofurantoin] Rash     Sulfa Drugs Rash     Tamsulosin Rash       Review of Systems:  -Constitutional: The patient is otherwise feeling well.   -Skin: As above in HPI. No additional skin concerns. No other lesions that are spontaneously bleeding, ulcerating, or not healing.       Physical exam:  Vitals: There were no vitals taken for this visit.  GEN: This is a well-nourished, well developed male in no acute distress  NEURO: Alert and oriented  PSYCH: in a pleasant mood, appropriate affect  SKIN: Full skin, which includes the head/face, both arms, chest, back, abdomen,both legs, genitalia and/or groin buttocks, digits and/or nails, was examined.   -Cecil 1-II skin. Minimal tan.   -Small,  grey 1-2 mm macule central forehead.  Rhomboid pseudonetwork with small grey pigment dots.   -Scattered brown macules on sun exposed areas.  -2 skin tags on the medial thighs over the groin area.   -There are waxy stuck on tan to brown papules on the trunk. arms and legs, face. Largest is 7 mm on the left malar cheek and similarly sized on the bilateral back.   -Sites of prior treated NMSC without evidence of lesional recurrence of concern.  -Penis and groin is clear.   -No other lesions of concern on areas examined.       Impression/Plan:  1. Lesion to monitor: Grey 1-2 mm macule central forehead. Suspect potentially BLK vs. STARR vs lentigo  vs others. Rhomboid pseudonetwork with small grey pigment dots. Will monitor. Suspect benign.    2.  History of NMSC with skin cancer screening: no evidence of recurrence. Emphasized sun protection. Gave info on sun clothing and sun screen. Handouts provided.     3. Benign skin findings, including: Solar lentigines, Seborrheic keratosis, Cherry angiomata,  Acrochordon     Benign nature was discussed. No further intervention required at this time.   4. Groin rash; resolved. No ongoing treatment.     Follow-up in 6 months, earlier for new or changing lesions. Patient requests [id number] appts.      Staff Involved: Dr. Quarles, Resident-Bijan Thakkar MD  PGY3 Dermatology  [phone]      .I, Anjelica Quarles MD, saw this patient with the resident and agree with the resident s findings and plan of care as documented in the resident s note.

## 2017-07-05 NOTE — NURSING NOTE
Dermatology Rooming Note    Jacinto Flannery's goals for this visit include:   Chief Complaint   Patient presents with     Derm Problem     Jacinto is here today for a skin check.      Niki Stephen MA

## 2017-07-05 NOTE — MR AVS SNAPSHOT
After Visit Summary   7/5/2017    Jacinto Flannery    MRN: [id number]           Patient Information     Date Of Birth          1940        Visit Information        Provider Department      7/5/2017 10:00 AM Anjelica Quarles MD [facility] Dermatology        Today's Diagnoses     AK (actinic keratosis)    -  1    Lentigines        SK (seborrheic keratosis)        Cherry angioma        History of nonmelanoma skin cancer          Care Instructions              The ABCDEs of Melanoma    Skin cancer can develop anywhere on the skin. Ask someone for help when checking your skin, especially in hard to see places. If you notice a mole different from others, or that changes, enlarges, itches, or bleeds (even if it is small), you should see a dermatologist.                    Follow-ups after your visit        Follow-up notes from your care team     Return in about 6 months (around 1/5/2018).      Your next 10 appointments already scheduled     Dec 08, 2017  2:30 PM CST   (Arrive by 2:15 PM)   Return Visit with Natalie Porter MD   [facility] and [facility] for Prostate and Urologic Cancers ([facility])    [address] [place] [address] [phone]              Who to contact     Please call your clinic at [phone] to:    Ask questions about your health    Make or cancel appointments    Discuss your medicines    Learn about your test results    Speak to your doctor   If you have compliments or concerns about an experience at your clinic, or if you wish to file a complaint, please contact [facility] Patient Relations at [phone] or email us at [email or web address]         Additional Information About Your Visit        MyChart Information     [employer] gives you secure access to your electronic health record. If you see a primary care provider, you can also send messages to your care team and make  appointments. If you have questions, please call your primary care clinic.  If you do not have a primary care provider, please call [phone] and they will assist you.      [employer] is an electronic gateway that provides easy, online access to your medical records. With [employer], you can request a clinic appointment, read your test results, renew a prescription or communicate with your care team.     To access your existing account, please contact your [facility] or call [phone] for assistance.        Care EveryWhere ID     This is your Care EveryWhere ID. This could be used by other organizations to access your [place] medical records  [id number]         Blood Pressure from Last 3 Encounters:   06/09/17 115/64   04/07/17 123/70   04/07/17 123/70    Weight from Last 3 Encounters:   06/09/17 78 kg (172 lb)   04/07/17 80.3 kg (177 lb)   04/07/17 80.3 kg (177 lb)              Today, you had the following     No orders found for display       Primary Care Provider Office Phone # Fax #    Nadine Isabel Orozco [phone]       [place] [address] [facility] [address]  [facility] [address]        Equal Access to Services     MIGUEL JAUREGUI : Hadii aad ku hadasho Soomaali, waaxda luqadaha, qaybta kaalmada adeegyada, waxay erikin haycaspern lakesha patrick. So [facility] [phone].    ATENCIÓN: Si habla español, tiene a vargas disposición servicios gratuitos de asistencia lingüística. Llame al [phone].    We comply with applicable federal civil rights laws and [place] laws. We do not discriminate on the basis of race, color, national origin, age, disability sex, sexual orientation or gender identity.            Thank you!     Thank you for choosing [facility]  for your care. Our goal is always to provide you with excellent care. Hearing back from our patients is one way we can continue to improve our services. Please take a few minutes to complete  the written survey that you may receive in the mail after your visit with us. Thank you!             Your Updated Medication List - Protect others around you: Learn how to safely use, store and throw away your medicines at www.disposemymeds.org.          This list is accurate as of: 7/5/17 10:24 AM.  Always use your most recent med list.                   Brand Name Dispense Instructions for use Diagnosis    * alfuzosin 10 MG 24 hr tablet    UROXATRAL    90 tablet    TAKE ONE TABLET BY MOUTH ONCE DAILY    BPH loc w urin obs/LUTS       * alfuzosin 10 MG 24 hr tablet    UROXATRAL    90 tablet    TAKE ONE TABLET BY MOUTH ONCE DAILY    Benign non-nodular prostatic hyperplasia with lower urinary tract symptoms       aspirin 81 MG tablet      Take 81 mg by mouth daily        B-12 1000 MCG Caps      Take 1 capsule by mouth daily        carBAMazepine 200 MG tablet    TEGretol    60 tablet    Take 1 tablet (200 mg) by mouth 2 times daily    Trigeminal neuralgia       eszopiclone 2 MG tablet    LUNESTA    15 tablet    Take 1 tablet (2 mg) by mouth nightly as needed for sleep    Insomnia       fluorouracil 5 % cream    EFUDEX    40 g    Apply to to face, nose, upper lip either once a day for 4 weeks or 2-3x a week for 6 months. Wash hands afterwards.    Actinic keratosis       ketorolac 0.5 % ophthalmic solution    ACULAR          metoprolol 25 MG 24 hr tablet    TOPROL-XL    45 tablet    TAKE ONE-HALF TABLET BY MOUTH ONCE DAILY    Coronary artery disease involving bypass graft of transplanted heart without angina pectoris       nitroGLYcerin 0.4 MG sublingual tablet    NITROSTAT    25 tablet    Place 1 tablet (0.4 mg) under the tongue every 5 minutes as needed for chest pain    CAD (coronary artery disease), Status post percutaneous transluminal coronary angioplasty       order for DME     1 Package    Equipment being ordered: Orthotics for both feet, need replacement    Fallen arches       pantoprazole 40 MG EC tablet     PROTONIX    90 tablet    Take 1 tablet (40 mg) by mouth every morning    Other chest pain       simvastatin 40 MG tablet    ZOCOR    90 tablet    Take 1 tablet (40 mg) by mouth At Bedtime    Coronary artery disease involving native coronary artery of native heart without angina pectoris       valACYclovir 1000 mg tablet    VALTREX    6 tablet    Take 1 pill twice a day for 3 days at the first sign of a cold core.    H/O cold sores       * Notice:  This list has 2 medication(s) that are the same as other medications prescribed for you. Read the directions carefully, and ask your doctor or other care provider to review them with you.

## 2017-09-20 DIAGNOSIS — N40.1 BENIGN NON-NODULAR PROSTATIC HYPERPLASIA WITH LOWER URINARY TRACT SYMPTOMS: ICD-10-CM

## 2017-09-20 RX ORDER — ALFUZOSIN HYDROCHLORIDE 10 MG/1
TABLET, EXTENDED RELEASE ORAL
Qty: 90 TABLET | Refills: 3 | Status: SHIPPED | OUTPATIENT
Start: 2017-09-20 | End: 2018-09-24

## 2017-09-25 ENCOUNTER — TELEPHONE (OUTPATIENT)
Dept: DERMATOLOGY | Facility: CLINIC | Age: 77
End: 2017-09-25

## 2017-09-25 NOTE — TELEPHONE ENCOUNTER
----- Message from Anjelica Herring MD sent at 9/25/2017  9:14 AM CDT -----  Regarding: RE: SPOT ON FOREHEAD HAS GOTTEN BIGGER - DR HERRING  Contact: [phone]  Yes,  ----- Message -----     From: Ghada Ibrahim     Sent: 9/25/2017   9:09 AM       To: Anjelica Herring MD  Subject: FW: SPOT ON FOREHEAD HAS GOTTEN BIGGER - DR Arechiga    Want me to schedule him in next available ACC?  ----- Message -----     From: Olinda Lemons     Sent: 9/25/2017   8:13 AM       To: Derm Triage-Ump  Subject: SPOT ON FOREHEAD HAS GOTTEN BIGGER - DR HARRISON#    Pt's spot on forehead has gotten bigger and was informed by Dr. Herring if this happened, he needed to come back in to see her again as soon as possible. Pt is traveling this morning, but you can leave a message.    Pt can be reached at [phone].    Thank you,    Olinda  Call Center    Please DO NOT send message and or reply back to sender. Call center Representatives DO NOT respond to Messages.

## 2017-09-25 NOTE — TELEPHONE ENCOUNTER
Left message asking patient to call clinic back. Want to know if he can come in on 10/9 at 1:45 to see Dr. Quarles.

## 2017-09-26 NOTE — TELEPHONE ENCOUNTER
Left message asking if patient can come in on 10/16 at 1:15 to see Dr. Quarles.  Asked patient to call clinic back.

## 2017-10-11 DIAGNOSIS — G50.0 TRIGEMINAL NEURALGIA: ICD-10-CM

## 2017-10-14 NOTE — TELEPHONE ENCOUNTER
carBAMazepine   Last Written Prescription Date:  6/8/16   Last Fill Quantity: 60,   # refills: 3  Last Office Visit : 4/7/17  Future Office visit:  NONE  CBC RESULTS:   Recent Labs   Lab Test  04/01/16   1108   WBC  5.5   RBC  4.64   HGB  14.1   HCT  41.6   MCV  90   MCH  30.4   MCHC  33.9   RDW  13.2   PLT  126*   No results found for: TSH]  Lab Results   Component Value Date    AST 26 06/08/2015     Lab Results   Component Value Date    ALT 25 06/08/2015   Routing refill request to provider for review/approval because: labs are overdue 2015,16  & NO TSH , route to provider

## 2017-10-16 ENCOUNTER — OFFICE VISIT (OUTPATIENT)
Dept: DERMATOLOGY | Facility: CLINIC | Age: 77
End: 2017-10-16

## 2017-10-16 DIAGNOSIS — L81.8 TRAUMATIC TATTOO: Primary | ICD-10-CM

## 2017-10-16 DIAGNOSIS — L71.9 ACNE ROSACEA: ICD-10-CM

## 2017-10-16 DIAGNOSIS — L82.1 SK (SEBORRHEIC KERATOSIS): ICD-10-CM

## 2017-10-16 NOTE — NURSING NOTE
"Dermatology Rooming Note    Jacinto Flannery's goals for this visit include:   Chief Complaint   Patient presents with     Derm Problem     Jacinto is here today for a spot on his forehead that he thinks has expanded, he also has some other spots he would like looked at\"     KAYA Tony    "

## 2017-10-16 NOTE — PROGRESS NOTES
"[facility] Dermatology Note      Dermatology Problem List:  1.  NMSC  -SCC, left (dorsal) forearm, s/p excision 8/18/2015  -SCC, right dorsal hand, s/p Mohs 3/18/2015  2. Compound nevus with moderate dysplasia, mid upper back  -s/p biopsy 10/16/2015  3. Actinic keratosis    -s/p cryotherapy 07/20/2016  -efudex in November 2016 (used for 19 days), pt had a very robust response  -pt given hydrocortisone 2.5% cream BID PRN due to severe response and told to stop efudex  4. Unknown skin eruption penis. Resolved 7/5/17  -s/p triamcinolone cream initiated 3/17/2016  -s/p Nystatin initiated 3/17/2016  -s/p Mupirocin initiated 2/16/2015  -s/p Valtrex initiated 2/6/2015  -s/p biopsy showing hemorraghic scale crust and ulceration 2/3/2015  5. Drug exanthem, 2/2 to gamsulosin  -s/p biopsy showing interface dermatitis 12/29/2014  -s/p lidex initiated 1/8/2015  -s/p clobetasol initiated 12/29/2014-improved  6. Traumatic tattoo  -Central forehead, measured 1X1.5cm on 10/16/17  7. Rosacea  -Metronidazole cream       Encounter Date: Oct 16, 2017    CC:   Chief Complaint   Patient presents with     Derm Problem     Jacinto is here today for a spot on his forehead that he thinks has expanded, he also has some other spots he would like looked at\"         History of Present Illness:  Mr. Jacinto Flannery is a 77 year old male who presents to acute care clinic for a spot on the forehead. The patient was last seen in August 2017 when a dark spot on his central forehead was observed. The pt thinks this may have increased in size, however, he is not sure. He states it does not itch, hurt, or bleed. He does admit that over the summer he got several red bumps on the tip of his nose. He states they do not itch, hurt, or bleed. He thinks this is due to applying sunscreen too thickly. Overall, the pt states he feels well, and he denies any other general or skin-specific complaints at this time.      Past Medical History: "   Patient Active Problem List   Diagnosis     CAD S/P percutaneous coronary angioplasty     CAD (coronary artery disease)     BPH (benign prostatic hyperplasia)     Hyperlipidemia with target LDL less than 70     S/P coronary artery stent placement     GERD (gastroesophageal reflux disease)     S/P TURP     Dermatitis     AK (actinic keratosis)     SCC (squamous cell carcinoma)     SK (seborrheic keratosis)     EIC (epidermal inclusion cyst)     History of squamous cell carcinoma     Seborrheic keratosis     Inflamed seborrheic keratosis     History of dysplastic nevus     Varicose veins     Medication reaction, initial encounter     Past Medical History:   Diagnosis Date     BPH (benign prostatic hyperplasia)      CAD (coronary artery disease)     MI 1989, CABG 1990, 11/2014 PCI to LM, LCx and RCA     GERD (gastroesophageal reflux disease)      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     Hyperlipidaemia LDL goal < 70      Hypertension      Insomnia      NSTEMI (non-ST elevated myocardial infarction) (H) 11/15/2014    Type 4a     S/P coronary artery stent placement 11/12/14    x 4 ANKUR's     Skin disease      Squamous cell carcinoma      Past Surgical History:   Procedure Laterality Date     C CABG, VEIN, THREE  1990    SVG-LAD in [place]     CHOLECYSTECTOMY  1992    in [place]     GENITOURINARY SURGERY       HEART CATH STENT COR W/WO PTCA  11/12/2014    x 4 ANKUR's (two ostial to mid-RCA, one ostial LCx and mid-LM, one ostial LAD)     HERNIA REPAIR Right 2010    in [place]     LASER KTP GREEN LIGHT PHOTOSELECTIVE VAPORIZATION PROSTATE N/A 1/5/2015    Procedure: LASER KTP GREEN LIGHT PHOTOSELECTIVE VAPORIZATION PROSTATE;  Surgeon: Natalie Porter MD;  Location: [facility] OR     MOHS MICROGRAPHIC PROCEDURE           Family History:  Family History   Problem Relation Age of Onset     CANCER Father      Stomach     HEART DISEASE Mother      HEART DISEASE Maternal Grandmother      Skin Cancer No family hx of         Medications:  Current Outpatient Prescriptions   Medication Sig Dispense Refill     alfuzosin (UROXATRAL) 10 MG 24 hr tablet TAKE ONE TABLET BY MOUTH ONCE DAILY 90 tablet 3     ketorolac (ACULAR) 0.5 % ophthalmic solution        eszopiclone (LUNESTA) 2 MG tablet Take 1 tablet (2 mg) by mouth nightly as needed for sleep 15 tablet 2     metoprolol (TOPROL-XL) 25 MG 24 hr tablet TAKE ONE-HALF TABLET BY MOUTH ONCE DAILY 45 tablet 3     pantoprazole (PROTONIX) 40 MG EC tablet Take 1 tablet (40 mg) by mouth every morning 90 tablet 3     simvastatin (ZOCOR) 40 MG tablet Take 1 tablet (40 mg) by mouth At Bedtime 90 tablet 3     fluorouracil (EFUDEX) 5 % cream Apply to to face, nose, upper lip either once a day for 4 weeks or 2-3x a week for 6 months. Wash hands afterwards. 40 g 0     valACYclovir (VALTREX) 1000 mg tablet Take 1 pill twice a day for 3 days at the first sign of a cold core. 6 tablet 3     carBAMazepine (TEGRETOL) 200 MG tablet Take 1 tablet (200 mg) by mouth 2 times daily 60 tablet 3     order for DME Equipment being ordered: Orthotics for both feet, need replacement 1 Package 1     nitroglycerin (NITROSTAT) 0.4 MG SL tablet Place 1 tablet (0.4 mg) under the tongue every 5 minutes as needed for chest pain 25 tablet 3     aspirin 81 MG tablet Take 81 mg by mouth daily       Cyanocobalamin (B-12) 1000 MCG CAPS Take 1 capsule by mouth daily           Allergies   Allergen Reactions     Macrodantin [Nitrofurantoin] Rash     Sulfa Drugs Rash     Tamsulosin Rash         Review of Systems:  -Skin Establ Pt: The patient denies any new rash, pruritus, or lesions that are symptomatic, changing or bleeding, except as per HPI.  -Constitutional: The patient denies fatigue, fevers, chills, unintended weight loss, and night sweats.  -HEENT: Patient denies nonhealing oral sores.  -Skin: As above in HPI. No additional skin concerns.    Physical exam:  Vitals: There were no vitals taken for this visit.  GEN: This is a  well developed, well-nourished male in no acute distress, in a pleasant mood  HEENT: no growths or discoloration of the eyelids, conjunctiva, or lips   SKIN: Focused examination of the face, neck, left arms, and bilateral hands was performed  -A dark macule without a true pigment network measuring 1X1.5cm on the central forehead  -There are waxy stuck on tan to brown papules on the face, neck, and arms.  -Several small erythematous papules that are not friable of shiny on the anterior nose  -No other lesions of concern on areas examined.     Impression/Plan:  1. Traumatic tattoo    Reassured pt about benign nature; no treatment required at this time    2. Seborrheic keratoses    Reassured pt about benign nature; no treatment required at this time    3. Rosacea    Believe the pt's papules on the nose are due to rosacea. Reviewed with pt that many patients have specific food triggers, and he should be on the look out for any he can associated and thus exclude    Will start metronidazole cream BID        Follow-up in 9 months, earlier for new or changing lesions.        staffed the patient.    Staff Involved:  Resident(Raymon Magana)/Staff(as above)  .I, Anjelica Quarles MD, saw this patient with the resident and agree with the resident s findings and plan of care as documented in the resident s note.

## 2017-10-16 NOTE — MR AVS SNAPSHOT
After Visit Summary   10/16/2017    Jacinto Flannery    MRN: [id number]           Patient Information     Date Of Birth          1940        Visit Information        Provider Department      10/16/2017 1:15 PM Anjelica Quarles MD [facility] Dermatology        Today's Diagnoses     Acne rosacea    -  1      Care Instructions    1. Apply metronidazole to your nose twice a day. We believe this is rosacea          Follow-ups after your visit        Your next 10 appointments already scheduled     Dec 08, 2017  2:30 PM CST   (Arrive by 2:15 PM)   Return Visit with Natalie Porter MD   [facility] for Prostate and Urologic Cancers ([facility])    [address] [address] [phone]              Who to contact     Please call your clinic at [phone] to:    Ask questions about your health    Make or cancel appointments    Discuss your medicines    Learn about your test results    Speak to your doctor   If you have compliments or concerns about an experience at your clinic, or if you wish to file a complaint, please contact [facility] Patient Relations at [phone] or email us at [email or web address]         Additional Information About Your Visit        MyChart Information     [employer] gives you secure access to your electronic health record. If you see a primary care provider, you can also send messages to your care team and make appointments. If you have questions, please call your primary care clinic.  If you do not have a primary care provider, please call [phone] and they will assist you.      [employer] is an electronic gateway that provides easy, online access to your medical records. With [employer], you can request a clinic appointment, read your test results, renew a prescription or communicate with your care team.     To access your existing account, please contact  your [facility] or call [phone] for assistance.        Care EveryWhere ID     This is your Care EveryWhere ID. This could be used by other organizations to access your [place] medical records  [id number]         Blood Pressure from Last 3 Encounters:   06/09/17 115/64   04/07/17 123/70   04/07/17 123/70    Weight from Last 3 Encounters:   06/09/17 78 kg (172 lb)   04/07/17 80.3 kg (177 lb)   04/07/17 80.3 kg (177 lb)              Today, you had the following     No orders found for display         Today's Medication Changes          These changes are accurate as of: 10/16/17  1:25 PM.  If you have any questions, ask your nurse or doctor.               Start taking these medicines.        Dose/Directions    metroNIDAZOLE 0.75 % cream   Commonly known as:  METROCREAM   Used for:  Acne rosacea   Started by:  Anjelica Quarles MD        Apply topically 2 times daily   Quantity:  45 g   Refills:  11         These medicines have changed or have updated prescriptions.        Dose/Directions    alfuzosin 10 MG 24 hr tablet   Commonly known as:  UROXATRAL   This may have changed:  Another medication with the same name was removed. Continue taking this medication, and follow the directions you see here.   Used for:  Benign non-nodular prostatic hyperplasia with lower urinary tract symptoms   Changed by:  Natalie Porter MD        TAKE ONE TABLET BY MOUTH ONCE DAILY   Quantity:  90 tablet   Refills:  3            Where to get your medicines      These medications were sent to [facility] [place] [id number] [facility] [address] [place] [address] [address] [place] [address]    Hours:  TRANSPLANT PHONE NUMBER [phone] Phone:  [phone]     metroNIDAZOLE 0.75 % cream                Primary Care Provider Office Phone # Fax #    Nadine Orozco [phone]       [address] [place] [address]         Equal Access to Services     [facility]: Hadii aad ku hadrickclarke Galinakaren, wadelmyda luqadaha, qaybta kagoldiejennifer estevez. So [facility] [phone].    ATENCIÓN: Si habla jakub, tiene a vargas disposición servicios gratuitos de asistencia lingüística. Devoname al [phone].    We comply with applicable federal civil rights laws and [place] laws. We do not discriminate on the basis of race, color, national origin, age, disability, sex, sexual orientation, or gender identity.            Thank you!     Thank you for choosing [facility] DERMATOLOGY  for your care. Our goal is always to provide you with excellent care. Hearing back from our patients is one way we can continue to improve our services. Please take a few minutes to complete the written survey that you may receive in the mail after your visit with us. Thank you!             Your Updated Medication List - Protect others around you: Learn how to safely use, store and throw away your medicines at www.disposemymeds.org.          This list is accurate as of: 10/16/17  1:25 PM.  Always use your most recent med list.                   Brand Name Dispense Instructions for use Diagnosis    alfuzosin 10 MG 24 hr tablet    UROXATRAL    90 tablet    TAKE ONE TABLET BY MOUTH ONCE DAILY    Benign non-nodular prostatic hyperplasia with lower urinary tract symptoms       aspirin 81 MG tablet      Take 81 mg by mouth daily        B-12 1000 MCG Caps      Take 1 capsule by mouth daily        carBAMazepine 200 MG tablet    TEGretol    60 tablet    Take 1 tablet (200 mg) by mouth 2 times daily    Trigeminal neuralgia       eszopiclone 2 MG tablet    LUNESTA    15 tablet    Take 1 tablet (2 mg) by mouth nightly as needed for sleep    Insomnia       fluorouracil 5 % cream    EFUDEX    40 g    Apply to to face, nose, upper lip either once a day for 4 weeks or 2-3x a week for 6 months. Wash hands afterwards.    Actinic keratosis       ketorolac  0.5 % ophthalmic solution    ACULAR          metoprolol 25 MG 24 hr tablet    TOPROL-XL    45 tablet    TAKE ONE-HALF TABLET BY MOUTH ONCE DAILY    Coronary artery disease involving bypass graft of transplanted heart without angina pectoris       metroNIDAZOLE 0.75 % cream    METROCREAM    45 g    Apply topically 2 times daily    Acne rosacea       nitroGLYcerin 0.4 MG sublingual tablet    NITROSTAT    25 tablet    Place 1 tablet (0.4 mg) under the tongue every 5 minutes as needed for chest pain    CAD (coronary artery disease), Status post percutaneous transluminal coronary angioplasty       order for DME     1 Package    Equipment being ordered: Orthotics for both feet, need replacement    Fallen arches       pantoprazole 40 MG EC tablet    PROTONIX    90 tablet    Take 1 tablet (40 mg) by mouth every morning    Other chest pain       simvastatin 40 MG tablet    ZOCOR    90 tablet    Take 1 tablet (40 mg) by mouth At Bedtime    Coronary artery disease involving native coronary artery of native heart without angina pectoris       valACYclovir 1000 mg tablet    VALTREX    6 tablet    Take 1 pill twice a day for 3 days at the first sign of a cold core.    H/O cold sores

## 2017-10-16 NOTE — LETTER
"10/16/2017       RE: Jacinto Flannery  PO   [place] [address]     Dear Colleague,    Thank you for referring your patient, Jacinto Flannery, to the [facility] DERMATOLOGY at [facility]. Please see a copy of my visit note below.    [facility] Dermatology Note      Dermatology Problem List:  1.  NMSC  -SCC, left (dorsal) forearm, s/p excision 8/18/2015  -SCC, right dorsal hand, s/p Mohs 3/18/2015  2. Compound nevus with moderate dysplasia, mid upper back  -s/p biopsy 10/16/2015  3. Actinic keratosis    -s/p cryotherapy 07/20/2016  -efudex in November 2016 (used for 19 days), pt had a very robust response  -pt given hydrocortisone 2.5% cream BID PRN due to severe response and told to stop efudex  4. Unknown skin eruption penis. Resolved 7/5/17  -s/p triamcinolone cream initiated 3/17/2016  -s/p Nystatin initiated 3/17/2016  -s/p Mupirocin initiated 2/16/2015  -s/p Valtrex initiated 2/6/2015  -s/p biopsy showing hemorraghic scale crust and ulceration 2/3/2015  5. Drug exanthem, 2/2 to gamsulosin  -s/p biopsy showing interface dermatitis 12/29/2014  -s/p lidex initiated 1/8/2015  -s/p clobetasol initiated 12/29/2014-improved  6. Traumatic tattoo  -Central forehead, measured 1X1.5cm on 10/16/17  7. Rosacea  -Metronidazole cream       Encounter Date: Oct 16, 2017    CC:   Chief Complaint   Patient presents with     Derm Problem     Jacinto is here today for a spot on his forehead that he thinks has expanded, he also has some other spots he would like looked at\"         History of Present Illness:  Mr. Jacinto Flannery is a 77 year old male who presents to acute care clinic for a spot on the forehead. The patient was last seen in August 2017 when a dark spot on his central forehead was observed. The pt thinks this may have increased in size, however, he is not sure. He states it does not itch, hurt, or bleed. He does admit that over the summer he got several red bumps on the tip " of his nose. He states they do not itch, hurt, or bleed. He thinks this is due to applying sunscreen too thickly. Overall, the pt states he feels well, and he denies any other general or skin-specific complaints at this time.      Past Medical History:   Patient Active Problem List   Diagnosis     CAD S/P percutaneous coronary angioplasty     CAD (coronary artery disease)     BPH (benign prostatic hyperplasia)     Hyperlipidemia with target LDL less than 70     S/P coronary artery stent placement     GERD (gastroesophageal reflux disease)     S/P TURP     Dermatitis     AK (actinic keratosis)     SCC (squamous cell carcinoma)     SK (seborrheic keratosis)     EIC (epidermal inclusion cyst)     History of squamous cell carcinoma     Seborrheic keratosis     Inflamed seborrheic keratosis     History of dysplastic nevus     Varicose veins     Medication reaction, initial encounter     Past Medical History:   Diagnosis Date     BPH (benign prostatic hyperplasia)      CAD (coronary artery disease)     MI 1989, CABG 1990, 11/2014 PCI to LM, LCx and RCA     GERD (gastroesophageal reflux disease)      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     Hyperlipidaemia LDL goal < 70      Hypertension      Insomnia      NSTEMI (non-ST elevated myocardial infarction) (H) 11/15/2014    Type 4a     S/P coronary artery stent placement 11/12/14    x 4 ANKUR's     Skin disease      Squamous cell carcinoma      Past Surgical History:   Procedure Laterality Date     C CABG, VEIN, THREE  1990    SVG-LAD in [place]     CHOLECYSTECTOMY  1992    in [place]     GENITOURINARY SURGERY       HEART CATH STENT COR W/WO PTCA  11/12/2014    x 4 ANKUR's (two ostial to mid-RCA, one ostial LCx and mid-LM, one ostial LAD)     HERNIA REPAIR Right 2010    in [place]     LASER KTP GREEN LIGHT PHOTOSELECTIVE VAPORIZATION PROSTATE N/A 1/5/2015    Procedure: LASER KTP GREEN LIGHT PHOTOSELECTIVE VAPORIZATION PROSTATE;  Surgeon: Natalie Porter MD;   Location: [facility] OR     MOHS MICROGRAPHIC PROCEDURE           Family History:  Family History   Problem Relation Age of Onset     CANCER Father      Stomach     HEART DISEASE Mother      HEART DISEASE Maternal Grandmother      Skin Cancer No family hx of        Medications:  Current Outpatient Prescriptions   Medication Sig Dispense Refill     alfuzosin (UROXATRAL) 10 MG 24 hr tablet TAKE ONE TABLET BY MOUTH ONCE DAILY 90 tablet 3     ketorolac (ACULAR) 0.5 % ophthalmic solution        eszopiclone (LUNESTA) 2 MG tablet Take 1 tablet (2 mg) by mouth nightly as needed for sleep 15 tablet 2     metoprolol (TOPROL-XL) 25 MG 24 hr tablet TAKE ONE-HALF TABLET BY MOUTH ONCE DAILY 45 tablet 3     pantoprazole (PROTONIX) 40 MG EC tablet Take 1 tablet (40 mg) by mouth every morning 90 tablet 3     simvastatin (ZOCOR) 40 MG tablet Take 1 tablet (40 mg) by mouth At Bedtime 90 tablet 3     fluorouracil (EFUDEX) 5 % cream Apply to to face, nose, upper lip either once a day for 4 weeks or 2-3x a week for 6 months. Wash hands afterwards. 40 g 0     valACYclovir (VALTREX) 1000 mg tablet Take 1 pill twice a day for 3 days at the first sign of a cold core. 6 tablet 3     carBAMazepine (TEGRETOL) 200 MG tablet Take 1 tablet (200 mg) by mouth 2 times daily 60 tablet 3     order for DME Equipment being ordered: Orthotics for both feet, need replacement 1 Package 1     nitroglycerin (NITROSTAT) 0.4 MG SL tablet Place 1 tablet (0.4 mg) under the tongue every 5 minutes as needed for chest pain 25 tablet 3     aspirin 81 MG tablet Take 81 mg by mouth daily       Cyanocobalamin (B-12) 1000 MCG CAPS Take 1 capsule by mouth daily           Allergies   Allergen Reactions     Macrodantin [Nitrofurantoin] Rash     Sulfa Drugs Rash     Tamsulosin Rash         Review of Systems:  -Skin Establ Pt: The patient denies any new rash, pruritus, or lesions that are symptomatic, changing or bleeding, except as per HPI.  -Constitutional: The patient denies  fatigue, fevers, chills, unintended weight loss, and night sweats.  -HEENT: Patient denies nonhealing oral sores.  -Skin: As above in HPI. No additional skin concerns.    Physical exam:  Vitals: There were no vitals taken for this visit.  GEN: This is a well developed, well-nourished male in no acute distress, in a pleasant mood  HEENT: no growths or discoloration of the eyelids, conjunctiva, or lips   SKIN: Focused examination of the face, neck, left arms, and bilateral hands was performed  -A dark macule without a true pigment network measuring 1X1.5cm on the central forehead  -There are waxy stuck on tan to brown papules on the face, neck, and arms.  -Several small erythematous papules that are not friable of shiny on the anterior nose  -No other lesions of concern on areas examined.     Impression/Plan:  1. Traumatic tattoo    Reassured pt about benign nature; no treatment required at this time    2. Seborrheic keratoses    Reassured pt about benign nature; no treatment required at this time    3. Rosacea    Believe the pt's papules on the nose are due to rosacea. Reviewed with pt that many patients have specific food triggers, and he should be on the look out for any he can associated and thus exclude    Will start metronidazole cream BID        Follow-up in 9 months, earlier for new or changing lesions.        staffed the patient.    Staff Involved:  Resident(Raymon Magana)/Staff(as above)  .I, Anjelica Quarles MD, saw this patient with the resident and agree with the resident s findings and plan of care as documented in the resident s note.

## 2017-10-17 RX ORDER — CARBAMAZEPINE 200 MG/1
1 TABLET ORAL 2 TIMES DAILY
Qty: 180 TABLET | Refills: 3 | Status: SHIPPED | OUTPATIENT
Start: 2017-10-17 | End: 2020-01-31

## 2017-10-24 DIAGNOSIS — I25.10 CAD (CORONARY ARTERY DISEASE): ICD-10-CM

## 2017-10-24 DIAGNOSIS — Z98.61 STATUS POST PERCUTANEOUS TRANSLUMINAL CORONARY ANGIOPLASTY: ICD-10-CM

## 2017-10-25 RX ORDER — NITROGLYCERIN 0.4 MG/1
0.4 TABLET SUBLINGUAL EVERY 5 MIN PRN
Qty: 25 TABLET | Refills: 3 | Status: SHIPPED | OUTPATIENT
Start: 2017-10-25 | End: 2020-01-31

## 2017-11-14 DIAGNOSIS — G47.00 INSOMNIA: ICD-10-CM

## 2017-11-14 DIAGNOSIS — G47.00 PERSISTENT INSOMNIA: Primary | ICD-10-CM

## 2017-11-16 NOTE — TELEPHONE ENCOUNTER
Last Written Prescription Date:  4/21/17  Last Fill Quantity: 15,   # refills: 2  Last Office Visit : 4/7/17  Future Office visit:  1/12/18    Routing refill request to provider for review/approval because:  Drug not on refill protocol or controlled substance

## 2017-11-17 RX ORDER — ESZOPICLONE 2 MG/1
2 TABLET, FILM COATED ORAL
Qty: 15 TABLET | Refills: 2 | Status: SHIPPED | OUTPATIENT
Start: 2017-11-17 | End: 2018-01-12

## 2017-11-27 ENCOUNTER — PRE VISIT (OUTPATIENT)
Dept: UROLOGY | Facility: CLINIC | Age: 77
End: 2017-11-27

## 2017-11-27 NOTE — TELEPHONE ENCOUNTER
Patient is coming in to see  for a 6 month flow/PVR, called patient and told him to come with a full bladder for a flow/PVR

## 2017-12-12 NOTE — TELEPHONE ENCOUNTER
APPT INFO    Date /Time: 12/22/17 at 9AM   Reason for Appt: Rattling in ear   Ref Provider/Clinic: Self   Are there internal records? If yes, list: [facility] - saw Nissen in 2016  Audiogram 2/1/16    Patient Contact (Y/N) & Call Details: No, records in Epic   Action:

## 2017-12-15 ENCOUNTER — OFFICE VISIT (OUTPATIENT)
Dept: UROLOGY | Facility: CLINIC | Age: 77
End: 2017-12-15
Payer: COMMERCIAL

## 2017-12-15 VITALS
DIASTOLIC BLOOD PRESSURE: 79 MMHG | BODY MASS INDEX: 23.99 KG/M2 | HEART RATE: 64 BPM | WEIGHT: 172 LBS | SYSTOLIC BLOOD PRESSURE: 141 MMHG

## 2017-12-15 DIAGNOSIS — R33.9 URINARY RETENTION: Primary | ICD-10-CM

## 2017-12-15 ASSESSMENT — PAIN SCALES - GENERAL: PAINLEVEL: NO PAIN (0)

## 2017-12-15 NOTE — PATIENT INSTRUCTIONS
Please make a appointment for Urodynamic and follow up appointment with      Urodynamic Testing  What You Need to Know  What is urodynamic testing?  Urodynamic testing is the best way for your doctor to look at the function of your bladder. It is like an EKG, which is used to look at the function of your heart.  The test takes between 60 and 90 minutes. You will spend about 11/2 to 2 hours in your doctor s office. For most patients, the test is not painful.  How should I get ready for this test?   Arrive with a full bladder, if you are able. (Try to drink six 8-ounce glasses of liquid one hour before your exam.)    You may want to wear loose clothing.   Make sure your skin below the waist is clean and dry (no lotions or powders).   If you have any of the following symptoms before the test, please call the clinic right away:Having to urinate more often or feeling a sudden urge to go   Feeling pain or burning when you urinate   Fever or chills   Smelly or cloudy urine      If we gave you a bladder diary, please complete and bring it with you.    What happens during this test?  1. You will empty your bladder into a special toilet. The toilet measures your rate of urine flow.  2. We will then place a very small tube (catheter) into your bladder. This drains any urine that is left. We will measure the amount of urine.  3. We will place a small tube in your rectum. We ll also put a tiny patch of electrodes on the skin near the anus.  4. A computer will measure the pressure in your bladder and how well your sphincter is working. (The sphincter is the muscle that controls the bladder.)  5. Your bladder will slowly fill with a mixture of saline and contrast medium. We will take images, similar to an Xray. You will tell us when your bladder feels a bit full, quite full and completely full.  6. We will ask you to bear down several times. As you do, we will check for leaking urine.  7. You will again empty your  bladder into the special toilet.    What happens after the test?  Your doctor will share the results on the day of the exam, or soon after.  After your test, you may go about your day as normal. You may notice some blood in your urine. You may feel a more urgent need to use the toilet, or you may need to go more often. These effects should improve in the next few days.

## 2017-12-15 NOTE — MR AVS SNAPSHOT
After Visit Summary   12/15/2017    Jacinto Flannery    MRN: [id number]           Patient Information     Date Of Birth          1940        Visit Information        Provider Department      12/15/2017 11:00 AM Natalie Porter MD [facility] Urology and [facility]        Care Instructions    Please make a appointment for Urodynamic and follow up appointment with      Urodynamic Testing  What You Need to Know  What is urodynamic testing?  Urodynamic testing is the best way for your doctor to look at the function of your bladder. It is like an EKG, which is used to look at the function of your heart.  The test takes between 60 and 90 minutes. You will spend about 11/2 to 2 hours in your doctor s office. For most patients, the test is not painful.  How should I get ready for this test?   Arrive with a full bladder, if you are able. (Try to drink six 8-ounce glasses of liquid one hour before your exam.)    You may want to wear loose clothing.   Make sure your skin below the waist is clean and dry (no lotions or powders).   If you have any of the following symptoms before the test, please call the clinic right away:Having to urinate more often or feeling a sudden urge to go   Feeling pain or burning when you urinate   Fever or chills   Smelly or cloudy urine      If we gave you a bladder diary, please complete and bring it with you.    What happens during this test?  1. You will empty your bladder into a special toilet. The toilet measures your rate of urine flow.  2. We will then place a very small tube (catheter) into your bladder. This drains any urine that is left. We will measure the amount of urine.  3. We will place a small tube in your rectum. We ll also put a tiny patch of electrodes on the skin near the anus.  4. A computer will measure the pressure in your bladder and how well your sphincter is working. (The sphincter is the muscle that controls the  bladder.)  5. Your bladder will slowly fill with a mixture of saline and contrast medium. We will take images, similar to an Xray. You will tell us when your bladder feels a bit full, quite full and completely full.  6. We will ask you to bear down several times. As you do, we will check for leaking urine.  7. You will again empty your bladder into the special toilet.    What happens after the test?  Your doctor will share the results on the day of the exam, or soon after.  After your test, you may go about your day as normal. You may notice some blood in your urine. You may feel a more urgent need to use the toilet, or you may need to go more often. These effects should improve in the next few days.          Follow-ups after your visit        Your next 10 appointments already scheduled     Dec 22, 2017  9:00 AM CST   (Arrive by 8:45 AM)   New Patient Visit with Esau Barbosa MD   [facility] ([facility])    [address] [place] [address] [id number] [phone]            Jan 12, 2018 10:00 AM CST   (Arrive by 9:45 AM)   PHYSICAL with Nadine Orozco MD   [facility] ([facility])    [address] [place] [address] [id number] [phone]            Feb 06, 2018  9:30 AM CST   (Arrive by 9:15 AM)   Return Visit with Anjelica Quarles MD   [facility] Dermatology ([facility])    [address] [place] [id number] [phone]              Who to contact     Please call your clinic at [phone] to:    Ask questions about your health    Make or cancel appointments    Discuss your medicines    Learn about your test results    Speak to your doctor   If you have compliments or concerns about an experience at your clinic, or if you wish to file a complaint, please contact [facility] Patient Relations at  [phone] or email us at [email or web address]         Additional Information About Your Visit        [employer] Information     [employer] gives you secure access to your electronic health record. If you see a primary care provider, you can also send messages to your care team and make appointments. If you have questions, please call your primary care clinic.  If you do not have a primary care provider, please call [phone] and they will assist you.      [employer] is an electronic gateway that provides easy, online access to your medical records. With [employer], you can request a clinic appointment, read your test results, renew a prescription or communicate with your care team.     To access your existing account, please contact your [facility] or call [phone] for assistance.        Care EveryWhere ID     This is your Care EveryWhere ID. This could be used by other organizations to access your [place] medical records  [id number]        Your Vitals Were     Pulse BMI (Body Mass Index)                64 23.99 kg/m2           Blood Pressure from Last 3 Encounters:   12/15/17 141/79   06/09/17 115/64   04/07/17 123/70    Weight from Last 3 Encounters:   12/15/17 78 kg (172 lb)   06/09/17 78 kg (172 lb)   04/07/17 80.3 kg (177 lb)              Today, you had the following     No orders found for display         Today's Medication Changes          These changes are accurate as of: 12/15/17  2:14 PM.  If you have any questions, ask your nurse or doctor.               These medicines have changed or have updated prescriptions.        Dose/Directions    carBAMazepine 200 MG tablet   Commonly known as:  EPITOL   This may have changed:  Another medication with the same name was removed. Continue taking this medication, and follow the directions you see here.   Used for:  Trigeminal neuralgia   Changed by:  Nadine Alonso MD        Dose:  1 tablet   Take 1 tablet  (200 mg) by mouth 2 times daily   Quantity:  180 tablet   Refills:  3         Stop taking these medicines if you haven't already. Please contact your care team if you have questions.     fluorouracil 5 % cream   Commonly known as:  EFUDEX   Stopped by:  Natalie Porter MD           ketorolac 0.5 % ophthalmic solution   Commonly known as:  ACULAR   Stopped by:  Natalie Porter MD           pantoprazole 40 MG EC tablet   Commonly known as:  PROTONIX   Stopped by:  Natalie Porter MD                    Primary Care Provider Office Phone # Fax #    Nadine Hall Fadi Orozco [phone] [address] [address]        Equal Access to Services     [facility]: Hadii shaggy baker hadricko Sokaren, waaxda luqadaha, qaybta kaalmada adecarleenyada, jennifer anaya . So [facility] [phone].    ATENCIÓN: Si habla español, tiene a vargas disposición servicios gratuitos de asistencia lingüística. [facility] [phone].    We comply with applicable federal civil rights laws and [place] laws. We do not discriminate on the basis of race, color, national origin, age, disability, sex, sexual orientation, or gender identity.            Thank you!     Thank you for choosing [facility] UROLOGY AND [facility] FOR PROSTATE AND UROLOGIC CANCERS  for your care. Our goal is always to provide you with excellent care. Hearing back from our patients is one way we can continue to improve our services. Please take a few minutes to complete the written survey that you may receive in the mail after your visit with us. Thank you!             Your Updated Medication List - Protect others around you: Learn how to safely use, store and throw away your medicines at www.disposemymeds.org.          This list is accurate as of: 12/15/17  2:14 PM.  Always use your most recent med list.                   Brand Name Dispense Instructions for use Diagnosis    alfuzosin 10 MG 24 hr  tablet    UROXATRAL    90 tablet    TAKE ONE TABLET BY MOUTH ONCE DAILY    Benign non-nodular prostatic hyperplasia with lower urinary tract symptoms       aspirin 81 MG tablet      Take 81 mg by mouth daily        B-12 1000 MCG Caps      Take 1 capsule by mouth daily        carBAMazepine 200 MG tablet    EPITOL    180 tablet    Take 1 tablet (200 mg) by mouth 2 times daily    Trigeminal neuralgia       eszopiclone 2 MG tablet    LUNESTA    15 tablet    Take 1 tablet (2 mg) by mouth nightly as needed for sleep    Persistent insomnia       metoprolol 25 MG 24 hr tablet    TOPROL-XL    45 tablet    TAKE ONE-HALF TABLET BY MOUTH ONCE DAILY    Coronary artery disease involving bypass graft of transplanted heart without angina pectoris       metroNIDAZOLE 0.75 % cream    METROCREAM    45 g    Apply topically 2 times daily    Acne rosacea       nitroGLYcerin 0.4 MG sublingual tablet    NITROSTAT    25 tablet    Place 1 tablet (0.4 mg) under the tongue every 5 minutes as needed for chest pain    CAD (coronary artery disease), Status post percutaneous transluminal coronary angioplasty       order for DME     1 Package    Equipment being ordered: Orthotics for both feet, need replacement    Fallen arches       simvastatin 40 MG tablet    ZOCOR    90 tablet    Take 1 tablet (40 mg) by mouth At Bedtime    Coronary artery disease involving native coronary artery of native heart without angina pectoris       valACYclovir 1000 mg tablet    VALTREX    6 tablet    Take 1 pill twice a day for 3 days at the first sign of a cold core.    H/O cold sores

## 2017-12-15 NOTE — NURSING NOTE
Chief Complaint   Patient presents with     Benign Prostatic Hypertrophy     Flow/pvr       Blood pressure 141/79, pulse 64, weight 78 kg (172 lb). Body mass index is 23.99 kg/(m^2).    Patient Active Problem List   Diagnosis     CAD S/P percutaneous coronary angioplasty     CAD (coronary artery disease)     BPH (benign prostatic hyperplasia)     Hyperlipidemia with target LDL less than 70     S/P coronary artery stent placement     GERD (gastroesophageal reflux disease)     S/P TURP     Dermatitis     AK (actinic keratosis)     SCC (squamous cell carcinoma)     SK (seborrheic keratosis)     EIC (epidermal inclusion cyst)     History of squamous cell carcinoma     Seborrheic keratosis     Inflamed seborrheic keratosis     History of dysplastic nevus     Varicose veins     Medication reaction, initial encounter       Allergies   Allergen Reactions     Macrodantin [Nitrofurantoin] Rash     Sulfa Drugs Rash     Tamsulosin Rash       Current Outpatient Prescriptions   Medication Sig Dispense Refill     eszopiclone (LUNESTA) 2 MG tablet Take 1 tablet (2 mg) by mouth nightly as needed for sleep 15 tablet 2     nitroGLYcerin (NITROSTAT) 0.4 MG sublingual tablet Place 1 tablet (0.4 mg) under the tongue every 5 minutes as needed for chest pain 25 tablet 3     carBAMazepine (EPITOL) 200 MG tablet Take 1 tablet (200 mg) by mouth 2 times daily 180 tablet 3     metroNIDAZOLE (METROCREAM) 0.75 % cream Apply topically 2 times daily 45 g 11     alfuzosin (UROXATRAL) 10 MG 24 hr tablet TAKE ONE TABLET BY MOUTH ONCE DAILY 90 tablet 3     metoprolol (TOPROL-XL) 25 MG 24 hr tablet TAKE ONE-HALF TABLET BY MOUTH ONCE DAILY 45 tablet 3     simvastatin (ZOCOR) 40 MG tablet Take 1 tablet (40 mg) by mouth At Bedtime 90 tablet 3     valACYclovir (VALTREX) 1000 mg tablet Take 1 pill twice a day for 3 days at the first sign of a cold core. 6 tablet 3     [DISCONTINUED] carBAMazepine (TEGRETOL) 200 MG tablet Take 1 tablet (200 mg) by mouth 2  times daily 60 tablet 3     order for DME Equipment being ordered: Orthotics for both feet, need replacement 1 Package 1     aspirin 81 MG tablet Take 81 mg by mouth daily       Cyanocobalamin (B-12) 1000 MCG CAPS Take 1 capsule by mouth daily          Social History   Substance Use Topics     Smoking status: Former Smoker     Smokeless tobacco: Never Used     Alcohol use Yes      Comment: rarely       KAYA Ortiz  12/15/2017  12:04 PM

## 2017-12-15 NOTE — LETTER
12/15/2017       RE: Jacinto Flannery  PO   [facility] [address]     Dear Colleague,    Thank you for referring your patient, Jacinto Flannery, to the [facility] UROLOGY AND INST FOR PROSTATE AND UROLOGIC CANCERS at [facility]. Please see a copy of my visit note below.    UROLOGIC DIAGNOSES:       CURRENT INTERVENTIONS:       HISTORY:   Patient with history of AUR s/p PVP complicated by hematuria.       Patient states that compared to five years ago, he is doing great.   Wakes once in the evening. Notes some post void dribbling.   Notes that he does not empty completely, but that this is standard for him.   No further retention or hesitancy.     At last visit, patient had high PVR but cystoscopy revealed open bladder neck and minimal re-growth.   Patient today with PVR of 327ml despite multiple attempts to void. Qmax was 11/6ml/s.    We discussed these findings and opted for CIC daily.   Patient nor nursing staff could pass a catheter (multiple sizes and types used).     We opted for cystoscopy to evaluate bladder neck.            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     Hyperlipidaemia LDL goal < 70      Hypertension      Insomnia      NSTEMI (non-ST elevated myocardial infarction) (H) 11/15/2014    Type 4a     S/P coronary artery stent placement 11/12/14    x 4 ANKUR's     Skin disease      Squamous cell carcinoma        PAST SURGICAL HISTORY:   Past Surgical History:   Procedure Laterality Date     C CABG, VEIN, THREE  1990    SVG-LAD in [place]     CHOLECYSTECTOMY  1992    in [place]     GENITOURINARY SURGERY       HEART CATH STENT COR W/WO PTCA  11/12/2014    x 4 ANKUR's (two ostial to mid-RCA, one ostial LCx and mid-LM, one ostial LAD)      HERNIA REPAIR Right 2010    in [place]     LASER KTP GREEN LIGHT PHOTOSELECTIVE VAPORIZATION PROSTATE N/A 1/5/2015    Procedure: LASER KTP GREEN LIGHT PHOTOSELECTIVE VAPORIZATION PROSTATE;  Surgeon: Natalie Porter MD;  Location: [facility] OR     MOHS MICROGRAPHIC PROCEDURE         FAMILY HISTORY:   Family History   Problem Relation Age of Onset     CANCER Father      Stomach     HEART DISEASE Mother      HEART DISEASE Maternal Grandmother      Skin Cancer No family hx of        SOCIAL HISTORY:   Social History   Substance Use Topics     Smoking status: Former Smoker     Smokeless tobacco: Never Used     Alcohol use Yes      Comment: rarely       Current Outpatient Prescriptions   Medication     eszopiclone (LUNESTA) 2 MG tablet     nitroGLYcerin (NITROSTAT) 0.4 MG sublingual tablet     carBAMazepine (EPITOL) 200 MG tablet     metroNIDAZOLE (METROCREAM) 0.75 % cream     alfuzosin (UROXATRAL) 10 MG 24 hr tablet     metoprolol (TOPROL-XL) 25 MG 24 hr tablet     simvastatin (ZOCOR) 40 MG tablet     valACYclovir (VALTREX) 1000 mg tablet     [DISCONTINUED] carBAMazepine (TEGRETOL) 200 MG tablet     order for DME     aspirin 81 MG tablet     Cyanocobalamin (B-12) 1000 MCG CAPS     No current facility-administered medications for this visit.          PHYSICAL EXAM:    /79  Pulse 64  Wt 78 kg (172 lb)  BMI 23.99 kg/m2    HEENT: Normocephalic and atraumatic   Cardiac: Not done  Back/Flank: Not done  CNS/PNS: Not done  Respiratory: Normal non-labored breathing  Abdomen: Soft nontender and nondistended  Peripheral Vascular: Not done  Mental Status: Not done    Penis: Not done  Scrotal Skin: Not done  Testicles: Not done  Epididymis: Not done  Digital Rectal Exam:     Cystoscopy:     Paulo    December 16, 2017 CYSTOSCOPY:  The patient was brought to the procedures room where he was placed in the supine position.  He was prepped and draped in a sterile fashion.  A #16-Gu[place]alan flexible cystoscope was introduced  into the urinary bladder.  The anterior urethra and membranous urethra were negative.  The prostatic urethra showed TUR defect with some mild regrowth. No evidence of BNC or cause for difficult catheter. Within the urinary bladder, there was no evidence of stones, tumors, or growths.  The ureteral orifices were well visualized bilaterally and found to have clear efflux of urine.   The patient had grade 1-2 trabeculation.    On retroflex view, no lesions were seen.       Imaging: None    Urinalysis: UA RESULTS:  Recent Labs   Lab Test  01/16/15   0053  11/26/14   COLOR  Dark Red   < >  Red   APPEARANCE  Slightly Cloudy   < >  Clear   URINEGLC  Negative   < >  Neg   URINEBILI  Negative   < >  Small   URINEKETONE  5*   < >  Neg   SG  1.005   < >  1.010   UBLD  Large*   < >  Large   URINEPH  6.5   < >  5.0   PROTEIN  100*   < >  Trace   UROBILINOGEN   --    --   0.2   NITRITE  Negative   < >  Pos   LEUKEST  Moderate*   < >  Small   RBCU  47*   < >   --    WBCU  14*   < >   --     < > = values in this interval not displayed.       PSA:     Post Void Residual: as above   Other labs: None today      IMPRESSION:  76 y/o M with history of AUR s/p PVP with several issues post op, now with rising PVRs     PLAN:  Refer for UDS   Follow up after UDs     Total Time: 15 minutes                                       Total in Consultation: greater than 50% (this was separate from time spent in cystoscopy)       Again, thank you for allowing me to participate in the care of your patient.      Sincerely,    Natalie Porter MD

## 2017-12-15 NOTE — PROGRESS NOTES
UROLOGIC DIAGNOSES:       CURRENT INTERVENTIONS:       HISTORY:   Patient with history of AUR s/p PVP complicated by hematuria.       Patient states that compared to five years ago, he is doing great.   Wakes once in the evening. Notes some post void dribbling.   Notes that he does not empty completely, but that this is standard for him.   No further retention or hesitancy.     At last visit, patient had high PVR but cystoscopy revealed open bladder neck and minimal re-growth.   Patient today with PVR of 327ml despite multiple attempts to void. Qmax was 11/6ml/s.    We discussed these findings and opted for CIC daily.   Patient nor nursing staff could pass a catheter (multiple sizes and types used).     We opted for cystoscopy to evaluate bladder neck.            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     Hyperlipidaemia LDL goal < 70      Hypertension      Insomnia      NSTEMI (non-ST elevated myocardial infarction) (H) 11/15/2014    Type 4a     S/P coronary artery stent placement 11/12/14    x 4 ANKUR's     Skin disease      Squamous cell carcinoma        PAST SURGICAL HISTORY:   Past Surgical History:   Procedure Laterality Date     C CABG, VEIN, THREE  1990    SVG-LAD in [place]     CHOLECYSTECTOMY  1992    in [place]     GENITOURINARY SURGERY       HEART CATH STENT COR W/WO PTCA  11/12/2014    x 4 ANKUR's (two ostial to mid-RCA, one ostial LCx and mid-LM, one ostial LAD)     HERNIA REPAIR Right 2010    in [place]     LASER KTP GREEN LIGHT PHOTOSELECTIVE VAPORIZATION PROSTATE N/A 1/5/2015    Procedure: LASER KTP GREEN LIGHT PHOTOSELECTIVE VAPORIZATION PROSTATE;  Surgeon: Natalie Porter MD;  Location: [facility] OR     MOHS MICROGRAPHIC PROCEDURE         FAMILY  HISTORY:   Family History   Problem Relation Age of Onset     CANCER Father      Stomach     HEART DISEASE Mother      HEART DISEASE Maternal Grandmother      Skin Cancer No family hx of        SOCIAL HISTORY:   Social History   Substance Use Topics     Smoking status: Former Smoker     Smokeless tobacco: Never Used     Alcohol use Yes      Comment: rarely       Current Outpatient Prescriptions   Medication     eszopiclone (LUNESTA) 2 MG tablet     nitroGLYcerin (NITROSTAT) 0.4 MG sublingual tablet     carBAMazepine (EPITOL) 200 MG tablet     metroNIDAZOLE (METROCREAM) 0.75 % cream     alfuzosin (UROXATRAL) 10 MG 24 hr tablet     metoprolol (TOPROL-XL) 25 MG 24 hr tablet     simvastatin (ZOCOR) 40 MG tablet     valACYclovir (VALTREX) 1000 mg tablet     [DISCONTINUED] carBAMazepine (TEGRETOL) 200 MG tablet     order for DME     aspirin 81 MG tablet     Cyanocobalamin (B-12) 1000 MCG CAPS     No current facility-administered medications for this visit.          PHYSICAL EXAM:    /79  Pulse 64  Wt 78 kg (172 lb)  BMI 23.99 kg/m2    HEENT: Normocephalic and atraumatic   Cardiac: Not done  Back/Flank: Not done  CNS/PNS: Not done  Respiratory: Normal non-labored breathing  Abdomen: Soft nontender and nondistended  Peripheral Vascular: Not done  Mental Status: Not done    Penis: Not done  Scrotal Skin: Not done  Testicles: Not done  Epididymis: Not done  Digital Rectal Exam:     Cystoscopy:     Paulo    December 16, 2017 CYSTOSCOPY:  The patient was brought to the procedures room where he was placed in the supine position.  He was prepped and draped in a sterile fashion.  A #16-Sinhala flexible cystoscope was introduced into the urinary bladder.  The anterior urethra and membranous urethra were negative.  The prostatic urethra showed TUR defect with some mild regrowth. No evidence of BNC or cause for difficult catheter. Within the urinary bladder, there was no evidence of stones, tumors, or growths.  The  ureteral orifices were well visualized bilaterally and found to have clear efflux of urine.   The patient had grade 1-2 trabeculation.    On retroflex view, no lesions were seen.       Imaging: None    Urinalysis: UA RESULTS:  Recent Labs   Lab Test  01/16/15   0053  11/26/14   COLOR  Dark Red   < >  Red   APPEARANCE  Slightly Cloudy   < >  Clear   URINEGLC  Negative   < >  Neg   URINEBILI  Negative   < >  Small   URINEKETONE  5*   < >  Neg   SG  1.005   < >  1.010   UBLD  Large*   < >  Large   URINEPH  6.5   < >  5.0   PROTEIN  100*   < >  Trace   UROBILINOGEN   --    --   0.2   NITRITE  Negative   < >  Pos   LEUKEST  Moderate*   < >  Small   RBCU  47*   < >   --    WBCU  14*   < >   --     < > = values in this interval not displayed.       PSA:     Post Void Residual: as above   Other labs: None today      IMPRESSION:  78 y/o M with history of AUR s/p PVP with several issues post op, now with rising PVRs     PLAN:  Refer for UDS   Follow up after UDs     Total Time: 15 minutes                                       Total in Consultation: greater than 50% (this was separate from time spent in cystoscopy)

## 2017-12-20 DIAGNOSIS — I25.10 CORONARY ARTERY DISEASE INVOLVING NATIVE CORONARY ARTERY OF NATIVE HEART WITHOUT ANGINA PECTORIS: ICD-10-CM

## 2017-12-22 ENCOUNTER — OFFICE VISIT (OUTPATIENT)
Dept: OTOLARYNGOLOGY | Facility: CLINIC | Age: 77
End: 2017-12-22
Payer: COMMERCIAL

## 2017-12-22 ENCOUNTER — OFFICE VISIT (OUTPATIENT)
Dept: AUDIOLOGY | Facility: CLINIC | Age: 77
End: 2017-12-22
Payer: COMMERCIAL

## 2017-12-22 ENCOUNTER — PRE VISIT (OUTPATIENT)
Dept: OTOLARYNGOLOGY | Facility: CLINIC | Age: 77
End: 2017-12-22

## 2017-12-22 VITALS — WEIGHT: 172 LBS | BODY MASS INDEX: 24.62 KG/M2 | HEIGHT: 70 IN

## 2017-12-22 DIAGNOSIS — H90.3 SENSORINEURAL HEARING LOSS, BILATERAL: Primary | ICD-10-CM

## 2017-12-22 DIAGNOSIS — H90.3 SENSORINEURAL HEARING LOSS (SNHL) OF BOTH EARS: Primary | ICD-10-CM

## 2017-12-22 RX ORDER — SIMVASTATIN 40 MG
40 TABLET ORAL AT BEDTIME
Qty: 90 TABLET | Refills: 0 | Status: SHIPPED | OUTPATIENT
Start: 2017-12-22 | End: 2018-01-12

## 2017-12-22 ASSESSMENT — PAIN SCALES - GENERAL: PAINLEVEL: NO PAIN (0)

## 2017-12-22 NOTE — NURSING NOTE
Chief Complaint   Patient presents with     Consult     nasal congestion, noise in left ear      Jonatan Albrecht LPN

## 2017-12-22 NOTE — LETTER
12/22/2017       RE: Jacinto Flannery     [facility] [address]     Dear Colleague,    Thank you for referring your patient, Jacinto Flannery, to the [facility] EAR NOSE AND THROAT at [facility]. Please see a copy of my visit note below.    The patient presents with a history of hearing loss that was diagnosed in 2016. He reports that this has worsened moderately in both ears. The patient denies sinusitis, facial pain, or purulent nasal discharge.However, he reports nasal congestion and non-purulent rhinitis. The patient denies chronic or recurrent tonsillitis, chronic or recurrent pharyngitis. The patient denies otalgia, otorrhea, eustachian tube dysfunction, ear infections, dizziness or tinnitus. His Audiogram and Tympanogram from 2016 are reviewed with him and they demonstrates bilateral sensorineural hearing loss that is moderate to severe and sloping with an asymmetry of his word recognition scores.    This patient is seen in consultation as a self referral to my clinic.     All other systems were reviewed and they are either negative or they are not directly pertinent to this Otolaryngology examination.      Past Medical History:    Past Medical History:   Diagnosis Date     BPH (benign prostatic hyperplasia)      CAD (coronary artery disease)     MI 1989, CABG 1990, 11/2014 PCI to LM, LCx and RCA     Conductive hearing loss Unknown.     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     Skin disease      Squamous cell carcinoma        Past Surgical History:    Past Surgical History:   Procedure Laterality Date      C CABG, VEIN, THREE  1990    SVG-LAD in [place]     CHOLECYSTECTOMY  1992    in [place]     ENDOSCOPY  04/26/2013    Done at [facility] in [place], [place]     GENITOURINARY SURGERY       HEART CATH STENT COR W/WO PTCA  11/12/2014    x 4 ANKUR's (two ostial to mid-RCA, one ostial LCx and mid-LM, one ostial LAD)     HERNIA REPAIR Right 2010    in [place]     LASER KTP GREEN LIGHT PHOTOSELECTIVE VAPORIZATION PROSTATE N/A 1/5/2015    Procedure: LASER KTP GREEN LIGHT PHOTOSELECTIVE VAPORIZATION PROSTATE;  Surgeon: Natalie Porter MD;  Location:  OR     MOHS MICROGRAPHIC PROCEDURE         Medications:      Current Outpatient Prescriptions:      eszopiclone (LUNESTA) 2 MG tablet, Take 1 tablet (2 mg) by mouth nightly as needed for sleep, Disp: 15 tablet, Rfl: 2     nitroGLYcerin (NITROSTAT) 0.4 MG sublingual tablet, Place 1 tablet (0.4 mg) under the tongue every 5 minutes as needed for chest pain, Disp: 25 tablet, Rfl: 3     carBAMazepine (EPITOL) 200 MG tablet, Take 1 tablet (200 mg) by mouth 2 times daily, Disp: 180 tablet, Rfl: 3     metroNIDAZOLE (METROCREAM) 0.75 % cream, Apply topically 2 times daily, Disp: 45 g, Rfl: 11     alfuzosin (UROXATRAL) 10 MG 24 hr tablet, TAKE ONE TABLET BY MOUTH ONCE DAILY, Disp: 90 tablet, Rfl: 3     metoprolol (TOPROL-XL) 25 MG 24 hr tablet, TAKE ONE-HALF TABLET BY MOUTH ONCE DAILY, Disp: 45 tablet, Rfl: 3     simvastatin (ZOCOR) 40 MG tablet, Take 1 tablet (40 mg) by mouth At Bedtime, Disp: 90 tablet, Rfl: 3     valACYclovir (VALTREX) 1000 mg tablet, Take 1 pill twice a day for 3 days at the first sign of a cold core., Disp: 6 tablet, Rfl: 3     order for DME, Equipment being ordered: Orthotics for both feet, need replacement, Disp: 1 Package, Rfl: 1     aspirin 81 MG tablet, Take 81 mg by mouth daily, Disp: , Rfl:      Cyanocobalamin (B-12) 1000 MCG CAPS, Take 1 capsule by mouth daily , Disp: , Rfl:     Allergies:    Macrodantin [nitrofurantoin]; Sulfa drugs; and  Tamsulosin    Physical Examination:    The patient is a well developed, well nourished male in no apparent distress.  He is normocephalic, atraumatic with pupils equally round and reactive to light.    Oral Cavity Examination:  Normal mucosa with no masses or lesions  Nasal Examination: Normal mucosa with no masses or lesions  Ear Examination: Ear canals clear, tympanic membranes and middle ear spaces normal  Neurological Examination: Facial nerve function intact and symmetric  Integumentary Examination: No lesions on the skin of the head and neck  Neck Examination: No masses or lesions, no lymphadenopathy  Endocrine Examination: Normal thyroid examination    Assessment and Plan:    The patient presents with a history of hearing loss. He will be referred for a repeat of his 2016 Audiogram and Tympanogram and he will be seen again after this testing is completed to discuss further management based upon the findings of the testing.         Again, thank you for allowing me to participate in the care of your patient.      Sincerely,    Esau Barbosa MD

## 2017-12-22 NOTE — MR AVS SNAPSHOT
After Visit Summary   12/22/2017    Jacinto Flannery    MRN: [id number]           Patient Information     Date Of Birth          1940        Visit Information        Provider Department      12/22/2017 9:30 AM Harriet Schwartz AuD [facility]        Today's Diagnoses     Sensorineural hearing loss, bilateral    -  1       Follow-ups after your visit        Your next 10 appointments already scheduled     Jan 12, 2018 10:00 AM CST   (Arrive by 9:45 AM)   PHYSICAL with Nadine Orozco MD   [facility] ([facility])    [address] [place] [id number]   [phone]            Feb 05, 2018  2:00 PM CST   (Arrive by 1:45 PM)   Return Visit with Gill Melendez PA-C   [facility] ([facility])    [address] [place] [id number] [phone]            Feb 06, 2018  9:30 AM CST   (Arrive by 9:15 AM)   Return Visit with Anjelica Quarles MD   [facility] ([facility])    [address] [place] [id number] [phone]            Feb 21, 2018 11:00 AM CST   (Arrive by 10:45 AM)   Urodynamics with Gill Melendez PA-C   [facility] ([facility])    [address] [place] [id number] [phone]              Who to contact     Please call your clinic at [phone] to:    Ask questions about your health    Make or cancel appointments    Discuss your medicines    Learn about your test results    Speak to your doctor   If you have compliments or concerns about an experience at your clinic, or if you wish to file a complaint, please contact [facility] Patient Relations at [phone] or email us at [email or web address]          Additional Information About Your Visit        [employer] Information     [employer] gives you secure access to your electronic health record. If you see a primary care provider, you can also send messages to your care team and make appointments. If you have questions, please call your primary care clinic.  If you do not have a primary care provider, please call [phone] and they will assist you.      [employer] is an electronic gateway that provides easy, online access to your medical records. With [employer], you can request a clinic appointment, read your test results, renew a prescription or communicate with your care team.     To access your existing account, please contact your [facility] or call [phone] for assistance.        Care EveryWhere ID     This is your Care EveryWhere ID. This could be used by other organizations to access your [facility] medical records  [id number]         Blood Pressure from Last 3 Encounters:   12/15/17 141/79   06/09/17 115/64   04/07/17 123/70    Weight from Last 3 Encounters:   12/22/17 78 kg (172 lb)   12/15/17 78 kg (172 lb)   06/09/17 78 kg (172 lb)              We Performed the Following     AUDIOGRAM/TYMPANOGRAM - INTERFACE     [facility] Audiometry Thrshld Eval & Speech Recog ([id number])     Tymps / Reflex   ([id number])        Primary Care Provider Office Phone # Fax #    Nadine Isabel Fadi Orozco [phone]       [address] [address]        Equal Access to Services     MIGUEL JAUREGUI : Hadii aad ku hadasho Soomaali, waaxda luqadaha, qaybta kaalmada adeegyada, waxdiana tacho haycaspern lakesha kharapacheco la'joycelyn patrick. So [facility] [phone].    ATENCIÓN: Si habla español, tiene a vargas disposición servicios gratuitos de asistencia lingüística. Llame al [phone].    We comply with applicable federal civil rights laws and [place] laws. We do not discriminate on the basis of race, color, national origin, age, disability, sex,  sexual orientation, or gender identity.            Thank you!     Thank you for choosing [facility]  for your care. Our goal is always to provide you with excellent care. Hearing back from our patients is one way we can continue to improve our services. Please take a few minutes to complete the written survey that you may receive in the mail after your visit with us. Thank you!             Your Updated Medication List - Protect others around you: Learn how to safely use, store and throw away your medicines at www.disposemymeds.org.          This list is accurate as of: 12/22/17 12:22 PM.  Always use your most recent med list.                   Brand Name Dispense Instructions for use Diagnosis    alfuzosin 10 MG 24 hr tablet    UROXATRAL    90 tablet    TAKE ONE TABLET BY MOUTH ONCE DAILY    Benign non-nodular prostatic hyperplasia with lower urinary tract symptoms       aspirin 81 MG tablet      Take 81 mg by mouth daily        B-12 1000 MCG Caps      Take 1 capsule by mouth daily        carBAMazepine 200 MG tablet    EPITOL    180 tablet    Take 1 tablet (200 mg) by mouth 2 times daily    Trigeminal neuralgia       eszopiclone 2 MG tablet    LUNESTA    15 tablet    Take 1 tablet (2 mg) by mouth nightly as needed for sleep    Persistent insomnia       metoprolol 25 MG 24 hr tablet    TOPROL-XL    45 tablet    TAKE ONE-HALF TABLET BY MOUTH ONCE DAILY    Coronary artery disease involving bypass graft of transplanted heart without angina pectoris       metroNIDAZOLE 0.75 % cream    METROCREAM    45 g    Apply topically 2 times daily    Acne rosacea       nitroGLYcerin 0.4 MG sublingual tablet    NITROSTAT    25 tablet    Place 1 tablet (0.4 mg) under the tongue every 5 minutes as needed for chest pain    CAD (coronary artery disease), Status post percutaneous transluminal coronary angioplasty       order for DME     1 Package    Equipment being ordered: Orthotics for both feet, need replacement    Fallen  fuentes       simvastatin 40 MG tablet    ZOCOR    90 tablet    Take 1 tablet (40 mg) by mouth At Bedtime    Coronary artery disease involving native coronary artery of native heart without angina pectoris       valACYclovir 1000 mg tablet    VALTREX    6 tablet    Take 1 pill twice a day for 3 days at the first sign of a cold core.    H/O cold sores

## 2017-12-22 NOTE — PATIENT INSTRUCTIONS
The patient presents with a history of hearing loss. He will be referred for a repeat of his 2016 Audiogram and Tympanogram and he will be seen again after this testing is completed to discuss further management based upon the findings of the testing.

## 2017-12-22 NOTE — PROGRESS NOTES
The patient presents with a history of hearing loss that was diagnosed in 2016. He reports that this has worsened moderately in both ears. The patient denies sinusitis, facial pain, or purulent nasal discharge.However, he reports nasal congestion and non-purulent rhinitis. The patient denies chronic or recurrent tonsillitis, chronic or recurrent pharyngitis. The patient denies otalgia, otorrhea, eustachian tube dysfunction, ear infections, dizziness or tinnitus. His Audiogram and Tympanogram from 2016 are reviewed with him and they demonstrates bilateral sensorineural hearing loss that is moderate to severe and sloping with an asymmetry of his word recognition scores.    This patient is seen in consultation as a self referral to my clinic.     All other systems were reviewed and they are either negative or they are not directly pertinent to this Otolaryngology examination.      Past Medical History:    Past Medical History:   Diagnosis Date     BPH (benign prostatic hyperplasia)      CAD (coronary artery disease)     MI 1989, CABG 1990, 11/2014 PCI to LM, LCx and RCA     Conductive hearing loss Unknown.     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     Skin disease      Squamous cell carcinoma        Past Surgical History:    Past Surgical History:   Procedure Laterality Date     C CABG, VEIN, THREE  1990    SVG-LAD in [place]     CHOLECYSTECTOMY  1992    in [place]     ENDOSCOPY  04/26/2013    Done at [facility] in [place], [place]     GENITOURINARY SURGERY       HEART CATH STENT COR W/WO PTCA  11/12/2014    x 4 ANKUR's (two ostial to mid-RCA,  one ostial LCx and mid-LM, one ostial LAD)     HERNIA REPAIR Right 2010    in [place]     LASER KTP GREEN LIGHT PHOTOSELECTIVE VAPORIZATION PROSTATE N/A 1/5/2015    Procedure: LASER KTP GREEN LIGHT PHOTOSELECTIVE VAPORIZATION PROSTATE;  Surgeon: Natalie Porter MD;  Location: [facility] OR     MOHS MICROGRAPHIC PROCEDURE         Medications:      Current Outpatient Prescriptions:      eszopiclone (LUNESTA) 2 MG tablet, Take 1 tablet (2 mg) by mouth nightly as needed for sleep, Disp: 15 tablet, Rfl: 2     nitroGLYcerin (NITROSTAT) 0.4 MG sublingual tablet, Place 1 tablet (0.4 mg) under the tongue every 5 minutes as needed for chest pain, Disp: 25 tablet, Rfl: 3     carBAMazepine (EPITOL) 200 MG tablet, Take 1 tablet (200 mg) by mouth 2 times daily, Disp: 180 tablet, Rfl: 3     metroNIDAZOLE (METROCREAM) 0.75 % cream, Apply topically 2 times daily, Disp: 45 g, Rfl: 11     alfuzosin (UROXATRAL) 10 MG 24 hr tablet, TAKE ONE TABLET BY MOUTH ONCE DAILY, Disp: 90 tablet, Rfl: 3     metoprolol (TOPROL-XL) 25 MG 24 hr tablet, TAKE ONE-HALF TABLET BY MOUTH ONCE DAILY, Disp: 45 tablet, Rfl: 3     simvastatin (ZOCOR) 40 MG tablet, Take 1 tablet (40 mg) by mouth At Bedtime, Disp: 90 tablet, Rfl: 3     valACYclovir (VALTREX) 1000 mg tablet, Take 1 pill twice a day for 3 days at the first sign of a cold core., Disp: 6 tablet, Rfl: 3     order for DME, Equipment being ordered: Orthotics for both feet, need replacement, Disp: 1 Package, Rfl: 1     aspirin 81 MG tablet, Take 81 mg by mouth daily, Disp: , Rfl:      Cyanocobalamin (B-12) 1000 MCG CAPS, Take 1 capsule by mouth daily , Disp: , Rfl:     Allergies:    Macrodantin [nitrofurantoin]; Sulfa drugs; and Tamsulosin    Physical Examination:    The patient is a well developed, well nourished male in no apparent distress.  He is normocephalic, atraumatic with pupils equally round and reactive to light.    Oral Cavity Examination:  Normal mucosa with no masses or lesions  Nasal  Examination: Normal mucosa with no masses or lesions  Ear Examination: Ear canals clear, tympanic membranes and middle ear spaces normal  Neurological Examination: Facial nerve function intact and symmetric  Integumentary Examination: No lesions on the skin of the head and neck  Neck Examination: No masses or lesions, no lymphadenopathy  Endocrine Examination: Normal thyroid examination    Assessment and Plan:    The patient presents with a history of hearing loss. He will be referred for a repeat of his 2016 Audiogram and Tympanogram and he will be seen again after this testing is completed to discuss further management based upon the findings of the testing.

## 2017-12-22 NOTE — MR AVS SNAPSHOT
After Visit Summary   12/22/2017    Jacinto Flannery    MRN: [id number]           Patient Information     Date Of Birth          1940        Visit Information        Provider Department      12/22/2017 9:00 AM Esau Barbosa MD [facility] Ear Nose and Throat        Today's Diagnoses     Sensorineural hearing loss (SNHL) of both ears    -  1      Care Instructions    The patient presents with a history of hearing loss. He will be referred for a repeat of his 2016 Audiogram and Tympanogram and he will be seen again after this testing is completed to discuss further management based upon the findings of the testing.           Follow-ups after your visit        Your next 10 appointments already scheduled     Jan 12, 2018 10:00 AM CST   (Arrive by 9:45 AM)   PHYSICAL with Nadine Orozco MD   [facility] ([facility])    [address] [place] [id number] [phone]            Feb 05, 2018  2:00 PM CST   (Arrive by 1:45 PM)   Return Visit with Gill Melendez PA-C   [facility]rology and Inst for Prostate and Urologic Cancers ([facility])    [address] [place] [id number] [phone]            Feb 06, 2018  9:30 AM CST   (Arrive by 9:15 AM)   Return Visit with Anjelica Quarles MD   [facility] Dermatology ([facility])    [address] [place] [id number] [phone]            Feb 21, 2018 11:00 AM CST   (Arrive by 10:45 AM)   Urodynamics with Gill Melendez PA-C   [facility]rology and [facility] for Prostate and Urologic Cancers ([facility])    [address] [place] [id number] [phone]              Who to contact     Please call your clinic at [phone] to:    Ask questions about your health    Make or cancel appointments    Discuss your  "medicines    Learn about your test results    Speak to your doctor   If you have compliments or concerns about an experience at your clinic, or if you wish to file a complaint, please contact [facility] Patient Relations at [phone] or email us at [email or web address]         Additional Information About Your Visit        MyChart Information     [employer] gives you secure access to your electronic health record. If you see a primary care provider, you can also send messages to your care team and make appointments. If you have questions, please call your primary care clinic.  If you do not have a primary care provider, please call [phone] and they will assist you.      Person[employer] is an electronic gateway that provides easy, online access to your medical records. With [employer], you can request a clinic appointment, read your test results, renew a prescription or communicate with your care team.     To access your existing account, please contact your [facility] or call [phone] for assistance.        Care EveryWhere ID     This is your Care EveryWhere ID. This could be used by other organizations to access your [place] medical records  [id number]        Your Vitals Were     Height BMI (Body Mass Index)                1.78 m (5' 10.08\") 24.62 kg/m2           Blood Pressure from Last 3 Encounters:   12/15/17 141/79   06/09/17 115/64   04/07/17 123/70    Weight from Last 3 Encounters:   12/22/17 78 kg (172 lb)   12/15/17 78 kg (172 lb)   06/09/17 78 kg (172 lb)              We Performed the Following     AUDIO REVIEW/CONSULT        Primary Care Provider Office Phone # Fax #    Nadine Orozco [phone]        [address] [address]        Equal Access to Services     MIGUEL JAUREGUI AH: Osiel Bailey, rosalind romero, jennifer talbert " laasmita patrick. So [facility] [phone].    ATENCIÓN: Si kathyla jakub, tiene a vargas disposición servicios gratuitos de asistencia lingüística. Dalton ricardo [phone].    We comply with applicable federal civil rights laws and [place] laws. We do not discriminate on the basis of race, color, national origin, age, disability, sex, sexual orientation, or gender identity.            Thank you!     Thank you for choosing [facility] EAR NOSE AND THROAT  for your care. Our goal is always to provide you with excellent care. Hearing back from our patients is one way we can continue to improve our services. Please take a few minutes to complete the written survey that you may receive in the mail after your visit with us. Thank you!             Your Updated Medication List - Protect others around you: Learn how to safely use, store and throw away your medicines at www.disposemymeds.org.          This list is accurate as of: 12/22/17  9:11 AM.  Always use your most recent med list.                   Brand Name Dispense Instructions for use Diagnosis    alfuzosin 10 MG 24 hr tablet    UROXATRAL    90 tablet    TAKE ONE TABLET BY MOUTH ONCE DAILY    Benign non-nodular prostatic hyperplasia with lower urinary tract symptoms       aspirin 81 MG tablet      Take 81 mg by mouth daily        B-12 1000 MCG Caps      Take 1 capsule by mouth daily        carBAMazepine 200 MG tablet    EPITOL    180 tablet    Take 1 tablet (200 mg) by mouth 2 times daily    Trigeminal neuralgia       eszopiclone 2 MG tablet    LUNESTA    15 tablet    Take 1 tablet (2 mg) by mouth nightly as needed for sleep    Persistent insomnia       metoprolol 25 MG 24 hr tablet    TOPROL-XL    45 tablet    TAKE ONE-HALF TABLET BY MOUTH ONCE DAILY    Coronary artery disease involving bypass graft of transplanted heart without angina pectoris       metroNIDAZOLE 0.75 % cream    METROCREAM    45 g    Apply topically 2 times daily    Acne rosacea       nitroGLYcerin 0.4 MG  sublingual tablet    NITROSTAT    25 tablet    Place 1 tablet (0.4 mg) under the tongue every 5 minutes as needed for chest pain    CAD (coronary artery disease), Status post percutaneous transluminal coronary angioplasty       order for DME     1 Package    Equipment being ordered: Orthotics for both feet, need replacement    Fallen arches       simvastatin 40 MG tablet    ZOCOR    90 tablet    Take 1 tablet (40 mg) by mouth At Bedtime    Coronary artery disease involving native coronary artery of native heart without angina pectoris       valACYclovir 1000 mg tablet    VALTREX    6 tablet    Take 1 pill twice a day for 3 days at the first sign of a cold core.    H/O cold sores

## 2017-12-22 NOTE — PROGRESS NOTES
AUDIOLOGY REPORT    SUMMARY: Audiology visit completed. See audiogram for results.      RECOMMENDATIONS: Follow-up with ENT.    Marquita Ag  Licensed Audiologist  [place] License #[id number]

## 2018-01-01 ASSESSMENT — ENCOUNTER SYMPTOMS
HOARSE VOICE: 0
BACK PAIN: 0
SORE THROAT: 0
TROUBLE SWALLOWING: 0
JOINT SWELLING: 0
SINUS CONGESTION: 1
TASTE DISTURBANCE: 0
SINUS PAIN: 0
HEMATURIA: 0
FLANK PAIN: 0
NECK PAIN: 0
NECK MASS: 0
SMELL DISTURBANCE: 0
MYALGIAS: 0
DIFFICULTY URINATING: 1
ARTHRALGIAS: 0
DYSURIA: 1
MUSCLE WEAKNESS: 0
STIFFNESS: 0
MUSCLE CRAMPS: 0

## 2018-01-01 ASSESSMENT — ACTIVITIES OF DAILY LIVING (ADL)
IN_THE_PAST_7_DAYS,_DID_YOU_NEED_HELP_FROM_OTHERS_TO_TAKE_CARE_OF_THINGS_SUCH_AS_LAUNDRY_AND_HOUSEKEEPING,_BANKING,_SHOPPING,_USING_THE_TELEPHONE,_FOOD_PREPARATION,_TRANSPORTATION,_OR_TAKING_YOUR_OWN_MEDICATIONS?: N
IN_THE_PAST_7_DAYS,_DID_YOU_NEED_HELP_FROM_OTHERS_TO_PERFORM_EVERYDAY_ACTIVITIES_SUCH_AS_EATING,_GETTING_DRESSED,_GROOMING,_BATHING,_WALKING,_OR_USING_THE_TOILET: N

## 2018-01-12 ENCOUNTER — OFFICE VISIT (OUTPATIENT)
Dept: INTERNAL MEDICINE | Facility: CLINIC | Age: 78
End: 2018-01-12
Payer: COMMERCIAL

## 2018-01-12 VITALS
HEART RATE: 72 BPM | BODY MASS INDEX: 25 KG/M2 | WEIGHT: 174.6 LBS | DIASTOLIC BLOOD PRESSURE: 69 MMHG | SYSTOLIC BLOOD PRESSURE: 113 MMHG

## 2018-01-12 DIAGNOSIS — I25.10 CORONARY ARTERY DISEASE INVOLVING NATIVE CORONARY ARTERY OF NATIVE HEART WITHOUT ANGINA PECTORIS: ICD-10-CM

## 2018-01-12 DIAGNOSIS — I25.812 CORONARY ARTERY DISEASE INVOLVING BYPASS GRAFT OF TRANSPLANTED HEART WITHOUT ANGINA PECTORIS: ICD-10-CM

## 2018-01-12 DIAGNOSIS — G47.00 PERSISTENT INSOMNIA: ICD-10-CM

## 2018-01-12 RX ORDER — METOPROLOL SUCCINATE 25 MG/1
TABLET, EXTENDED RELEASE ORAL
Qty: 45 TABLET | Refills: 3 | Status: SHIPPED | OUTPATIENT
Start: 2018-01-12 | End: 2019-02-22

## 2018-01-12 RX ORDER — ESZOPICLONE 2 MG/1
2 TABLET, FILM COATED ORAL
Qty: 15 TABLET | Refills: 2 | Status: SHIPPED | OUTPATIENT
Start: 2018-01-12 | End: 2018-09-18

## 2018-01-12 RX ORDER — SIMVASTATIN 40 MG
40 TABLET ORAL AT BEDTIME
Qty: 90 TABLET | Refills: 3 | Status: SHIPPED | OUTPATIENT
Start: 2018-01-12 | End: 2019-04-07

## 2018-01-12 NOTE — MR AVS SNAPSHOT
After Visit Summary   1/12/2018    Jacinto Flannery    MRN: [id number]           Patient Information     Date Of Birth          1940        Visit Information        Provider Department      1/12/2018 10:00 AM Nadine Alonso MD [facility]        Today's Diagnoses     Coronary artery disease involving native coronary artery of native heart without angina pectoris        Persistent insomnia        Coronary artery disease involving bypass graft of transplanted heart without angina pectoris          Care Instructions    Primary Care Center: [phone]     Primary Care Center Medication Refill Request Information:  * Please contact your pharmacy regarding ANY request for medication refills.  ** [facility] Prescription Fax = [phone]  * Please allow 3 business days for routine medication refills.  * Please allow 5 business days for controlled substance medication refills.     Primary Care Center Test Result notification information:  *You will be notified with in 7-10 days of your appointment day regarding the results of your test.  If you are on MyChart you will be notified as soon as the provider has reviewed the results and signed off on them.            Follow-ups after your visit        Follow-up notes from your care team     Return in about 7 months (around 8/12/2018) for Routine Visit, Lab Work.      Your next 10 appointments already scheduled     Feb 05, 2018  2:00 PM CST   (Arrive by 1:45 PM)   Return Visit with Gill Melendez PA-C   [facility] Inst for Prostate and Urologic Cancers ([facility])    [address] [place] [address]  [address] [facility] [address]   [phone]            Feb 06, 2018  9:30 AM CST   (Arrive by 9:15 AM)   Return Visit with Anjelica Quarles MD   [facility] Dermatology ([facility])    [address] [place] [address]  3rd [facility] [address]   [phone]            Feb 21,  2018 11:00 AM CST   (Arrive by 10:45 AM)   Urodynamics with Gill Melendez PA-C   [facility] and [facility] for Prostate and Urologic Cancers ([facility] [place])    [address] [place] [phone] [phone]              Who to contact     Please call your clinic at [phone] to:    Ask questions about your health    Make or cancel appointments    Discuss your medicines    Learn about your test results    Speak to your doctor   If you have compliments or concerns about an experience at your clinic, or if you wish to file a complaint, please contact [facility] Patient Relations at [phone] or email us at [email or web address]         Additional Information About Your Visit        Eye-QharGIDEEN Information     Petsy gives you secure access to your electronic health record. If you see a primary care provider, you can also send messages to your care team and make appointments. If you have questions, please call your primary care clinic.  If you do not have a primary care provider, please call [phone] and they will assist you.      Petsy is an electronic gateway that provides easy, online access to your medical records. With [employer], you can request a clinic appointment, read your test results, renew a prescription or communicate with your care team.     To access your existing account, please contact your [facility] Clinic or call [phone] for assistance.        Care EveryWhere ID     This is your Care EveryWhere ID. This could be used by other organizations to access your [place] medical records  [id number]        Your Vitals Were     Pulse BMI (Body Mass Index)                72 25 kg/m2           Blood Pressure from Last 3 Encounters:   01/12/18 113/69   12/15/17 141/79   06/09/17 115/64    Weight from Last 3 Encounters:   01/12/18 79.2 kg (174 lb 9.6 oz)   12/22/17 78 kg (172 lb)    12/15/17 78 kg (172 lb)              Today, you had the following     No orders found for display         Where to get your medicines      These medications were sent to [facility] [address] [place] [address] - [address] [facility]  [address] [facility], [facility] [address]     Phone:  [phone]     metoprolol succinate 25 MG 24 hr tablet    simvastatin 40 MG tablet         Some of these will need a paper prescription and others can be bought over the counter.  Ask your nurse if you have questions.     Bring a paper prescription for each of these medications     eszopiclone 2 MG tablet          Primary Care Provider Office Phone # Fax #    Nadine Orozco -[phone] [address] [address]        Equal Access to Services     MIGUEL JAUREGUI : Hadii shaggy maryo Sokaren, waaxda luqadaha, qaybta kaalmada adeegyada, jennifer anaya . So [facility] [phone].    ATENCIÓN: Si habla español, tiene a vargas disposición servicios gratuitos de asistencia lingüística. [facility] [phone].    We comply with applicable federal civil rights laws and [place] laws. We do not discriminate on the basis of race, color, national origin, age, disability, sex, sexual orientation, or gender identity.            Thank you!     Thank you for choosing [facility] PRIMARY CARE CLINIC  for your care. Our goal is always to provide you with excellent care. Hearing back from our patients is one way we can continue to improve our services. Please take a few minutes to complete the written survey that you may receive in the mail after your visit with us. Thank you!             Your Updated Medication List - Protect others around you: Learn how to safely use, store and throw away your medicines at www.disposemymeds.org.          This list is accurate as of: 1/12/18 10:25 AM.  Always use your most recent med list.                   Brand Name Dispense Instructions for use  Diagnosis    alfuzosin 10 MG 24 hr tablet    UROXATRAL    90 tablet    TAKE ONE TABLET BY MOUTH ONCE DAILY    Benign non-nodular prostatic hyperplasia with lower urinary tract symptoms       aspirin 81 MG tablet      Take 81 mg by mouth daily        B-12 1000 MCG Caps      Take 1 capsule by mouth daily        carBAMazepine 200 MG tablet    EPITOL    180 tablet    Take 1 tablet (200 mg) by mouth 2 times daily    Trigeminal neuralgia       eszopiclone 2 MG tablet    LUNESTA    15 tablet    Take 1 tablet (2 mg) by mouth nightly as needed for sleep    Persistent insomnia       metoprolol succinate 25 MG 24 hr tablet    TOPROL-XL    45 tablet    TAKE ONE-HALF TABLET BY MOUTH ONCE DAILY    Coronary artery disease involving bypass graft of transplanted heart without angina pectoris       metroNIDAZOLE 0.75 % cream    METROCREAM    45 g    Apply topically 2 times daily    Acne rosacea       nitroGLYcerin 0.4 MG sublingual tablet    NITROSTAT    25 tablet    Place 1 tablet (0.4 mg) under the tongue every 5 minutes as needed for chest pain    CAD (coronary artery disease), Status post percutaneous transluminal coronary angioplasty       order for DME     1 Package    Equipment being ordered: Orthotics for both feet, need replacement    Fallen arches       simvastatin 40 MG tablet    ZOCOR    90 tablet    Take 1 tablet (40 mg) by mouth At Bedtime    Coronary artery disease involving native coronary artery of native heart without angina pectoris       valACYclovir 1000 mg tablet    VALTREX    6 tablet    Take 1 pill twice a day for 3 days at the first sign of a cold core.    H/O cold sores

## 2018-01-12 NOTE — PATIENT INSTRUCTIONS
[facility]: [phone]     [facility] Medication Refill Request Information:  * Please contact your pharmacy regarding ANY request for medication refills.  ** [facility] Prescription Fax = [phone]  * Please allow 3 business days for routine medication refills.  * Please allow 5 business days for controlled substance medication refills.     [facility] Test Result notification information:  *You will be notified with in 7-10 days of your appointment day regarding the results of your test.  If you are on MyChart you will be notified as soon as the provider has reviewed the results and signed off on them.

## 2018-01-12 NOTE — NURSING NOTE
Chief Complaint   Patient presents with     Physical     Patient here for physical.       Laura uLjan CMA at 9:45 AM on 1/12/2018.

## 2018-01-18 NOTE — PROGRESS NOTES
Jacinto was seen today for physical.  He is a 77 year old male with a past medical history of BPH (benign prostatic hyperplasia); CAD (coronary artery disease); Conductive hearing loss (Unknown.); GERD (gastroesophageal reflux disease); Hearing problem (Unknown); HSV-2 (herpes simplex virus 2) infection (09/2014); Hyperlipidaemia LDL goal < 70; Hypertension; Insomnia; NSTEMI (non-ST elevated myocardial infarction) (H) (11/15/2014); Reduced vision (Mid-adult years); S/P coronary artery stent placement (11/12/14); Sensorineural hearing loss (Unknown.); Skin disease; and Squamous cell carcinoma.     He is actually doing very well and has no major concerns.  He started taking a very small dose of melatonin, 1 mg, prior to bedtime every night and is sleeping much better.  He only needs a lunesta very rarely, on occasion, but he would like to have the Rx filled today just in case.  He also has urinary symptoms of frequency, urgency that have been present for quite some time.  Is going to follow up with Urology in a couple months.    Past, family, social history unchanged per [facility].   ROS: 10 point ROS neg other than the symptoms noted above in the HPI.    PHYSICAL EXAM:  B/P: 113/69, P: 72  Constitutional: no distress, comfortable, pleasant   Eyes: anicteric, normal extra-ocular movements   Ears, Nose and Throat: tympanic membranes clear, nose clear and free of lesions, throat clear, neck supple with full range of motion, no thyromegaly.   Cardiovascular: regular rate and rhythm, normal S1 and S2, no murmurs, rubs or gallops,  peripheral pulses full and symmetric   Respiratory: clear to auscultation, no wheezes or crackles, normal breath sounds   Gastrointestinal: positive bowel sounds, nontender, no hepatosplenomegaly, no masses   Musculoskeletal: full range of motion, no edema   Skin: no concerning lesions, no jaundice   Neurological: cranial nerves intact, normal strength and sensation, reflexes at patella and biceps  normal, normal gait, no tremor   Psychological: appropriate mood   Lymphatic: no cervical lymphadenopathy and no pedal edema      A/P 77 year old here for routine physical and med refills    Coronary artery disease involving native coronary artery of native heart without angina pectoris  -     simvastatin (ZOCOR) 40 MG tablet; Take 1 tablet (40 mg) by mouth At Bedtime    Persistent insomnia  -     eszopiclone (LUNESTA) 2 MG tablet; Take 1 tablet (2 mg) by mouth nightly as needed for sleep    Coronary artery disease involving bypass graft of transplanted heart without angina pectoris  -     metoprolol succinate (TOPROL-XL) 25 MG 24 hr tablet; TAKE ONE-HALF TABLET BY MOUTH ONCE DAILY    RTC one year for physical, 6 months for routine follow up or sooner prn    -- Nadine Aponte MD

## 2018-01-23 ENCOUNTER — PRE VISIT (OUTPATIENT)
Dept: UROLOGY | Facility: CLINIC | Age: 78
End: 2018-01-23

## 2018-02-05 ENCOUNTER — OFFICE VISIT (OUTPATIENT)
Dept: UROLOGY | Facility: CLINIC | Age: 78
End: 2018-02-05
Payer: COMMERCIAL

## 2018-02-05 VITALS
DIASTOLIC BLOOD PRESSURE: 71 MMHG | HEIGHT: 70 IN | BODY MASS INDEX: 24.34 KG/M2 | HEART RATE: 67 BPM | WEIGHT: 170 LBS | SYSTOLIC BLOOD PRESSURE: 125 MMHG

## 2018-02-05 DIAGNOSIS — R33.9 URINARY RETENTION: Primary | ICD-10-CM

## 2018-02-05 DIAGNOSIS — N40.1 BENIGN NON-NODULAR PROSTATIC HYPERPLASIA WITH LOWER URINARY TRACT SYMPTOMS: ICD-10-CM

## 2018-02-05 ASSESSMENT — PAIN SCALES - GENERAL: PAINLEVEL: NO PAIN (0)

## 2018-02-05 NOTE — LETTER
"2/5/2018       RE: Jacinto Flannery  PO   [facility] [address]     Dear Colleague,    Thank you for referring your patient, Jacinto Flannery, to the [facility] UROLOGY AND [facility] FOR PROSTATE AND UROLOGIC CANCERS at [facility]. Please see a copy of my visit note below.    UROLOGY OFFICE VISIT - FOLLOW UP    HISTORY:   77 year old male with a history of AUR s/p PVP complicated by hematuria. He is a patient of Dr. Porter. At last office with her on 12/15/17, he was noted to be in urinary retention once again (PVR of 327 mL despite multiple attempts to void). Plan was to instruct him in CIC but several nurses were unable to pass a catheter into his bladder. He was therefore evaluated cystoscopically by Dr. Porter which revealed a normal urethra, TUR defect with some mild regrowth, but no evidence of BNC or cause for difficult catheter insertion. He was referred for urodynamics studies (scheduled 2/21/18) but presents today with some concerns related to this upcoming procedure.     1. He is worried about the amount of water he was instructed to drink prior to the test.  2. He is worried about the possibility of not being able to be catheterized like what happened at his recent 12/15/17 office visit.      PHYSICAL EXAM:  /71  Pulse 67  Ht 1.778 m (5' 10\")  Wt 77.1 kg (170 lb)  BMI 24.39 kg/m2  GEN: well-appearing male in NAD  RESP: no increased respiratory effort      IMPRESSION/PLAN:  78 y/o M with a history of AUR s/p PVP with several issues post op, now with rising PVRs. Patient of Dr. Porter who was referred for UDS - scheduled on 2/21/18, who presented today to discuss some additional questions prior to his UDS procedure.  -Discussed that he should drink normally the morning of his UDS appointment. Come to the appointment with a comfortably full bladder for the pre-study uroflow test.  -Discussed that we will do our best to place the Pves catheter on the day of the procedure, " but that there is no guarantee we will not run into the same issue as the nurses from his 12/15/17 appointment (they were unable to insert any size catheter into his bladder, although the cystoscope did pass without any difficulty). If we are unable to place the Pves catheter on the day of UDS, discussed that we may have to cancel the procedure, but that we would make every effort to avoid this outcome.    Patient verbalized understanding and had his questions answered satisfactorily.    Follow up for UDS as scheduled on 2/21/8.     Total Time: 20 minutes                                         Total in Consultation: greater than 50%, discussing upcoming UDS procedure.      Again, thank you for allowing me to participate in the care of your patient.      Sincerely,    Gill Melendez PA-C

## 2018-02-05 NOTE — PROGRESS NOTES
"UROLOGY OFFICE VISIT - FOLLOW UP    HISTORY:   77 year old male with a history of AUR s/p PVP complicated by hematuria. He is a patient of Dr. Porter. At last office with her on 12/15/17, he was noted to be in urinary retention once again (PVR of 327 mL despite multiple attempts to void). Plan was to instruct him in CIC but several nurses were unable to pass a catheter into his bladder. He was therefore evaluated cystoscopically by Dr. Porter which revealed a normal urethra, TUR defect with some mild regrowth, but no evidence of BNC or cause for difficult catheter insertion. He was referred for urodynamics studies (scheduled 2/21/18) but presents today with some concerns related to this upcoming procedure.     1. He is worried about the amount of water he was instructed to drink prior to the test.  2. He is worried about the possibility of not being able to be catheterized like what happened at his recent 12/15/17 office visit.      PHYSICAL EXAM:  /71  Pulse 67  Ht 1.778 m (5' 10\")  Wt 77.1 kg (170 lb)  BMI 24.39 kg/m2  GEN: well-appearing male in NAD  RESP: no increased respiratory effort      IMPRESSION/PLAN:  78 y/o M with a history of AUR s/p PVP with several issues post op, now with rising PVRs. Patient of Dr. Porter who was referred for UDS - scheduled on 2/21/18, who presented today to discuss some additional questions prior to his UDS procedure.  -Discussed that he should drink normally the morning of his UDS appointment. Come to the appointment with a comfortably full bladder for the pre-study uroflow test.  -Discussed that we will do our best to place the Pves catheter on the day of the procedure, but that there is no guarantee we will not run into the same issue as the nurses from his 12/15/17 appointment (they were unable to insert any size catheter into his bladder, although the cystoscope did pass without any difficulty). If we are unable to place the Pves catheter on the day of UDS, " discussed that we may have to cancel the procedure, but that we would make every effort to avoid this outcome.    Patient verbalized understanding and had his questions answered satisfactorily.    Follow up for UDS as scheduled on 2/21/8.     Total Time: 20 minutes                                         Total in Consultation: greater than 50%, discussing upcoming UDS procedure.    Gill Melendez PA-C  Department of Urology

## 2018-02-05 NOTE — MR AVS SNAPSHOT
After Visit Summary   2/5/2018    Jacinto Flannery    MRN: [id number]           Patient Information     Date Of Birth          1940        Visit Information        Provider Department      2/5/2018 2:00 PM Gill Melendez PA-C [facility] Urology and [facility] for Prostate and Urologic Cancers        Care Instructions    UROLOGY CLINIC VISIT PATIENT INSTRUCTIONS    1) Call to schedule a follow up appointment with Dr. Porter after the urodynamics testing has been done (after 2/21/18).    If you have any issues, questions or concerns in the meantime, do not hesitate to contact us at [phone] or via [place].     It was a pleasure meeting with you today.  Thank you for allowing me and my team the privilege of caring for you today.  YOU are the reason we are here, and I truly hope we provided you with the excellent service you deserve.  Please let us know if there is anything else we can do for you so that we can be sure you are leaving completely satisfied with your care experience.                Follow-ups after your visit        Your next 10 appointments already scheduled     Feb 06, 2018  9:30 AM CST   (Arrive by 9:15 AM)   Return Visit with Anjelica Quarles MD   [facility] Dermatology ([facility])    [address] [place] [address]  3rd [facility] [id number] [phone]            Feb 21, 2018 11:00 AM CST   (Arrive by 10:45 AM)   Urodynamics with Gill Melendez PA-C   [facility] Urology and [facility] for Prostate and Urologic Cancers ([facility])    [address] [place] [address]  4th [facility] [id number] [phone]            Sep 04, 2018 11:00 AM CDT   (Arrive by 10:45 AM)   Return Visit with Nadine Orozco MD   [facility] Primary Care Clinic ([facility])    [address] [place] [id number] [phone]              Who to contact     Please call your clinic at  "[phone] to:    Ask questions about your health    Make or cancel appointments    Discuss your medicines    Learn about your test results    Speak to your doctor   If you have compliments or concerns about an experience at your clinic, or if you wish to file a complaint, please contact [facility] Patient Relations at [phone] or email us at [email or web address]         Additional Information About Your Visit        [employer] Information     [employer] gives you secure access to your electronic health record. If you see a primary care provider, you can also send messages to your care team and make appointments. If you have questions, please call your primary care clinic.  If you do not have a primary care provider, please call [phone] and they will assist you.      [employer] is an electronic gateway that provides easy, online access to your medical records. With [employer], you can request a clinic appointment, read your test results, renew a prescription or communicate with your care team.     To access your existing account, please contact your [facility] Clinic or call [phone] for assistance.        Care EveryWhere ID     This is your Care EveryWhere ID. This could be used by other organizations to access your [place] medical records  [id number]        Your Vitals Were     Pulse Height BMI (Body Mass Index)             67 1.778 m (5' 10\") 24.39 kg/m2          Blood Pressure from Last 3 Encounters:   02/05/18 125/71   01/12/18 113/69   12/15/17 141/79    Weight from Last 3 Encounters:   02/05/18 77.1 kg (170 lb)   01/12/18 79.2 kg (174 lb 9.6 oz)   12/22/17 78 kg (172 lb)              Today, you had the following     No orders found for display       Primary Care Provider Office Phone # Fax #    Nadine Orozco [phone]       [address] [address]        Equal Access to Services     MIGUEL " GAAR : Hadii aad ku krishna [place], waaxda luqadaha, qaybta kagoldieda joshua, waxdiana tacho hayjoycelyn highjose luispacheco anaya . So [facility] [phone].    ATENCIÓN: Si habla español, tiene a vargas disposición servicios gratuitos de asistencia lingüística. Llame al [phone].    We comply with applicable federal civil rights laws and [place] laws. We do not discriminate on the basis of race, color, national origin, age, disability, sex, sexual orientation, or gender identity.            Thank you!     Thank you for choosing [facility] UROLOGY AND [facility] FOR PROSTATE AND UROLOGIC CANCERS  for your care. Our goal is always to provide you with excellent care. Hearing back from our patients is one way we can continue to improve our services. Please take a few minutes to complete the written survey that you may receive in the mail after your visit with us. Thank you!             Your Updated Medication List - Protect others around you: Learn how to safely use, store and throw away your medicines at www.disposemymeds.org.          This list is accurate as of 2/5/18  2:05 PM.  Always use your most recent med list.                   Brand Name Dispense Instructions for use Diagnosis    alfuzosin 10 MG 24 hr tablet    UROXATRAL    90 tablet    TAKE ONE TABLET BY MOUTH ONCE DAILY    Benign non-nodular prostatic hyperplasia with lower urinary tract symptoms       aspirin 81 MG tablet      Take 81 mg by mouth daily        B-12 1000 MCG Caps      Take 1 capsule by mouth daily        carBAMazepine 200 MG tablet    EPITOL    180 tablet    Take 1 tablet (200 mg) by mouth 2 times daily    Trigeminal neuralgia       eszopiclone 2 MG tablet    LUNESTA    15 tablet    Take 1 tablet (2 mg) by mouth nightly as needed for sleep    Persistent insomnia       metoprolol succinate 25 MG 24 hr tablet    TOPROL-XL    45 tablet    TAKE ONE-HALF TABLET BY MOUTH ONCE DAILY    Coronary artery disease involving bypass graft of transplanted heart without  angina pectoris       metroNIDAZOLE 0.75 % cream    METROCREAM    45 g    Apply topically 2 times daily    Acne rosacea       nitroGLYcerin 0.4 MG sublingual tablet    NITROSTAT    25 tablet    Place 1 tablet (0.4 mg) under the tongue every 5 minutes as needed for chest pain    CAD (coronary artery disease), Status post percutaneous transluminal coronary angioplasty       order for DME     1 Package    Equipment being ordered: Orthotics for both feet, need replacement    Fallen arches       simvastatin 40 MG tablet    ZOCOR    90 tablet    Take 1 tablet (40 mg) by mouth At Bedtime    Coronary artery disease involving native coronary artery of native heart without angina pectoris       valACYclovir 1000 mg tablet    VALTREX    6 tablet    Take 1 pill twice a day for 3 days at the first sign of a cold core.    H/O cold sores

## 2018-02-05 NOTE — PATIENT INSTRUCTIONS
UROLOGY CLINIC VISIT PATIENT INSTRUCTIONS    1) Call to schedule a follow up appointment with Dr. Porter after the urodynamics testing has been done (after 2/21/18).    If you have any issues, questions or concerns in the meantime, do not hesitate to contact us at [phone] or via [employer].     It was a pleasure meeting with you today.  Thank you for allowing me and my team the privilege of caring for you today.  YOU are the reason we are here, and I truly hope we provided you with the excellent service you deserve.  Please let us know if there is anything else we can do for you so that we can be sure you are leaving completely satisfied with your care experience.

## 2018-02-06 ENCOUNTER — OFFICE VISIT (OUTPATIENT)
Dept: DERMATOLOGY | Facility: CLINIC | Age: 78
End: 2018-02-06
Payer: COMMERCIAL

## 2018-02-06 DIAGNOSIS — L82.1 SK (SEBORRHEIC KERATOSIS): ICD-10-CM

## 2018-02-06 DIAGNOSIS — L71.9 ACNE ROSACEA: Primary | ICD-10-CM

## 2018-02-06 ASSESSMENT — PAIN SCALES - GENERAL: PAINLEVEL: NO PAIN (0)

## 2018-02-06 NOTE — NURSING NOTE
Dermatology Rooming Note    Jacinto Flannery's goals for this visit include:   Chief Complaint   Patient presents with     Derm Problem     Jacinto is here for a skin check, states he has no areas of concern today.        Ghada Ibrahim LPN
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
## 2018-02-06 NOTE — MR AVS SNAPSHOT
After Visit Summary   2/6/2018    Jacinto Flannery    MRN: [id number]           Patient Information     Date Of Birth          1940        Visit Information        Provider Department      2/6/2018 9:30 AM Anjelica Quarles MD [facility] Dermatology        Care Instructions    You can continue using the Metronidazole cream once daily or twice daily if the rash recurs.     Follow-up in 6 months - you should receive a call in 3 months to schedule an appointment.           Follow-ups after your visit        Your next 10 appointments already scheduled     Feb 21, 2018 11:00 AM CST   (Arrive by 10:45 AM)   Urodynamics with Gill Melendez PA-C   [facility] and [facility]rolog[facility] ([facility])    [address] [place] [id number] [phone]            Apr 13, 2018  2:45 PM CDT   (Arrive by 2:30 PM)   Return Visit with Natalie Porter MD   [facility] and [facility] ([facility])    [address] [place] [id number] [phone]            Sep 04, 2018 11:00 AM CDT   (Arrive by 10:45 AM)   Return Visit with Nadine Orozco MD   [facility] ([facility])    [address] [place] [id number] [phone]              Who to contact     Please call your clinic at [phone] to:    Ask questions about your health    Make or cancel appointments    Discuss your medicines    Learn about your test results    Speak to your doctor   If you have compliments or concerns about an experience at your clinic, or if you wish to file a complaint, please contact [facility] Patient Relations at [phone] or email us at [email or web address]         Additional Information About Your Visit        MyChart Information     MyChart gives  you secure access to your electronic health record. If you see a primary care provider, you can also send messages to your care team and make appointments. If you have questions, please call your primary care clinic.  If you do not have a primary care provider, please call [phone] and they will assist you.      [employer] is an electronic gateway that provides easy, online access to your medical records. With [employer], you can request a clinic appointment, read your test results, renew a prescription or communicate with your care team.     To access your existing account, please contact your [facility] or call [phone] for assistance.        Care EveryWhere ID     This is your Care EveryWhere ID. This could be used by other organizations to access your [place] medical records  [id number]         Blood Pressure from Last 3 Encounters:   02/05/18 125/71   01/12/18 113/69   12/15/17 141/79    Weight from Last 3 Encounters:   02/05/18 77.1 kg (170 lb)   01/12/18 79.2 kg (174 lb 9.6 oz)   12/22/17 78 kg (172 lb)              Today, you had the following     No orders found for display       Primary Care Provider Office Phone # Fax #    Nadine Isabel Orozco [phone]       [address] [address]        Equal Access to Services     MIGUEL JAUREGUI : Hadii aad ku hadasho Soomaali, waaxda luqadaha, qaybta kaalmada adeegyada, jennifer patrick. So [facility] [phone].    ATENCIÓN: Si habla español, tiene a vargas disposición servicios gratuitos de asistencia lingüística. Llame al [phone].    We comply with applicable federal civil rights laws and [place] laws. We do not discriminate on the basis of race, color, national origin, age, disability, sex, sexual orientation, or gender identity.            Thank you!     Thank you for choosing [facility]  for your care. Our goal is always to provide you with  excellent care. Hearing back from our patients is one way we can continue to improve our services. Please take a few minutes to complete the written survey that you may receive in the mail after your visit with us. Thank you!             Your Updated Medication List - Protect others around you: Learn how to safely use, store and throw away your medicines at www.disposemymeds.org.          This list is accurate as of 2/6/18  9:43 AM.  Always use your most recent med list.                   Brand Name Dispense Instructions for use Diagnosis    alfuzosin 10 MG 24 hr tablet    UROXATRAL    90 tablet    TAKE ONE TABLET BY MOUTH ONCE DAILY    Benign non-nodular prostatic hyperplasia with lower urinary tract symptoms       aspirin 81 MG tablet      Take 81 mg by mouth daily        B-12 1000 MCG Caps      Take 1 capsule by mouth daily        carBAMazepine 200 MG tablet    EPITOL    180 tablet    Take 1 tablet (200 mg) by mouth 2 times daily    Trigeminal neuralgia       eszopiclone 2 MG tablet    LUNESTA    15 tablet    Take 1 tablet (2 mg) by mouth nightly as needed for sleep    Persistent insomnia       metoprolol succinate 25 MG 24 hr tablet    TOPROL-XL    45 tablet    TAKE ONE-HALF TABLET BY MOUTH ONCE DAILY    Coronary artery disease involving bypass graft of transplanted heart without angina pectoris       metroNIDAZOLE 0.75 % cream    METROCREAM    45 g    Apply topically 2 times daily    Acne rosacea       nitroGLYcerin 0.4 MG sublingual tablet    NITROSTAT    25 tablet    Place 1 tablet (0.4 mg) under the tongue every 5 minutes as needed for chest pain    CAD (coronary artery disease), Status post percutaneous transluminal coronary angioplasty       order for DME     1 Package    Equipment being ordered: Orthotics for both feet, need replacement    Fallen arches       simvastatin 40 MG tablet    ZOCOR    90 tablet    Take 1 tablet (40 mg) by mouth At Bedtime    Coronary artery disease involving native coronary  artery of native heart without angina pectoris       valACYclovir 1000 mg tablet    VALTREX    6 tablet    Take 1 pill twice a day for 3 days at the first sign of a cold core.    H/O cold sores

## 2018-02-06 NOTE — PATIENT INSTRUCTIONS
You can continue using the Metronidazole cream once daily or twice daily if the rash recurs.     Follow-up in 6 months - you should receive a call in 3 months to schedule an appointment.
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
## 2018-02-21 ENCOUNTER — OFFICE VISIT (OUTPATIENT)
Dept: UROLOGY | Facility: CLINIC | Age: 78
End: 2018-02-21
Payer: COMMERCIAL

## 2018-02-21 VITALS
DIASTOLIC BLOOD PRESSURE: 78 MMHG | HEART RATE: 97 BPM | HEIGHT: 70 IN | BODY MASS INDEX: 25.08 KG/M2 | SYSTOLIC BLOOD PRESSURE: 129 MMHG | WEIGHT: 175.2 LBS

## 2018-02-21 DIAGNOSIS — R33.9 URINARY RETENTION: Primary | ICD-10-CM

## 2018-02-21 ASSESSMENT — PAIN SCALES - GENERAL: PAINLEVEL: NO PAIN (0)

## 2018-02-21 NOTE — LETTER
2/21/2018       RE: Jacinto Flannery  PO   [facility] [address]     Dear Colleague,    Thank you for referring your patient, Jacinto Flannery, to the [facility] UROLOGY AND INST FOR PROSTATE AND UROLOGIC CANCERS at [facility]. Please see a copy of my visit note below.    PREPROCEDURE DIAGNOSES:    1. Urinary retention s/p PVP    POSTPROCEDURE DIAGNOSES:  1. Inability to place any catheters during today's procedure.     PROCEDURE:    1. Uroflowmetry.  2. Attempted sterile urethral catheterization for measurement of postvoid residual urine volume.    INDICATIONS FOR PROCEDURE:  Mr. Jacinto Flannery is a pleasant 77 year old male with urinary retention with incomplete bladder emptying following PVP complicated by hematuria. Baseline video urodynamic assessment is requested today by Dr. Porter to better characterize Mr. Jacinto Flannery's voiding dysfunction.      VOIDING DIARY:  Voids q3-4 hours during the day, nocturia x 1-2.  Incontinence: occasional post-void dribbling  Volume intake: 2130 mL; mainly water with occasional juice or milk  Volume output: 2630 mL; average volume voided: 375 mL; largest volume voided: 680 mL    PRE-STUDY UROFLOWMETRY:  Voided volume: 77 mL.  Maximum flow rate: 6.3 mL/sec.  Average flow rate: 2.9 mL/sec.  Character of the curve: blunted.  Postvoid residual by bladder scan: 150 mL.    Next, we attempted to introduce an 8 Fr Coude catheter into the bladder but were unsuccessful. Resistance was met at the level of the bladder neck. We then tried with a 16 Fr Coude catheter but were still unsuccessful at passing the catheter through the bladder neck. Multiple attempts were made by both nursing staff and myself with no success. At this point, the patient was in quite a bit of discomfort and we started to see return of a few drops of bright red blood.    -Patient verbalized desire to terminate the study at this point. He reports not being bothered by his current  "retention/incomplete bladder emptying symptoms and does not want to \"muck things up\" by continuing to \"attempt to force a catheter into his bladder\" today.   -He would rather visit with Dr. Porter again in the clinic to discuss whether UDS is absolutely necessary or whether he may just continue to observe his symptoms at this point.  -After discussion, we mutually agreed to terminate today's UDS procedure given inability to place a catheter.   -Patient will call to schedule follow up with Dr. Porter to further discuss and make plans for how best to proceed.      - A single Cipro antibiotic was provided for UTI prophylaxis given multiple attempts at urethral catheterization.     Thank you for allowing me to participate in the care of Mr. Jacinto Flannery and please don't hesitate to contact me with any questions or concerns.        Again, thank you for allowing me to participate in the care of your patient.      Sincerely,    Gill Melendez PA-C      "

## 2018-02-21 NOTE — MR AVS SNAPSHOT
After Visit Summary   2/21/2018    Jacinto Flannery    MRN: [id number]           Patient Information     Date Of Birth          1940        Visit Information        Provider Department      2/21/2018 11:00 AM Gill Melendez PA-C [facility]rology and [facility] for Prostate and Urologic Cancers        Today's Diagnoses     Urinary retention    -  1       Follow-ups after your visit        Your next 10 appointments already scheduled     Apr 13, 2018  2:45 PM CDT   (Arrive by 2:30 PM)   Return Visit with Natalie Porter MD   [facility] and [facility] Urologic [facility] ([facility])    [address] [place] [id number] [phone]            Sep 04, 2018 11:00 AM CDT   (Arrive by 10:45 AM)   Return Visit with Nadine Orozco MD   [facility] ([facility])    [address] [place] [id number] [phone]              Who to contact     Please call your clinic at [phone] to:    Ask questions about your health    Make or cancel appointments    Discuss your medicines    Learn about your test results    Speak to your doctor            Additional Information About Your Visit        [employer] Information     [employer] gives you secure access to your electronic health record. If you see a primary care provider, you can also send messages to your care team and make appointments. If you have questions, please call your primary care clinic.  If you do not have a primary care provider, please call [phone] and they will assist you.      [employer] is an electronic gateway that provides easy, online access to your medical records. With [employer], you can request a clinic appointment, read your test results, renew a prescription or communicate with your care team.     To access your existing account, please contact your [facility] or  "call [phone] for assistance.        Care EveryWhere ID     This is your Care EveryWhere ID. This could be used by other organizations to access your [place] medical records  [id number]        Your Vitals Were     Pulse Height BMI (Body Mass Index)             97 1.778 m (5' 10\") 25.14 kg/m2          Blood Pressure from Last 3 Encounters:   02/21/18 129/78   02/05/18 125/71   01/12/18 113/69    Weight from Last 3 Encounters:   02/21/18 79.5 kg (175 lb 3.2 oz)   02/05/18 77.1 kg (170 lb)   01/12/18 79.2 kg (174 lb 9.6 oz)              Today, you had the following     No orders found for display       Primary Care Provider Office Phone # Fax #    Nadine Isabel Orozco [phone]       [address] [address]        Equal Access to Services     [facility] CenterBREANNA : Hadii aad ku hadasho Soomaali, waaxda luqadaha, qaybta kaalmada adeegyada, waxay idiin haycaspern lakesha anaya . So [facility] [phone].    ATENCIÓN: Si christie call, tiene a vargas disposición servicios gratuitos de asistencia lingüística. Llame al [phone].    We comply with applicable federal civil rights laws and [place] laws. We do not discriminate on the basis of race, color, national origin, age, disability, sex, sexual orientation, or gender identity.            Thank you!     Thank you for choosing [facility] UROLOGY AND [facility] FOR PROSTATE AND UROLOGIC CANCERS  for your care. Our goal is always to provide you with excellent care. Hearing back from our patients is one way we can continue to improve our services. Please take a few minutes to complete the written survey that you may receive in the mail after your visit with us. Thank you!             Your Updated Medication List - Protect others around you: Learn how to safely use, store and throw away your medicines at www.disposemymeds.org.          This list is accurate as of 2/21/18 12:06 PM.  Always use your most recent med list.                   " Brand Name Dispense Instructions for use Diagnosis    alfuzosin 10 MG 24 hr tablet    UROXATRAL    90 tablet    TAKE ONE TABLET BY MOUTH ONCE DAILY    Benign non-nodular prostatic hyperplasia with lower urinary tract symptoms       aspirin 81 MG tablet      Take 81 mg by mouth daily        B-12 1000 MCG Caps      Take 1 capsule by mouth daily        carBAMazepine 200 MG tablet    EPITOL    180 tablet    Take 1 tablet (200 mg) by mouth 2 times daily    Trigeminal neuralgia       eszopiclone 2 MG tablet    LUNESTA    15 tablet    Take 1 tablet (2 mg) by mouth nightly as needed for sleep    Persistent insomnia       metoprolol succinate 25 MG 24 hr tablet    TOPROL-XL    45 tablet    TAKE ONE-HALF TABLET BY MOUTH ONCE DAILY    Coronary artery disease involving bypass graft of transplanted heart without angina pectoris       metroNIDAZOLE 0.75 % cream    METROCREAM    45 g    Apply topically 2 times daily    Acne rosacea       nitroGLYcerin 0.4 MG sublingual tablet    NITROSTAT    25 tablet    Place 1 tablet (0.4 mg) under the tongue every 5 minutes as needed for chest pain    CAD (coronary artery disease), Status post percutaneous transluminal coronary angioplasty       order for DME     1 Package    Equipment being ordered: Orthotics for both feet, need replacement    Fallen arches       simvastatin 40 MG tablet    ZOCOR    90 tablet    Take 1 tablet (40 mg) by mouth At Bedtime    Coronary artery disease involving native coronary artery of native heart without angina pectoris       valACYclovir 1000 mg tablet    VALTREX    6 tablet    Take 1 pill twice a day for 3 days at the first sign of a cold core.    H/O cold sores

## 2018-02-21 NOTE — PROGRESS NOTES
"PREPROCEDURE DIAGNOSES:    1. Urinary retention s/p PVP    POSTPROCEDURE DIAGNOSES:  1. Inability to place any catheters during today's procedure.     PROCEDURE:    1. Uroflowmetry.  2. Attempted sterile urethral catheterization for measurement of postvoid residual urine volume.    INDICATIONS FOR PROCEDURE:  Mr. Jacinto Flannery is a pleasant 77 year old male with urinary retention with incomplete bladder emptying following PVP complicated by hematuria. Baseline video urodynamic assessment is requested today by Dr. Porter to better characterize Mr. Jacinto Flannery's voiding dysfunction.      VOIDING DIARY:  Voids q3-4 hours during the day, nocturia x 1-2.  Incontinence: occasional post-void dribbling  Volume intake: 2130 mL; mainly water with occasional juice or milk  Volume output: 2630 mL; average volume voided: 375 mL; largest volume voided: 680 mL    PRE-STUDY UROFLOWMETRY:  Voided volume: 77 mL.  Maximum flow rate: 6.3 mL/sec.  Average flow rate: 2.9 mL/sec.  Character of the curve: blunted.  Postvoid residual by bladder scan: 150 mL.    Next, we attempted to introduce an 8 Fr Coude catheter into the bladder but were unsuccessful. Resistance was met at the level of the bladder neck. We then tried with a 16 Fr Coude catheter but were still unsuccessful at passing the catheter through the bladder neck. Multiple attempts were made by both nursing staff and myself with no success. At this point, the patient was in quite a bit of discomfort and we started to see return of a few drops of bright red blood.    -Patient verbalized desire to terminate the study at this point. He reports not being bothered by his current retention/incomplete bladder emptying symptoms and does not want to \"muck things up\" by continuing to \"attempt to force a catheter into his bladder\" today.   -He would rather visit with Dr. Porter again in the clinic to discuss whether UDS is absolutely necessary or whether he may just continue to observe his " symptoms at this point.  -After discussion, we mutually agreed to terminate today's UDS procedure given inability to place a catheter.   -Patient will call to schedule follow up with Dr. Porter to further discuss and make plans for how best to proceed.      - A single Cipro antibiotic was provided for UTI prophylaxis given multiple attempts at urethral catheterization.     Thank you for allowing me to participate in the care of Mr. Jacinto Flannery and please don't hesitate to contact me with any questions or concerns.      BRENDA VasquezC  Urology Physician Assistant

## 2018-03-08 ENCOUNTER — OFFICE VISIT (OUTPATIENT)
Dept: UROLOGY | Facility: CLINIC | Age: 78
End: 2018-03-08
Payer: COMMERCIAL

## 2018-03-08 VITALS
HEIGHT: 70 IN | DIASTOLIC BLOOD PRESSURE: 62 MMHG | BODY MASS INDEX: 24.34 KG/M2 | WEIGHT: 170 LBS | SYSTOLIC BLOOD PRESSURE: 112 MMHG | HEART RATE: 80 BPM

## 2018-03-08 DIAGNOSIS — R33.9 URINARY RETENTION: Primary | ICD-10-CM

## 2018-03-08 PROCEDURE — 99213 OFFICE O/P EST LOW 20 MIN: CPT | Performed by: UROLOGY

## 2018-03-08 ASSESSMENT — PAIN SCALES - GENERAL: PAINLEVEL: NO PAIN (0)

## 2018-03-08 NOTE — LETTER
3/8/2018       RE: Jacinto Flannery  [address]   [facility] [address]     Dear Colleague,    Thank you for referring your patient, Jacinto Flannery, to the [facility] [place] at [facility]. Please see a copy of my visit note below.    UROLOGIC DIAGNOSES:       CURRENT INTERVENTIONS:       HISTORY:   Patient with history of AUR s/p PVP complicated by hematuria.       Patient states that compared to five years ago, he is doing great.   Wakes once in the evening. Notes some post void dribbling.   Notes that he does not empty completely, but that this is standard for him.   No further retention or hesitancy.     At last visit, patient had high PVR but cystoscopy revealed open bladder neck and minimal re-growth.     Patient had attempted UDS but no one was able to pass catheter to the bladder.     We discussed UDS findings as well as possible future treatment including CIC, UDS, further monitoring.                PAST MEDICAL HISTORY:   Past Medical History:   Diagnosis Date     BPH (benign prostatic hyperplasia)      CAD (coronary artery disease)     MI 1989, CABG 1990, 11/2014 PCI to LM, LCx and RCA     Conductive hearing loss Unknown.     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     Skin disease      Squamous cell carcinoma        PAST SURGICAL HISTORY:   Past Surgical History:   Procedure Laterality Date     C CABG, VEIN, THREE  1990    SVG-LAD in [place]     CHOLECYSTECTOMY  1992    in [place]     ENDOSCOPY  04/26/2013    Done at [facility] in [place], [place]     GENITOURINARY SURGERY    "    HEART CATH STENT COR W/WO PTCA  11/12/2014    x 4 ANKUR's (two ostial to mid-RCA, one ostial LCx and mid-LM, one ostial LAD)     HERNIA REPAIR Right 2010    in [place]     LASER KTP GREEN LIGHT PHOTOSELECTIVE VAPORIZATION PROSTATE N/A 1/5/2015    Procedure: LASER KTP GREEN LIGHT PHOTOSELECTIVE VAPORIZATION PROSTATE;  Surgeon: Natalie Porter MD;  Location: [facility] OR     MOHS MICROGRAPHIC PROCEDURE         FAMILY HISTORY:   Family History   Problem Relation Age of Onset     CANCER Father      Stomach     HEART DISEASE Mother      HEART DISEASE Maternal Grandmother      Skin Cancer No family hx of        SOCIAL HISTORY:   Social History   Substance Use Topics     Smoking status: Former Smoker     Packs/day: 0.50     Years: 1.00     Types: Cigarettes     Start date: 9/1/1965     Quit date: 5/1/1970     Smokeless tobacco: Never Used     Alcohol use Yes      Comment: rarely - a beer a month       Current Outpatient Prescriptions   Medication     simvastatin (ZOCOR) 40 MG tablet     eszopiclone (LUNESTA) 2 MG tablet     metoprolol succinate (TOPROL-XL) 25 MG 24 hr tablet     nitroGLYcerin (NITROSTAT) 0.4 MG sublingual tablet     carBAMazepine (EPITOL) 200 MG tablet     metroNIDAZOLE (METROCREAM) 0.75 % cream     alfuzosin (UROXATRAL) 10 MG 24 hr tablet     valACYclovir (VALTREX) 1000 mg tablet     order for DME     aspirin 81 MG tablet     Cyanocobalamin (B-12) 1000 MCG CAPS     No current facility-administered medications for this visit.          PHYSICAL EXAM:    /62  Pulse 80  Ht 1.778 m (5' 10\")  Wt 77.1 kg (170 lb)  BMI 24.39 kg/m2    HEENT: Normocephalic and atraumatic   Cardiac: Not done  Back/Flank: Not done  CNS/PNS: Not done  Respiratory: Normal non-labored breathing  Abdomen: Soft nontender and nondistended  Peripheral Vascular: Not done  Mental Status: Not done    Penis: Not done  Scrotal Skin: Not done  Testicles: Not done  Epididymis: Not done  Digital Rectal Exam:     Cystoscopy: "       Imaging: None    Urinalysis: UA RESULTS:  Recent Labs   Lab Test  01/16/15   0053  11/26/14   COLOR  Dark Red   < >  Red   APPEARANCE  Slightly Cloudy   < >  Clear   URINEGLC  Negative   < >  Neg   URINEBILI  Negative   < >  Small   URINEKETONE  5*   < >  Neg   SG  1.005   < >  1.010   UBLD  Large*   < >  Large   URINEPH  6.5   < >  5.0   PROTEIN  100*   < >  Trace   UROBILINOGEN   --    --   0.2   NITRITE  Negative   < >  Pos   LEUKEST  Moderate*   < >  Small   RBCU  47*   < >   --    WBCU  14*   < >   --     < > = values in this interval not displayed.       PSA:     Post Void Residual: as above   Other labs: None today      IMPRESSION:  76 y/o M with history of AUR s/p PVP with several issues post op, now with rising PVRs but negative cystoscopy     PLAN:  Monitor ins and outs   Follow up in six months     Total Time: 15 minutes                                       Total in Consultation: greater than 50% (this was separate from time spent in cystoscopy)       Again, thank you for allowing me to participate in the care of your patient.      Sincerely,    Natalie Porter MD

## 2018-03-08 NOTE — MR AVS SNAPSHOT
After Visit Summary   3/8/2018    Jacinto Flannery    MRN: [id number]           Patient Information     Date Of Birth          1940        Visit Information        Provider Department      3/8/2018 9:45 AM Natalie Porter MD [facility] [place]        Today's Diagnoses     Urinary retention    -  1       Follow-ups after your visit        Follow-up notes from your care team     Return in about 6 months (around 9/8/2018).      Your next 10 appointments already scheduled     Sep 04, 2018 11:00 AM CDT   (Arrive by 10:45 AM)   Return Visit with Nadine Orozco MD   [facility] ([facility])    [address] [place] [address]  4th [facility] [address] [phone]              Who to contact     If you have questions or need follow up information about today's clinic visit or your schedule please contact [facility] [place] directly at [phone].  Normal or non-critical lab and imaging results will be communicated to you by Valued Relationshipshart, letter or phone within 4 business days after the clinic has received the results. If you do not hear from us within 7 days, please contact the clinic through [email or web address] or phone. If you have a critical or abnormal lab result, we will notify you by phone as soon as possible.  Submit refill requests through Zaelab or call your pharmacy and they will forward the refill request to us. Please allow 3 business days for your refill to be completed.          Additional Information About Your Visit        Valued Relationshipshart Information     Zaelab gives you secure access to your electronic health record. If you see a primary care provider, you can also send messages to your care team and make appointments. If you have questions, please call your primary care clinic.  If you do not have a primary care provider, please call [phone] and they will assist you.       "  Care EveryWhere ID     This is your Care EveryWhere ID. This could be used by other organizations to access your [place] medical records  [id number]        Your Vitals Were     Pulse Height BMI (Body Mass Index)             80 1.778 m (5' 10\") 24.39 kg/m2          Blood Pressure from Last 3 Encounters:   03/08/18 112/62   02/21/18 129/78   02/05/18 125/71    Weight from Last 3 Encounters:   03/08/18 77.1 kg (170 lb)   02/21/18 79.5 kg (175 lb 3.2 oz)   02/05/18 77.1 kg (170 lb)              Today, you had the following     No orders found for display       Primary Care Provider Office Phone # Fax #    Nadine Isabel Orozco [phone] [address] [address]        Equal Access to Services     [facility]: Hadii aad ku hadasho Soomaali, waaxda luqadaha, qaybta kaalmada adeegyada, waxay erikin hayaan lakesha anaya . So [facility] [phone].    ATENCIÓN: Si habla español, tiene a vargas disposición servicios gratuitos de asistencia lingüística. Llame al [phone].    We comply with applicable federal civil rights laws and [place] laws. We do not discriminate on the basis of race, color, national origin, age, disability, sex, sexual orientation, or gender identity.            Thank you!     Thank you for choosing [facility] [place]  for your care. Our goal is always to provide you with excellent care. Hearing back from our patients is one way we can continue to improve our services. Please take a few minutes to complete the written survey that you may receive in the mail after your visit with us. Thank you!             Your Updated Medication List - Protect others around you: Learn how to safely use, store and throw away your medicines at www.disposemymeds.org.          This list is accurate as of 3/8/18  9:32 PM.  Always use your most recent med list.                   Brand Name Dispense Instructions for use Diagnosis    " alfuzosin 10 MG 24 hr tablet    UROXATRAL    90 tablet    TAKE ONE TABLET BY MOUTH ONCE DAILY    Benign non-nodular prostatic hyperplasia with lower urinary tract symptoms       aspirin 81 MG tablet      Take 81 mg by mouth daily        B-12 1000 MCG Caps      Take 1 capsule by mouth daily        carBAMazepine 200 MG tablet    EPITOL    180 tablet    Take 1 tablet (200 mg) by mouth 2 times daily    Trigeminal neuralgia       eszopiclone 2 MG tablet    LUNESTA    15 tablet    Take 1 tablet (2 mg) by mouth nightly as needed for sleep    Persistent insomnia       metoprolol succinate 25 MG 24 hr tablet    TOPROL-XL    45 tablet    TAKE ONE-HALF TABLET BY MOUTH ONCE DAILY    Coronary artery disease involving bypass graft of transplanted heart without angina pectoris       metroNIDAZOLE 0.75 % cream    METROCREAM    45 g    Apply topically 2 times daily    Acne rosacea       nitroGLYcerin 0.4 MG sublingual tablet    NITROSTAT    25 tablet    Place 1 tablet (0.4 mg) under the tongue every 5 minutes as needed for chest pain    CAD (coronary artery disease), Status post percutaneous transluminal coronary angioplasty       order for DME     1 Package    Equipment being ordered: Orthotics for both feet, need replacement    Fallen arches       simvastatin 40 MG tablet    ZOCOR    90 tablet    Take 1 tablet (40 mg) by mouth At Bedtime    Coronary artery disease involving native coronary artery of native heart without angina pectoris       valACYclovir 1000 mg tablet    VALTREX    6 tablet    Take 1 pill twice a day for 3 days at the first sign of a cold core.    H/O cold sores

## 2018-03-08 NOTE — NURSING NOTE
Chief Complaint   Patient presents with     Results     UDS     Neil Plunkett LPN 9:48 AM March 8, 2018

## 2018-03-09 NOTE — PROGRESS NOTES
UROLOGIC DIAGNOSES:       CURRENT INTERVENTIONS:       HISTORY:   Patient with history of AUR s/p PVP complicated by hematuria.       Patient states that compared to five years ago, he is doing great.   Wakes once in the evening. Notes some post void dribbling.   Notes that he does not empty completely, but that this is standard for him.   No further retention or hesitancy.     At last visit, patient had high PVR but cystoscopy revealed open bladder neck and minimal re-growth.     Patient had attempted UDS but no one was able to pass catheter to the bladder.     We discussed UDS findings as well as possible future treatment including CIC, UDS, further monitoring.                PAST MEDICAL HISTORY:   Past Medical History:   Diagnosis Date     BPH (benign prostatic hyperplasia)      CAD (coronary artery disease)     MI 1989, CABG 1990, 11/2014 PCI to LM, LCx and RCA     Conductive hearing loss Unknown.     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     Skin disease      Squamous cell carcinoma        PAST SURGICAL HISTORY:   Past Surgical History:   Procedure Laterality Date     C CABG, VEIN, THREE  1990    SVG-LAD in [place]     CHOLECYSTECTOMY  1992    in [place]     ENDOSCOPY  04/26/2013    Done at [facility] in [place], [place]     GENITOURINARY SURGERY       HEART CATH STENT COR W/WO PTCA  11/12/2014    x 4 ANKUR's (two ostial to mid-RCA, one ostial LCx and mid-LM, one ostial LAD)     HERNIA REPAIR Right 2010    in [place]     LASER KTP GREEN LIGHT PHOTOSELECTIVE VAPORIZATION PROSTATE N/A 1/5/2015    Procedure: LASER KTP GREEN LIGHT  "PHOTOSELECTIVE VAPORIZATION PROSTATE;  Surgeon: Natalie Porter MD;  Location: [facility] OR     MOHS MICROGRAPHIC PROCEDURE         FAMILY HISTORY:   Family History   Problem Relation Age of Onset     CANCER Father      Stomach     HEART DISEASE Mother      HEART DISEASE Maternal Grandmother      Skin Cancer No family hx of        SOCIAL HISTORY:   Social History   Substance Use Topics     Smoking status: Former Smoker     Packs/day: 0.50     Years: 1.00     Types: Cigarettes     Start date: 9/1/1965     Quit date: 5/1/1970     Smokeless tobacco: Never Used     Alcohol use Yes      Comment: rarely - a beer a month       Current Outpatient Prescriptions   Medication     simvastatin (ZOCOR) 40 MG tablet     eszopiclone (LUNESTA) 2 MG tablet     metoprolol succinate (TOPROL-XL) 25 MG 24 hr tablet     nitroGLYcerin (NITROSTAT) 0.4 MG sublingual tablet     carBAMazepine (EPITOL) 200 MG tablet     metroNIDAZOLE (METROCREAM) 0.75 % cream     alfuzosin (UROXATRAL) 10 MG 24 hr tablet     valACYclovir (VALTREX) 1000 mg tablet     order for DME     aspirin 81 MG tablet     Cyanocobalamin (B-12) 1000 MCG CAPS     No current facility-administered medications for this visit.          PHYSICAL EXAM:    /62  Pulse 80  Ht 1.778 m (5' 10\")  Wt 77.1 kg (170 lb)  BMI 24.39 kg/m2    HEENT: Normocephalic and atraumatic   Cardiac: Not done  Back/Flank: Not done  CNS/PNS: Not done  Respiratory: Normal non-labored breathing  Abdomen: Soft nontender and nondistended  Peripheral Vascular: Not done  Mental Status: Not done    Penis: Not done  Scrotal Skin: Not done  Testicles: Not done  Epididymis: Not done  Digital Rectal Exam:     Cystoscopy:       Imaging: None    Urinalysis: UA RESULTS:  Recent Labs   Lab Test  01/16/15   0053  11/26/14   COLOR  Dark Red   < >  Red   APPEARANCE  Slightly Cloudy   < >  Clear   URINEGLC  Negative   < >  Neg   URINEBILI  Negative   < >  Small   URINEKETONE  5*   < >  Neg   SG  1.005   < >  1.010 "   UBLD  Large*   < >  Large   URINEPH  6.5   < >  5.0   PROTEIN  100*   < >  Trace   UROBILINOGEN   --    --   0.2   NITRITE  Negative   < >  Pos   LEUKEST  Moderate*   < >  Small   RBCU  47*   < >   --    WBCU  14*   < >   --     < > = values in this interval not displayed.       PSA:     Post Void Residual: as above   Other labs: None today      IMPRESSION:  78 y/o M with history of AUR s/p PVP with several issues post op, now with rising PVRs but negative cystoscopy     PLAN:  Monitor ins and outs   Follow up in six months     Total Time: 15 minutes                                       Total in Consultation: greater than 50% (this was separate from time spent in cystoscopy)

## 2018-08-08 ENCOUNTER — TELEPHONE (OUTPATIENT)
Dept: INTERNAL MEDICINE | Facility: CLINIC | Age: 78
End: 2018-08-08

## 2018-08-08 NOTE — TELEPHONE ENCOUNTER
[facility] Call Center    Phone Message    May a detailed message be left on voicemail: yes    Reason for Call: Other: Pt is following up on his [employer] request for lipid lab order. He never heard back on [employer]. Please review and enter order if okay, notify pt when order is ready for him to schedule.      Action Taken: Message routed to:  [facility] ([facility] pCC

## 2018-08-13 ENCOUNTER — PRE VISIT (OUTPATIENT)
Dept: UROLOGY | Facility: CLINIC | Age: 78
End: 2018-08-13

## 2018-08-22 ENCOUNTER — OFFICE VISIT (OUTPATIENT)
Dept: DERMATOLOGY | Facility: CLINIC | Age: 78
End: 2018-08-22
Payer: COMMERCIAL

## 2018-08-22 DIAGNOSIS — E78.5 HYPERLIPIDEMIA WITH TARGET LDL LESS THAN 70: ICD-10-CM

## 2018-08-22 DIAGNOSIS — Z12.83 SKIN CANCER SCREENING: ICD-10-CM

## 2018-08-22 DIAGNOSIS — L82.1 SEBORRHEIC KERATOSIS: ICD-10-CM

## 2018-08-22 DIAGNOSIS — L71.9 ROSACEA: ICD-10-CM

## 2018-08-22 DIAGNOSIS — Z85.828 HISTORY OF NONMELANOMA SKIN CANCER: Primary | ICD-10-CM

## 2018-08-22 LAB
ALBUMIN SERPL-MCNC: 4 G/DL (ref 3.4–5)
ALP SERPL-CCNC: 56 U/L (ref 40–150)
ALT SERPL W P-5'-P-CCNC: 22 U/L (ref 0–70)
ANION GAP SERPL CALCULATED.3IONS-SCNC: 5 MMOL/L (ref 3–14)
AST SERPL W P-5'-P-CCNC: 24 U/L (ref 0–45)
BILIRUB SERPL-MCNC: 0.6 MG/DL (ref 0.2–1.3)
BUN SERPL-MCNC: 20 MG/DL (ref 7–30)
CALCIUM SERPL-MCNC: 8.6 MG/DL (ref 8.5–10.1)
CHLORIDE SERPL-SCNC: 106 MMOL/L (ref 94–109)
CHOLEST SERPL-MCNC: 137 MG/DL
CO2 SERPL-SCNC: 29 MMOL/L (ref 20–32)
CREAT SERPL-MCNC: 0.95 MG/DL (ref 0.66–1.25)
GFR SERPL CREATININE-BSD FRML MDRD: 77 ML/MIN/1.7M2
GLUCOSE SERPL-MCNC: 90 MG/DL (ref 70–99)
HDLC SERPL-MCNC: 48 MG/DL
LDLC SERPL CALC-MCNC: 71 MG/DL
NONHDLC SERPL-MCNC: 90 MG/DL
POTASSIUM SERPL-SCNC: 4.1 MMOL/L (ref 3.4–5.3)
PROT SERPL-MCNC: 7 G/DL (ref 6.8–8.8)
SODIUM SERPL-SCNC: 140 MMOL/L (ref 133–144)
TRIGL SERPL-MCNC: 94 MG/DL

## 2018-08-22 ASSESSMENT — PAIN SCALES - GENERAL: PAINLEVEL: NO PAIN (0)

## 2018-08-22 NOTE — LETTER
8/22/2018       RE: Jacinto Flannery  [address]  [place] [address]     Dear Colleague,    Thank you for referring your patient, Jacinto Flannery, to the [facility] DERMATOLOGY at [facility]. Please see a copy of my visit note below.    [facility] Dermatology Note      Dermatology Problem List:  1.  NMSC  -SCC, left (dorsal) forearm, s/p excision 8/18/2015  -SCC, right dorsal hand, s/p Mohs 3/18/2015  2. Compound nevus with moderate dysplasia, mid upper back  -s/p biopsy 10/16/2015  3. Actinic keratosis    -s/p cryotherapy 07/20/2016  -efudex in November 2016 (used for 19 days), pt had a very robust response  -pt given hydrocortisone 2.5% cream BID PRN due to severe response and told to stop efudex  4. Unknown skin eruption penis. Resolved 7/5/17  -s/p triamcinolone cream initiated 3/17/2016  -s/p Nystatin initiated 3/17/2016  -s/p Mupirocin initiated 2/16/2015  -s/p Valtrex initiated 2/6/2015  -s/p biopsy showing hemorraghic scale crust and ulceration 2/3/2015  5. Drug exanthem, 2/2 to tamsulosin  -s/p biopsy showing interface dermatitis 12/29/2014  -s/p lidex initiated 1/8/2015  -s/p clobetasol initiated 12/29/2014-improved  6. Traumatic tattoo  -Central forehead, measured 1X1.5cm on 10/16/17  7. Rosacea  -Metronidazole cream    Encounter Date: Aug 22, 2018    CC:   Chief Complaint   Patient presents with     Derm Problem     Jacinto is here for his 6 mo exam. He notes no changes or improvements. He does have an area of concern on his left cheek bone.     History of Present Illness:    Mr. Jacinto Flannery is a 78 year old male who presents today for follow up and to reevaluate an irritated spot on the nose. He also now has some spots on his left cheek. He has an extensive derm history (see above). He started using metro cream in October of 2017 (approximately) for rosacea which he uses intermittently now and believes his rosacea is under good control. He states sometimes  when in the sun he notices a brown patch/red patch on his left cheek. He wears sunscreen and hats and tries to avoid significant sun exposure when [place] likes [place] He is currently in the sun quite a bit building a deck. He is otherwise feeling well today and has no other areas of concern.    Past Medical History:   Patient Active Problem List   Diagnosis     CAD S/P percutaneous coronary angioplasty     CAD (coronary artery disease)     BPH (benign prostatic hyperplasia)     Hyperlipidemia with target LDL less than 70     S/P coronary artery stent placement     GERD (gastroesophageal reflux disease)     S/P TURP     Dermatitis     AK (actinic keratosis)     SCC (squamous cell carcinoma)     SK (seborrheic keratosis)     EIC (epidermal inclusion cyst)     History of squamous cell carcinoma     Seborrheic keratosis     Inflamed seborrheic keratosis     History of dysplastic nevus     Varicose veins     Medication reaction, initial encounter     Past Medical History:   Diagnosis Date     BPH (benign prostatic hyperplasia)      CAD (coronary artery disease)     MI 1989, CABG 1990, 11/2014 PCI to LM, LCx and RCA     Conductive hearing loss Unknown.     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     Skin disease      Squamous cell carcinoma      Past Surgical History:   Procedure Laterality Date     C CABG, VEIN, THREE  1990    SVG-LAD in [place]     CHOLECYSTECTOMY  1992    in [place]     ENDOSCOPY  04/26/2013    Done at [facility] in [place], [place]     GENITOURINARY SURGERY       HEART CATH STENT COR W/WO PTCA  11/12/2014     x 4 ANKUR's (two ostial to mid-RCA, one ostial LCx and mid-LM, one ostial LAD)     HERNIA REPAIR Right 2010    in [place]     LASER KTP GREEN LIGHT PHOTOSELECTIVE VAPORIZATION PROSTATE N/A 1/5/2015    Procedure: LASER KTP GREEN LIGHT PHOTOSELECTIVE VAPORIZATION PROSTATE;  Surgeon: Natalie Porter MD;  Location: [facility] OR     MOHS MICROGRAPHIC PROCEDURE         Social History:  Social History     Social History     Marital status:      Spouse name: N/A     Number of children: N/A     Years of education: N/A     Occupational History     Not on file.     Social History Main Topics     Smoking status: Former Smoker     Packs/day: 0.50     Years: 1.00     Types: Cigarettes     Start date: 9/1/1965     Quit date: 5/1/1970     Smokeless tobacco: Never Used     Alcohol use Yes      Comment: rarely - a beer a month     Drug use: No     Sexual activity: Yes     Partners: Female     Birth control/ protection: Male Surgical     Other Topics Concern     Not on file     Social History Narrative       Family History:  Family History   Problem Relation Age of Onset     Cancer Father      Stomach     HEART DISEASE Mother      HEART DISEASE Maternal Grandmother      Skin Cancer No family hx of      Melanoma No family hx of        Medications:  Current Outpatient Prescriptions   Medication Sig Dispense Refill     alfuzosin (UROXATRAL) 10 MG 24 hr tablet TAKE ONE TABLET BY MOUTH ONCE DAILY 90 tablet 3     aspirin 81 MG tablet Take 81 mg by mouth daily       carBAMazepine (EPITOL) 200 MG tablet Take 1 tablet (200 mg) by mouth 2 times daily 180 tablet 3     Cyanocobalamin (B-12) 1000 MCG CAPS Take 1 capsule by mouth daily        eszopiclone (LUNESTA) 2 MG tablet Take 1 tablet (2 mg) by mouth nightly as needed for sleep 15 tablet 2     metoprolol succinate (TOPROL-XL) 25 MG 24 hr tablet TAKE ONE-HALF TABLET BY MOUTH ONCE DAILY 45 tablet 3     metroNIDAZOLE (METROCREAM) 0.75 % cream Apply topically 2 times daily 45 g 11      nitroGLYcerin (NITROSTAT) 0.4 MG sublingual tablet Place 1 tablet (0.4 mg) under the tongue every 5 minutes as needed for chest pain 25 tablet 3     order for DME Equipment being ordered: Orthotics for both feet, need replacement 1 Package 1     simvastatin (ZOCOR) 40 MG tablet Take 1 tablet (40 mg) by mouth At Bedtime 90 tablet 3     valACYclovir (VALTREX) 1000 mg tablet Take 1 pill twice a day for 3 days at the first sign of a cold core. 6 tablet 3        Allergies   Allergen Reactions     Macrodantin [Nitrofurantoin] Rash     Sulfa Drugs Rash     Tamsulosin Rash         Review of Systems:  -As per HPI  -Skin: As above in HPI. No additional skin concerns.    Physical exam:  Vitals: There were no vitals taken for this visit.  GEN: This is a well developed, well-nourished male in no acute distress, in a pleasant mood.    SKIN: Full skin, which includes the head/face, both arms, chest, back, abdomen,both legs, genitalia and/or groin buttocks, digits and/or nails, was examined.  -There are erythematous papules and scattered telangectasias on the bilateral cheeks and nose.   -There is no erythema, telangectasias, nodularity, or pigmentation on the back and left forearm.  -There are waxy stuck on tan to brown papules on the trunk and extremities.  -multiple clinically benign tan brown nevi on trunk and extremities  -there is a flesh colored stuck on papule with excoriation on the left frontal scalp  -No other lesions of concern on areas examined.     Impression/Plan:  1. Rosacea    Continue metrocream twice daily as needed    Discussed that rosacea can become more prominent with sun exposure and recommended liberally use of broad spectrum sunscreen     2. Seborrheic keratosis, non irritated  Benign nature discussed and patient reassured  No further intervention, patient to report changes     3. History of nonmelanoma skin cancer, no clincial evidence of recurrence    We will continue to follow every six months per  patient preference    The nature of sun-induced photo-aging and skin cancers is discussed.  Sun avoidance, protective clothing, and the use of 30-SPF sunscreens is advised. Observe closely for skin damage/changes, and call if such occurs.    Resources for sun protective clothing provided, including coolibar coupon    Follow-up in 6 months, earlier for new or changing lesions.      staffed the patient.    Staff Involved:  Resident(Julia Painter)/Staff(as above)  .I, Anjelica Quarles MD, saw this patient with the resident and agree with the resident s findings and plan of care as documented in the resident s note.      Again, thank you for allowing me to participate in the care of your patient.      Sincerely,    Anjelica Quarles MD

## 2018-08-22 NOTE — PROGRESS NOTES
[facility] Dermatology Note      Dermatology Problem List:  1.  NMSC  -SCC, left (dorsal) forearm, s/p excision 8/18/2015  -SCC, right dorsal hand, s/p Mohs 3/18/2015  2. Compound nevus with moderate dysplasia, mid upper back  -s/p biopsy 10/16/2015  3. Actinic keratosis    -s/p cryotherapy 07/20/2016  -efudex in November 2016 (used for 19 days), pt had a very robust response  -pt given hydrocortisone 2.5% cream BID PRN due to severe response and told to stop efudex  4. Unknown skin eruption penis. Resolved 7/5/17  -s/p triamcinolone cream initiated 3/17/2016  -s/p Nystatin initiated 3/17/2016  -s/p Mupirocin initiated 2/16/2015  -s/p Valtrex initiated 2/6/2015  -s/p biopsy showing hemorraghic scale crust and ulceration 2/3/2015  5. Drug exanthem, 2/2 to tamsulosin  -s/p biopsy showing interface dermatitis 12/29/2014  -s/p lidex initiated 1/8/2015  -s/p clobetasol initiated 12/29/2014-improved  6. Traumatic tattoo  -Central forehead, measured 1X1.5cm on 10/16/17  7. Rosacea  -Metronidazole cream    Encounter Date: Aug 22, 2018    CC:   Chief Complaint   Patient presents with     Derm Problem     Jacinto is here for his 6 mo exam. He notes no changes or improvements. He does have an area of concern on his left cheek bone.     History of Present Illness:    Mr. Jacinto Flannery is a 78 year old male who presents today for follow up and to reevaluate an irritated spot on the nose. He also now has some spots on his left cheek. He has an extensive derm history (see above). He started using metro cream in October of 2017 (approximately) for rosacea which he uses intermittently now and believes his rosacea is under good control. He states sometimes when in the sun he notices a brown patch/red patch on his left cheek. He wears sunscreen and hats and tries to avoid significant sun exposure when he likes to golf [place] He is currently in the sun quite a bit building a deck. He is otherwise  feeling well today and has no other areas of concern.    Past Medical History:   Patient Active Problem List   Diagnosis     CAD S/P percutaneous coronary angioplasty     CAD (coronary artery disease)     BPH (benign prostatic hyperplasia)     Hyperlipidemia with target LDL less than 70     S/P coronary artery stent placement     GERD (gastroesophageal reflux disease)     S/P TURP     Dermatitis     AK (actinic keratosis)     SCC (squamous cell carcinoma)     SK (seborrheic keratosis)     EIC (epidermal inclusion cyst)     History of squamous cell carcinoma     Seborrheic keratosis     Inflamed seborrheic keratosis     History of dysplastic nevus     Varicose veins     Medication reaction, initial encounter     Past Medical History:   Diagnosis Date     BPH (benign prostatic hyperplasia)      CAD (coronary artery disease)     MI 1989, CABG 1990, 11/2014 PCI to LM, LCx and RCA     Conductive hearing loss Unknown.     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     Skin disease      Squamous cell carcinoma      Past Surgical History:   Procedure Laterality Date     C CABG, VEIN, THREE  1990    SVG-LAD in [place]     CHOLECYSTECTOMY  1992    in [place]     ENDOSCOPY  04/26/2013    Done at [facility] in [place], [place]     GENITOURINARY SURGERY       HEART CATH STENT COR W/WO PTCA  11/12/2014    x 4 ANKUR's (two ostial to mid-RCA, one ostial LCx and mid-LM, one ostial LAD)     HERNIA REPAIR Right 2010    in [place]     LASER KTP GREEN LIGHT PHOTOSELECTIVE VAPORIZATION PROSTATE N/A 1/5/2015    Procedure: LASER KTP GREEN LIGHT PHOTOSELECTIVE  VAPORIZATION PROSTATE;  Surgeon: Natalie Porter MD;  Location: [facility] OR     MOHS MICROGRAPHIC PROCEDURE         Social History:  Social History     Social History     Marital status:      Spouse name: N/A     Number of children: N/A     Years of education: N/A     Occupational History     Not on file.     Social History Main Topics     Smoking status: Former Smoker     Packs/day: 0.50     Years: 1.00     Types: Cigarettes     Start date: 9/1/1965     Quit date: 5/1/1970     Smokeless tobacco: Never Used     Alcohol use Yes      Comment: rarely - a beer a month     Drug use: No     Sexual activity: Yes     Partners: Female     Birth control/ protection: Male Surgical     Other Topics Concern     Not on file     Social History Narrative       Family History:  Family History   Problem Relation Age of Onset     Cancer Father      Stomach     HEART DISEASE Mother      HEART DISEASE Maternal Grandmother      Skin Cancer No family hx of      Melanoma No family hx of        Medications:  Current Outpatient Prescriptions   Medication Sig Dispense Refill     alfuzosin (UROXATRAL) 10 MG 24 hr tablet TAKE ONE TABLET BY MOUTH ONCE DAILY 90 tablet 3     aspirin 81 MG tablet Take 81 mg by mouth daily       carBAMazepine (EPITOL) 200 MG tablet Take 1 tablet (200 mg) by mouth 2 times daily 180 tablet 3     Cyanocobalamin (B-12) 1000 MCG CAPS Take 1 capsule by mouth daily        eszopiclone (LUNESTA) 2 MG tablet Take 1 tablet (2 mg) by mouth nightly as needed for sleep 15 tablet 2     metoprolol succinate (TOPROL-XL) 25 MG 24 hr tablet TAKE ONE-HALF TABLET BY MOUTH ONCE DAILY 45 tablet 3     metroNIDAZOLE (METROCREAM) 0.75 % cream Apply topically 2 times daily 45 g 11     nitroGLYcerin (NITROSTAT) 0.4 MG sublingual tablet Place 1 tablet (0.4 mg) under the tongue every 5 minutes as needed for chest pain 25 tablet 3     order for DME Equipment being ordered: Orthotics for both feet, need replacement 1 Package 1      simvastatin (ZOCOR) 40 MG tablet Take 1 tablet (40 mg) by mouth At Bedtime 90 tablet 3     valACYclovir (VALTREX) 1000 mg tablet Take 1 pill twice a day for 3 days at the first sign of a cold core. 6 tablet 3        Allergies   Allergen Reactions     Macrodantin [Nitrofurantoin] Rash     Sulfa Drugs Rash     Tamsulosin Rash         Review of Systems:  -As per HPI  -Skin: As above in HPI. No additional skin concerns.    Physical exam:  Vitals: There were no vitals taken for this visit.  GEN: This is a well developed, well-nourished male in no acute distress, in a pleasant mood.    SKIN: Full skin, which includes the head/face, both arms, chest, back, abdomen,both legs, genitalia and/or groin buttocks, digits and/or nails, was examined.  -There are erythematous papules and scattered telangectasias on the bilateral cheeks and nose.   -There is no erythema, telangectasias, nodularity, or pigmentation on the back and left forearm.  -There are waxy stuck on tan to brown papules on the trunk and extremities.  -multiple clinically benign tan brown nevi on trunk and extremities  -there is a flesh colored stuck on papule with excoriation on the left frontal scalp  -No other lesions of concern on areas examined.     Impression/Plan:  1. Rosacea    Continue metrocream twice daily as needed    Discussed that rosacea can become more prominent with sun exposure and recommended liberally use of broad spectrum sunscreen     2. Seborrheic keratosis, non irritated  Benign nature discussed and patient reassured  No further intervention, patient to report changes     3. History of nonmelanoma skin cancer, no clincial evidence of recurrence    We will continue to follow every six months per patient preference    The nature of sun-induced photo-aging and skin cancers is discussed.  Sun avoidance, protective clothing, and the use of 30-SPF sunscreens is advised. Observe closely for skin damage/changes, and call if such occurs.    Resources  for sun protective clothing provided, including coolibar coupon    Follow-up in 6 months, earlier for new or changing lesions.      staffed the patient.    Staff Involved:  Resident(Julia Painter)/Staff(as above)  .I, Anjelica Quarles MD, saw this patient with the resident and agree with the resident s findings and plan of care as documented in the resident s note.

## 2018-08-22 NOTE — MR AVS SNAPSHOT
After Visit Summary   8/22/2018    Jacinto Flannery    MRN: [id number]           Patient Information     Date Of Birth          1940        Visit Information        Provider Department      8/22/2018 9:30 AM Anjelica Quarles MD [facility] Dermatology        Care Instructions    Recommended use of a broad spectrum sunscreen of at least SPF 30 on all sun exposed sites daily.  Apply 20 minutes prior to exposure and repeat application every two hours or after sweating or swimming.  Avoid any intentional indoor or outdoor tanning.      Sun protective clothing and Resources     Yappsa App Store End (www.myFairPartner.com)  Athleta (RealCrowd.athletaWine Nation)  weeSPIN (www.BuildOut)  Carve Designs (Yachtico.com Yacht Charter & Boat Rental) - affordable  Skinz (Harry and Davidskinz.com)    Long sleeve - Bobby Cool DRI UPF 50 or [place] PFG UPF 50  Hoodie - [place] PFG UPF 50  Swimshirt/Rash Guard - Anne UPF 50 (on Amazon)  Neck - Outdoor Research Ubertubes (www.outdoorPrivacy Networks)    Coolibar*                        Follow-ups after your visit        Follow-up notes from your care team     Return in about 6 months (around 2/22/2019).      Your next 10 appointments already scheduled     Aug 24, 2018 10:30 AM CDT   (Arrive by 10:15 AM)   Return Visit with Natalie Porter MD   [facility] ([facility] [place])    [address] [place] [address] [id number] [phone]            Sep 18, 2018  2:00 PM CDT   (Arrive by 1:45 PM)   Return Visit with Nadine Orozco MD   [facility] ([facility])    [address] [place] [address] [id number] [phone]              Who to contact     Please call your clinic at [phone] to:    Ask questions about your health    Make or cancel appointments    Discuss your medicines    Learn about your test results    Speak to your doctor            Additional  Information About Your Visit        IntelliChemhart Information     [employer] gives you secure access to your electronic health record. If you see a primary care provider, you can also send messages to your care team and make appointments. If you have questions, please call your primary care clinic.  If you do not have a primary care provider, please call [phone] and they will assist you.      [employer] is an electronic gateway that provides easy, online access to your medical records. With [employer], you can request a clinic appointment, read your test results, renew a prescription or communicate with your care team.     To access your existing account, please contact your [facility] or call [phone] for assistance.        Care EveryWhere ID     This is your Care EveryWhere ID. This could be used by other organizations to access your [place] medical records  [id number]         Blood Pressure from Last 3 Encounters:   03/08/18 112/62   02/21/18 129/78   02/05/18 125/71    Weight from Last 3 Encounters:   03/08/18 77.1 kg (170 lb)   02/21/18 79.5 kg (175 lb 3.2 oz)   02/05/18 77.1 kg (170 lb)              Today, you had the following     No orders found for display       Primary Care Provider Office Phone # Fax #    Nadine Isabel Orozco [phone] [address] [address]        Equal Access to Services     DAE [facility] CenterBRE[facility]NA : Hadii aad ku hadasho Soomaali, waaxda luqadaha, qaybta kaalmada adeegyada, jennifer titus hayjoycelyn anaya . So [facility] [phone].    ATENCIÓN: Si habla español, tiene a vargas disposición servicios gratuitos de asistencia lingüística. Llame al [phone].    We comply with applicable federal civil rights laws and [place] laws. We do not discriminate on the basis of race, color, national origin, age, disability, sex, sexual orientation, or gender identity.            Thank you!     Thank you for choosing [facility]  for your care. Our goal is always to provide you with excellent care. Hearing back from our patients is one way we can continue to improve our services. Please take a few minutes to complete the written survey that you may receive in the mail after your visit with us. Thank you!             Your Updated Medication List - Protect others around you: Learn how to safely use, store and throw away your medicines at www.disposemymeds.org.          This list is accurate as of 8/22/18 10:18 AM.  Always use your most recent med list.                   Brand Name Dispense Instructions for use Diagnosis    alfuzosin 10 MG 24 hr tablet    UROXATRAL    90 tablet    TAKE ONE TABLET BY MOUTH ONCE DAILY    Benign non-nodular prostatic hyperplasia with lower urinary tract symptoms       aspirin 81 MG tablet      Take 81 mg by mouth daily        B-12 1000 MCG Caps      Take 1 capsule by mouth daily        carBAMazepine 200 MG tablet    EPITOL    180 tablet    Take 1 tablet (200 mg) by mouth 2 times daily    Trigeminal neuralgia       eszopiclone 2 MG tablet    LUNESTA    15 tablet    Take 1 tablet (2 mg) by mouth nightly as needed for sleep    Persistent insomnia       metoprolol succinate 25 MG 24 hr tablet    TOPROL-XL    45 tablet    TAKE ONE-HALF TABLET BY MOUTH ONCE DAILY    Coronary artery disease involving bypass graft of transplanted heart without angina pectoris       metroNIDAZOLE 0.75 % cream    METROCREAM    45 g    Apply topically 2 times daily    Acne rosacea       nitroGLYcerin 0.4 MG sublingual tablet    NITROSTAT    25 tablet    Place 1 tablet (0.4 mg) under the tongue every 5 minutes as needed for chest pain    CAD (coronary artery disease), Status post percutaneous transluminal coronary angioplasty       order for DME     1 Package    Equipment being ordered: Orthotics for both feet, need replacement    Fallen arches       simvastatin 40 MG tablet    ZOCOR    90 tablet    Take 1 tablet (40 mg) by mouth  At Bedtime    Coronary artery disease involving native coronary artery of native heart without angina pectoris       valACYclovir 1000 mg tablet    VALTREX    6 tablet    Take 1 pill twice a day for 3 days at the first sign of a cold core.    H/O cold sores

## 2018-08-22 NOTE — PATIENT INSTRUCTIONS
Recommended use of a broad spectrum sunscreen of at least SPF 30 on all sun exposed sites daily.  Apply 20 minutes prior to exposure and repeat application every two hours or after sweating or swimming.  Avoid any intentional indoor or outdoor tanning.      Sun protective clothing and Resources     Snappy shuttle End (www.Serveron.com)  Athleta (www.athleta.GetLikeminds)  Body & Soul (www.Kudoala.GetLikeminds)  Carve Designs (Leapforce) - affordable  Skinz ([place] EcoEnergiesskinz.com)    Long sleeve - Bobby Cool DRI UPF 50 or [place] PFG UPF 50  Hoodie - [place] PFG UPF 50  Swimshirt/Rash Guard - Anne UPF 50 (on Amazon)  Neck - Outdoor Research Ubertubes (www.outdoorresearch.com)    Coolibar*

## 2018-08-22 NOTE — NURSING NOTE
Dermatology Rooming Note    Jacinto Flannery's goals for this visit include:   Chief Complaint   Patient presents with     Derm Problem     Jacinto is here for his 6 mo exam. He notes no changes or improvements. He does have an area of concern on his left cheek bone.       Rosa Isela Barajas, EMT

## 2018-08-24 ENCOUNTER — OFFICE VISIT (OUTPATIENT)
Dept: UROLOGY | Facility: CLINIC | Age: 78
End: 2018-08-24
Payer: COMMERCIAL

## 2018-08-24 VITALS
DIASTOLIC BLOOD PRESSURE: 66 MMHG | SYSTOLIC BLOOD PRESSURE: 129 MMHG | WEIGHT: 175.3 LBS | HEART RATE: 73 BPM | BODY MASS INDEX: 25.09 KG/M2 | HEIGHT: 70 IN

## 2018-08-24 DIAGNOSIS — R33.9 URINARY RETENTION: Primary | ICD-10-CM

## 2018-08-24 ASSESSMENT — PAIN SCALES - GENERAL: PAINLEVEL: NO PAIN (0)

## 2018-08-24 NOTE — NURSING NOTE
"Chief Complaint   Patient presents with     RECHECK     BPH       Blood pressure 129/66, pulse 73, height 1.778 m (5' 10\"), weight 79.5 kg (175 lb 4.8 oz). Body mass index is 25.15 kg/(m^2).    Patient Active Problem List   Diagnosis     CAD S/P percutaneous coronary angioplasty     CAD (coronary artery disease)     BPH (benign prostatic hyperplasia)     Hyperlipidemia with target LDL less than 70     S/P coronary artery stent placement     GERD (gastroesophageal reflux disease)     S/P TURP     Dermatitis     AK (actinic keratosis)     SCC (squamous cell carcinoma)     SK (seborrheic keratosis)     EIC (epidermal inclusion cyst)     History of squamous cell carcinoma     Seborrheic keratosis     Inflamed seborrheic keratosis     History of dysplastic nevus     Varicose veins     Medication reaction, initial encounter       Allergies   Allergen Reactions     Macrodantin [Nitrofurantoin] Rash     Sulfa Drugs Rash     Tamsulosin Rash       Current Outpatient Prescriptions   Medication Sig Dispense Refill     alfuzosin (UROXATRAL) 10 MG 24 hr tablet TAKE ONE TABLET BY MOUTH ONCE DAILY 90 tablet 3     aspirin 81 MG tablet Take 81 mg by mouth daily       carBAMazepine (EPITOL) 200 MG tablet Take 1 tablet (200 mg) by mouth 2 times daily 180 tablet 3     Cyanocobalamin (B-12) 1000 MCG CAPS Take 1 capsule by mouth daily        eszopiclone (LUNESTA) 2 MG tablet Take 1 tablet (2 mg) by mouth nightly as needed for sleep 15 tablet 2     metoprolol succinate (TOPROL-XL) 25 MG 24 hr tablet TAKE ONE-HALF TABLET BY MOUTH ONCE DAILY 45 tablet 3     metroNIDAZOLE (METROCREAM) 0.75 % cream Apply topically 2 times daily 45 g 11     nitroGLYcerin (NITROSTAT) 0.4 MG sublingual tablet Place 1 tablet (0.4 mg) under the tongue every 5 minutes as needed for chest pain 25 tablet 3     order for DME Equipment being ordered: Orthotics for both feet, need replacement 1 Package 1     simvastatin (ZOCOR) 40 MG tablet Take 1 tablet (40 mg) by mouth " At Bedtime 90 tablet 3     valACYclovir (VALTREX) 1000 mg tablet Take 1 pill twice a day for 3 days at the first sign of a cold core. 6 tablet 3       Social History   Substance Use Topics     Smoking status: Former Smoker     Packs/day: 0.50     Years: 1.00     Types: Cigarettes     Start date: 9/1/1965     Quit date: 5/1/1970     Smokeless tobacco: Never Used     Alcohol use Yes      Comment: rarely - a beer a month       KAYA Ortiz  8/24/2018  10:15 AM

## 2018-08-24 NOTE — PATIENT INSTRUCTIONS
Return in 6 months with a full bladder for a post void residual.    It was a pleasure meeting with you today.  Thank you for allowing me and my team the privilege of caring for you today.  YOU are the reason we are here, and I truly hope we provided you with the excellent service you deserve.  Please let us know if there is anything else we can do for you so that we can be sure you are leaving completely satisfied with your care experience.

## 2018-08-24 NOTE — LETTER
8/24/2018       RE: Jacinto Flannery  [address]  [facility] [address]     Dear Colleague,    Thank you for referring your patient, Jacinto Flannery, to the [facility] UROLOGY AND INST FOR PROSTATE AND UROLOGIC CANCERS at [facility]. Please see a copy of my visit note below.    UROLOGIC DIAGNOSES:       CURRENT INTERVENTIONS:       HISTORY:   Patient with history of AUR s/p PVP complicated by hematuria.       Patient feels as if things are going well compared to last year.  He has had no UTIs this year and feels that he can manage his symptoms of low flow well.    He sits down in the morning and evening to urinate and feels that he can completely empty during this time.   During the day he has no urgency or concerns with incontinence and urinates 2-3 times. Notes he does not empty completely while standing at the urinal.  Wakes once at night. Notes some post void dribbling.    No further retention or hesitancy.     At last visit, patient had high PVR but cystoscopy revealed open bladder neck and minimal re-growth. PVR today (8/24) was 30ml.    Patient had attempted UDS 2/18 but no one was able to pass catheter to the bladder.     We discussed morning and evening voiding routines, urinary retention, and lower urinary tract symptoms.          PAST MEDICAL HISTORY:   Past Medical History:   Diagnosis Date     BPH (benign prostatic hyperplasia)      CAD (coronary artery disease)     MI 1989, CABG 1990, 11/2014 PCI to LM, LCx and RCA     Conductive hearing loss Unknown.     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     Skin  "disease      Squamous cell carcinoma        PAST SURGICAL HISTORY:   Past Surgical History:   Procedure Laterality Date     C CABG, VEIN, THREE  1990    SVG-LAD in [place]     CHOLECYSTECTOMY  1992    in [place]     ENDOSCOPY  04/26/2013    Done at [facility] in [place], [place]     GENITOURINARY SURGERY       HEART CATH STENT COR W/WO PTCA  11/12/2014    x 4 ANKUR's (two ostial to mid-RCA, one ostial LCx and mid-LM, one ostial LAD)     HERNIA REPAIR Right 2010    in [place]     LASER KTP GREEN LIGHT PHOTOSELECTIVE VAPORIZATION PROSTATE N/A 1/5/2015    Procedure: LASER KTP GREEN LIGHT PHOTOSELECTIVE VAPORIZATION PROSTATE;  Surgeon: Natalie Porter MD;  Location: [facility]     MOHS MICROGRAPHIC PROCEDURE         FAMILY HISTORY:   Family History   Problem Relation Age of Onset     Cancer Father      Stomach     HEART DISEASE Mother      HEART DISEASE Maternal Grandmother      Skin Cancer No family hx of      Melanoma No family hx of        SOCIAL HISTORY:   Social History   Substance Use Topics     Smoking status: Former Smoker     Packs/day: 0.50     Years: 1.00     Types: Cigarettes     Start date: 9/1/1965     Quit date: 5/1/1970     Smokeless tobacco: Never Used     Alcohol use Yes      Comment: rarely - a beer a month       Current Outpatient Prescriptions   Medication     alfuzosin (UROXATRAL) 10 MG 24 hr tablet     aspirin 81 MG tablet     carBAMazepine (EPITOL) 200 MG tablet     Cyanocobalamin (B-12) 1000 MCG CAPS     eszopiclone (LUNESTA) 2 MG tablet     metoprolol succinate (TOPROL-XL) 25 MG 24 hr tablet     metroNIDAZOLE (METROCREAM) 0.75 % cream     nitroGLYcerin (NITROSTAT) 0.4 MG sublingual tablet     order for DME     simvastatin (ZOCOR) 40 MG tablet     valACYclovir (VALTREX) 1000 mg tablet     No current facility-administered medications for this visit.          PHYSICAL EXAM:    /66  Pulse 73  Ht 1.778 m (5' 10\")  Wt 79.5 kg (175 lb 4.8 oz)  BMI 25.15 kg/m2    HEENT: Normocephalic " and atraumatic   Cardiac: Not done  Back/Flank: Not done  CNS/PNS: Not done  Respiratory: Normal non-labored breathing  Abdomen: Soft nontender and nondistended  Peripheral Vascular: Not done  Mental Status: Not done    Penis: Not done  Scrotal Skin: Not done  Testicles: Not done  Epididymis: Not done  Digital Rectal Exam:     Cystoscopy:       Imaging: None    Urinalysis: UA RESULTS:  Recent Labs   Lab Test  01/16/15   0053  11/26/14   COLOR  Dark Red   < >  Red   APPEARANCE  Slightly Cloudy   < >  Clear   URINEGLC  Negative   < >  Neg   URINEBILI  Negative   < >  Small   URINEKETONE  5*   < >  Neg   SG  1.005   < >  1.010   UBLD  Large*   < >  Large   URINEPH  6.5   < >  5.0   PROTEIN  100*   < >  Trace   UROBILINOGEN   --    --   0.2   NITRITE  Negative   < >  Pos   LEUKEST  Moderate*   < >  Small   RBCU  47*   < >   --    WBCU  14*   < >   --     < > = values in this interval not displayed.       PSA:     Post Void Residual: 30ml (8/24)  Other labs: Cr - 0.95 (8/22)      IMPRESSION:  78 y/o M with history of AUR s/p PVP with several issues post op, with negative cystoscopy who presents for followup with a PVR of 30ml and less bothersome LUTS.      PLAN:  - Continue afluzosin  - Continue morning/evening bathroom regimen  - F/u in 6 months for flow/PVR    As the medical student, I acted as a scribe for this note. All portions of the documented history and physical were personally performed by Dr. Porter as the attending physician.    Mitul Packer  MS4    Physician Attestation   I, Natalie Porter, was present with the medical student who participated in the service and in the documentation of the note.  I have verified the history and personally performed the physical exam and medical decision making.  I agree with the assessment and plan of care as documented in the note.      Items personally reviewed: vitals, symptoms, PE.      Total Time: 15 minutes                                       Total in  Consultation: greater than 50%     Again, thank you for allowing me to participate in the care of your patient.      Sincerely,    Natalie Porter MD

## 2018-08-24 NOTE — MR AVS SNAPSHOT
After Visit Summary   8/24/2018    Jacinto Flannery    MRN: [id number]           Patient Information     Date Of Birth          1940        Visit Information        Provider Department      8/24/2018 10:30 AM Natalie Porter MD [facility] Urology and [facility] for Prostate and Urologic Cancers        Care Instructions    Return in 6 months with a full bladder for a post void residual.    It was a pleasure meeting with you today.  Thank you for allowing me and my team the privilege of caring for you today.  YOU are the reason we are here, and I truly hope we provided you with the excellent service you deserve.  Please let us know if there is anything else we can do for you so that we can be sure you are leaving completely satisfied with your care experience.                  Follow-ups after your visit        Your next 10 appointments already scheduled     Sep 18, 2018  2:00 PM CDT   (Arrive by 1:45 PM)   Return Visit with Nadine Orozco MD   [facility] ([facility])    [address] [place] [id number] [phone]              Who to contact     Please call your clinic at [phone] to:    Ask questions about your health    Make or cancel appointments    Discuss your medicines    Learn about your test results    Speak to your doctor            Additional Information About Your Visit        [employer] Information     [employer] gives you secure access to your electronic health record. If you see a primary care provider, you can also send messages to your care team and make appointments. If you have questions, please call your primary care clinic.  If you do not have a primary care provider, please call [phone] and they will assist you.      [employer] is an electronic gateway that provides easy, online access to your medical records. With [employer], you can request a clinic appointment, read your test results, renew a  "prescription or communicate with your care team.     To access your existing account, please contact your [facility] or call [phone] for assistance.        Care EveryWhere ID     This is your Care EveryWhere ID. This could be used by other organizations to access your [place] medical records  [id number]        Your Vitals Were     Pulse Height BMI (Body Mass Index)             73 1.778 m (5' 10\") 25.15 kg/m2          Blood Pressure from Last 3 Encounters:   08/24/18 129/66   03/08/18 112/62   02/21/18 129/78    Weight from Last 3 Encounters:   08/24/18 79.5 kg (175 lb 4.8 oz)   03/08/18 77.1 kg (170 lb)   02/21/18 79.5 kg (175 lb 3.2 oz)              Today, you had the following     No orders found for display       Primary Care Provider Office Phone # Fax #    Nadine Isabel Orozco [phone]       [address] [address]        Equal Access to Services     [facility]: Hadii aad ku hadasho Soomaali, waaxda luqadaha, qaybta kaalmada adeegyada, waxay tacho hayjoycelyn anaya . So [facility] [phone].    ATENCIÓN: Si habla español, tiene a vargas disposición servicios gratuitos de asistencia lingüística. Llame al [phone].    We comply with applicable federal civil rights laws and [place] laws. We do not discriminate on the basis of race, color, national origin, age, disability, sex, sexual orientation, or gender identity.            Thank you!     Thank you for choosing [facility] UROLOGY AND [facility] FOR PROSTATE AND UROLOGIC CANCERS  for your care. Our goal is always to provide you with excellent care. Hearing back from our patients is one way we can continue to improve our services. Please take a few minutes to complete the written survey that you may receive in the mail after your visit with us. Thank you!             Your Updated Medication List - Protect others around you: Learn how to safely use, store and throw away your " medicines at www.disposemymeds.org.          This list is accurate as of 8/24/18 11:05 AM.  Always use your most recent med list.                   Brand Name Dispense Instructions for use Diagnosis    alfuzosin 10 MG 24 hr tablet    UROXATRAL    90 tablet    TAKE ONE TABLET BY MOUTH ONCE DAILY    Benign non-nodular prostatic hyperplasia with lower urinary tract symptoms       aspirin 81 MG tablet      Take 81 mg by mouth daily        B-12 1000 MCG Caps      Take 1 capsule by mouth daily        carBAMazepine 200 MG tablet    EPITOL    180 tablet    Take 1 tablet (200 mg) by mouth 2 times daily    Trigeminal neuralgia       eszopiclone 2 MG tablet    LUNESTA    15 tablet    Take 1 tablet (2 mg) by mouth nightly as needed for sleep    Persistent insomnia       metoprolol succinate 25 MG 24 hr tablet    TOPROL-XL    45 tablet    TAKE ONE-HALF TABLET BY MOUTH ONCE DAILY    Coronary artery disease involving bypass graft of transplanted heart without angina pectoris       metroNIDAZOLE 0.75 % cream    METROCREAM    45 g    Apply topically 2 times daily    Acne rosacea       nitroGLYcerin 0.4 MG sublingual tablet    NITROSTAT    25 tablet    Place 1 tablet (0.4 mg) under the tongue every 5 minutes as needed for chest pain    CAD (coronary artery disease), Status post percutaneous transluminal coronary angioplasty       order for DME     1 Package    Equipment being ordered: Orthotics for both feet, need replacement    Fallen arches       simvastatin 40 MG tablet    ZOCOR    90 tablet    Take 1 tablet (40 mg) by mouth At Bedtime    Coronary artery disease involving native coronary artery of native heart without angina pectoris       valACYclovir 1000 mg tablet    VALTREX    6 tablet    Take 1 pill twice a day for 3 days at the first sign of a cold core.    H/O cold sores

## 2018-08-24 NOTE — PROGRESS NOTES
UROLOGIC DIAGNOSES:       CURRENT INTERVENTIONS:       HISTORY:   Patient with history of AUR s/p PVP complicated by hematuria.       Patient feels as if things are going well compared to last year.  He has had no UTIs this year and feels that he can manage his symptoms of low flow well.    He sits down in the morning and evening to urinate and feels that he can completely empty during this time.   During the day he has no urgency or concerns with incontinence and urinates 2-3 times. Notes he does not empty completely while standing at the urinal.  Wakes once at night. Notes some post void dribbling.    No further retention or hesitancy.     At last visit, patient had high PVR but cystoscopy revealed open bladder neck and minimal re-growth. PVR today (8/24) was 30ml.    Patient had attempted UDS 2/18 but no one was able to pass catheter to the bladder.     We discussed morning and evening voiding routines, urinary retention, and lower urinary tract symptoms.          PAST MEDICAL HISTORY:   Past Medical History:   Diagnosis Date     BPH (benign prostatic hyperplasia)      CAD (coronary artery disease)     MI 1989, CABG 1990, 11/2014 PCI to LM, LCx and RCA     Conductive hearing loss Unknown.     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     Skin disease      Squamous cell carcinoma        PAST SURGICAL HISTORY:   Past Surgical History:   Procedure Laterality Date     C CABG, VEIN, THREE  1990    SVG-LAD in [place]     CHOLECYSTECTOMY  1992    in [place]     ENDOSCOPY  04/26/2013    Done at [facility] in [place], [place]     GENITOURINARY  "SURGERY       HEART CATH STENT COR W/WO PTCA  11/12/2014    x 4 ANKUR's (two ostial to mid-RCA, one ostial LCx and mid-LM, one ostial LAD)     HERNIA REPAIR Right 2010    in [place]     LASER KTP GREEN LIGHT PHOTOSELECTIVE VAPORIZATION PROSTATE N/A 1/5/2015    Procedure: LASER KTP GREEN LIGHT PHOTOSELECTIVE VAPORIZATION PROSTATE;  Surgeon: Natalie Porter MD;  Location: [facility] OR     MOHS MICROGRAPHIC PROCEDURE         FAMILY HISTORY:   Family History   Problem Relation Age of Onset     Cancer Father      Stomach     HEART DISEASE Mother      HEART DISEASE Maternal Grandmother      Skin Cancer No family hx of      Melanoma No family hx of        SOCIAL HISTORY:   Social History   Substance Use Topics     Smoking status: Former Smoker     Packs/day: 0.50     Years: 1.00     Types: Cigarettes     Start date: 9/1/1965     Quit date: 5/1/1970     Smokeless tobacco: Never Used     Alcohol use Yes      Comment: rarely - a beer a month       Current Outpatient Prescriptions   Medication     alfuzosin (UROXATRAL) 10 MG 24 hr tablet     aspirin 81 MG tablet     carBAMazepine (EPITOL) 200 MG tablet     Cyanocobalamin (B-12) 1000 MCG CAPS     eszopiclone (LUNESTA) 2 MG tablet     metoprolol succinate (TOPROL-XL) 25 MG 24 hr tablet     metroNIDAZOLE (METROCREAM) 0.75 % cream     nitroGLYcerin (NITROSTAT) 0.4 MG sublingual tablet     order for DME     simvastatin (ZOCOR) 40 MG tablet     valACYclovir (VALTREX) 1000 mg tablet     No current facility-administered medications for this visit.          PHYSICAL EXAM:    /66  Pulse 73  Ht 1.778 m (5' 10\")  Wt 79.5 kg (175 lb 4.8 oz)  BMI 25.15 kg/m2    HEENT: Normocephalic and atraumatic   Cardiac: Not done  Back/Flank: Not done  CNS/PNS: Not done  Respiratory: Normal non-labored breathing  Abdomen: Soft nontender and nondistended  Peripheral Vascular: Not done  Mental Status: Not done    Penis: Not done  Scrotal Skin: Not done  Testicles: Not done  Epididymis: Not " done  Digital Rectal Exam:     Cystoscopy:       Imaging: None    Urinalysis: UA RESULTS:  Recent Labs   Lab Test  01/16/15   0053  11/26/14   COLOR  Dark Red   < >  Red   APPEARANCE  Slightly Cloudy   < >  Clear   URINEGLC  Negative   < >  Neg   URINEBILI  Negative   < >  Small   URINEKETONE  5*   < >  Neg   SG  1.005   < >  1.010   UBLD  Large*   < >  Large   URINEPH  6.5   < >  5.0   PROTEIN  100*   < >  Trace   UROBILINOGEN   --    --   0.2   NITRITE  Negative   < >  Pos   LEUKEST  Moderate*   < >  Small   RBCU  47*   < >   --    WBCU  14*   < >   --     < > = values in this interval not displayed.       PSA:     Post Void Residual: 30ml (8/24)  Other labs: Cr - 0.95 (8/22)      IMPRESSION:  78 y/o M with history of AUR s/p PVP with several issues post op, with negative cystoscopy who presents for followup with a PVR of 30ml and less bothersome LUTS.      PLAN:  - Continue afluzosin  - Continue morning/evening bathroom regimen  - F/u in 6 months for flow/PVR    As the medical student, I acted as a scribe for this note. All portions of the documented history and physical were personally performed by Dr. Porter as the attending physician.    Mitul Packer  MS4    Physician Attestation   I, Natalie Porter, was present with the medical student who participated in the service and in the documentation of the note.  I have verified the history and personally performed the physical exam and medical decision making.  I agree with the assessment and plan of care as documented in the note.      Items personally reviewed: vitals, symptoms, PE.    Natalie Porter MD      Total Time: 15 minutes                                       Total in Consultation: greater than 50%

## 2018-09-18 ENCOUNTER — OFFICE VISIT (OUTPATIENT)
Dept: INTERNAL MEDICINE | Facility: CLINIC | Age: 78
End: 2018-09-18
Payer: COMMERCIAL

## 2018-09-18 VITALS
SYSTOLIC BLOOD PRESSURE: 120 MMHG | DIASTOLIC BLOOD PRESSURE: 67 MMHG | HEART RATE: 64 BPM | BODY MASS INDEX: 24.74 KG/M2 | WEIGHT: 172.4 LBS

## 2018-09-18 DIAGNOSIS — M21.41 FLAT FEET, BILATERAL: Primary | ICD-10-CM

## 2018-09-18 DIAGNOSIS — G47.00 PERSISTENT INSOMNIA: ICD-10-CM

## 2018-09-18 DIAGNOSIS — M21.42 FLAT FEET, BILATERAL: Primary | ICD-10-CM

## 2018-09-18 RX ORDER — ESZOPICLONE 2 MG/1
2 TABLET, FILM COATED ORAL
Qty: 15 TABLET | Refills: 2 | Status: SHIPPED | OUTPATIENT
Start: 2018-09-18 | End: 2019-08-09

## 2018-09-18 ASSESSMENT — PAIN SCALES - GENERAL: PAINLEVEL: NO PAIN (0)

## 2018-09-18 NOTE — PROGRESS NOTES
Jacinto is here to discuss three issues.  Total time spent 25 minutes.  More than 50% of the time spent with Mr. Flannery on counseling / coordinating his care.    1.  Lab results.  We reviewed recent labs including lipid panel and metabolic profile, all of which are favorable results.    2.  Shoe orthotics for bilateral flat feet.  Without these, he will experience more foot pain and damage to the ligaments in both feet.  It was recommended by a Podiatrist in the past that he have lifelong shoe orthotics in aidee of surgery.  He has done very well with the orthotics since then.  I agree with this plan and believe the orthotics are medically necessary to avoid more foot pain and complications in the future.  This note will serve as documentation to support a new DME order for shoe orthotics.    -     order for DME; Equipment being ordered: bilateral shoe orthotics, 2 pairs dispense at once        3.  Medication refills.  These are all up to date however he requests lunesta, which he uses on ly rarely, for insomnia.  He reports no side effects.  -     eszopiclone (LUNESTA) 2 MG tablet; Take 1 tablet (2 mg) by mouth nightly as needed for sleep        He will RTC in 6 months for routine f/u, sooner prn.    Ernesto Aponte MD

## 2018-09-18 NOTE — MR AVS SNAPSHOT
After Visit Summary   9/18/2018    Jacinto Flannery    MRN: [id number]           Patient Information     Date Of Birth          1940        Visit Information        Provider Department      9/18/2018 2:00 PM Nadine Alonso MD [facility]        Today's Diagnoses     Flat feet, bilateral    -  1    Persistent insomnia          Care Instructions    Primary Care Center Medication Refill Request Information:  * Please contact your pharmacy regarding ANY request for medication refills.  ** [facility] Prescription Fax = [phone]  * Please allow 3 business days for routine medication refills.  * Please allow 5 business days for controlled substance medication refills.     Primary Care Center Test Result notification information:  *You will be notified with in 7-10 days of your appointment day regarding the results of your test.  If you are on MyChart you will be notified as soon as the provider has reviewed the results and signed off on them.    [facility]: [phone]             Follow-ups after your visit        Who to contact     Please call your clinic at [phone] to:    Ask questions about your health    Make or cancel appointments    Discuss your medicines    Learn about your test results    Speak to your doctor            Additional Information About Your Visit        MyChart Information     [employer] gives you secure access to your electronic health record. If you see a primary care provider, you can also send messages to your care team and make appointments. If you have questions, please call your primary care clinic.  If you do not have a primary care provider, please call [phone] and they will assist you.      [employer] is an electronic gateway that provides easy, online access to your medical records. With [employer], you can request a clinic appointment, read your test results, renew a prescription or communicate with your care team.     To access your  existing account, please contact your [facility] or call [phone] for assistance.        Care EveryWhere ID     This is your Care EveryWhere ID. This could be used by other organizations to access your [place] medical records  [id number]        Your Vitals Were     Pulse BMI (Body Mass Index)                64 24.74 kg/m2           Blood Pressure from Last 3 Encounters:   09/18/18 120/67   08/24/18 129/66   03/08/18 112/62    Weight from Last 3 Encounters:   09/18/18 78.2 kg (172 lb 6.4 oz)   08/24/18 79.5 kg (175 lb 4.8 oz)   03/08/18 77.1 kg (170 lb)              Today, you had the following     No orders found for display         Today's Medication Changes          These changes are accurate as of 9/18/18  2:45 PM.  If you have any questions, ask your nurse or doctor.               Start taking these medicines.        Dose/Directions    order for DME   Used for:  Flat feet, bilateral   Started by:  Nadine Alonso MD        Equipment being ordered: bilateral shoe orthotics, 2 pairs dispense at once   Quantity:  1 Device   Refills:  3            Where to get your medicines      Some of these will need a paper prescription and others can be bought over the counter.  Ask your nurse if you have questions.     Bring a paper prescription for each of these medications     eszopiclone 2 MG tablet    order for DME                Primary Care Provider Office Phone # Fax #    Nadine Orozco [phone] [address] [address]        Equal Access to Services     MIGUEL JAUREGUI AH: Hadii aad ku hadasho Soomaali, waaxda luqadaha, qaybta kaalmada adeegyada, jennifer patrick. So [facility] [phone].    ATENCIÓN: Si habla español, tiene a vargas disposición servicios gratuitos de asistencia lingüística. Llame al [phone].    We comply with applicable federal civil rights laws and [place] laws. We do not  discriminate on the basis of race, color, national origin, age, disability, sex, sexual orientation, or gender identity.            Thank you!     Thank you for choosing [facility]  for your care. Our goal is always to provide you with excellent care. Hearing back from our patients is one way we can continue to improve our services. Please take a few minutes to complete the written survey that you may receive in the mail after your visit with us. Thank you!             Your Updated Medication List - Protect others around you: Learn how to safely use, store and throw away your medicines at www.disposemymeds.org.          This list is accurate as of 9/18/18  2:45 PM.  Always use your most recent med list.                   Brand Name Dispense Instructions for use Diagnosis    alfuzosin 10 MG 24 hr tablet    UROXATRAL    90 tablet    TAKE ONE TABLET BY MOUTH ONCE DAILY    Benign non-nodular prostatic hyperplasia with lower urinary tract symptoms       aspirin 81 MG tablet      Take 81 mg by mouth daily        B-12 1000 MCG Caps      Take 1 capsule by mouth daily        carBAMazepine 200 MG tablet    EPITOL    180 tablet    Take 1 tablet (200 mg) by mouth 2 times daily    Trigeminal neuralgia       eszopiclone 2 MG tablet    LUNESTA    15 tablet    Take 1 tablet (2 mg) by mouth nightly as needed for sleep    Persistent insomnia       metoprolol succinate 25 MG 24 hr tablet    TOPROL-XL    45 tablet    TAKE ONE-HALF TABLET BY MOUTH ONCE DAILY    Coronary artery disease involving bypass graft of transplanted heart without angina pectoris       metroNIDAZOLE 0.75 % cream    METROCREAM    45 g    Apply topically 2 times daily    Acne rosacea       nitroGLYcerin 0.4 MG sublingual tablet    NITROSTAT    25 tablet    Place 1 tablet (0.4 mg) under the tongue every 5 minutes as needed for chest pain    CAD (coronary artery disease), Status post percutaneous transluminal coronary angioplasty       order for DME      1 Package    Equipment being ordered: Orthotics for both feet, need replacement    Fallen arches       order for DME     1 Device    Equipment being ordered: bilateral shoe orthotics, 2 pairs dispense at once    Flat feet, bilateral       simvastatin 40 MG tablet    ZOCOR    90 tablet    Take 1 tablet (40 mg) by mouth At Bedtime    Coronary artery disease involving native coronary artery of native heart without angina pectoris       valACYclovir 1000 mg tablet    VALTREX    6 tablet    Take 1 pill twice a day for 3 days at the first sign of a cold core.    H/O cold sores

## 2018-09-18 NOTE — NURSING NOTE
Chief Complaint   Patient presents with     Recheck Medication     patient also wants to review tests       Donny Hidalgo, EMT 2:03 PM on 9/18/2018.

## 2018-09-18 NOTE — PATIENT INSTRUCTIONS
[facility] Medication Refill Request Information:  * Please contact your pharmacy regarding ANY request for medication refills.  ** [facility] Prescription Fax = [phone]  * Please allow 3 business days for routine medication refills.  * Please allow 5 business days for controlled substance medication refills.     [facility] Test Result notification information:  *You will be notified with in 7-10 days of your appointment day regarding the results of your test.  If you are on MyChart you will be notified as soon as the provider has reviewed the results and signed off on them.    [facility]: [phone]

## 2018-09-24 DIAGNOSIS — N40.1 BENIGN NON-NODULAR PROSTATIC HYPERPLASIA WITH LOWER URINARY TRACT SYMPTOMS: ICD-10-CM

## 2018-09-25 NOTE — TELEPHONE ENCOUNTER
alfuzosin (UROXATRAL) 10 MG 24 hr tablet  Last Written Prescription Date:  9/20/17  Last Fill Quantity: 90,   # refills: 3  Last Office Visit :8/24/18  Future Office visit: none      Routing  Because: medium drug alert

## 2018-09-26 RX ORDER — ALFUZOSIN HYDROCHLORIDE 10 MG/1
1 TABLET, EXTENDED RELEASE ORAL DAILY
Qty: 90 TABLET | Refills: 3 | Status: SHIPPED | OUTPATIENT
Start: 2018-09-26 | End: 2019-09-12

## 2018-11-01 ENCOUNTER — OFFICE VISIT (OUTPATIENT)
Dept: FAMILY MEDICINE | Facility: CLINIC | Age: 78
End: 2018-11-01
Payer: COMMERCIAL

## 2018-11-01 VITALS
OXYGEN SATURATION: 98 % | DIASTOLIC BLOOD PRESSURE: 67 MMHG | WEIGHT: 175 LBS | HEART RATE: 68 BPM | SYSTOLIC BLOOD PRESSURE: 106 MMHG | BODY MASS INDEX: 25.11 KG/M2

## 2018-11-01 DIAGNOSIS — K64.4 EXTERNAL HEMORRHOIDS: Primary | ICD-10-CM

## 2018-11-01 ASSESSMENT — PAIN SCALES - GENERAL: PAINLEVEL: NO PAIN (0)

## 2018-11-01 NOTE — PROGRESS NOTES
[facility]  Primary Care Center   Gómez Briggs MD  11/01/2018      Chief Complaint:   Hemorrhoids     History of Present Illness:   Jacinto Flannery is a 78 year old male with a history of hypertension, coronary artery disease with stent placement, squamous cell carcinoma, and myocardial infarction who presents for evaluation of hemorrhoids. A year and a half ago he had an exam, and was told he had a hemorrhoid. He was doing a self exam recently, and saw something funny. He was not able to get a good look, so wanted this examined for him. He denies any pain, bleeding, lumps on fingers, strange drainage, or irregular stool.      Review of Systems:   Pertinent items are noted in HPI, remainder of complete ROS is negative.      Active Medications:      alfuzosin (UROXATRAL) 10 MG 24 hr tablet, Take 1 tablet (10 mg) by mouth daily, Disp: 90 tablet, Rfl: 3     aspirin 81 MG tablet, Take 81 mg by mouth daily, Disp: , Rfl:      carBAMazepine (EPITOL) 200 MG tablet, Take 1 tablet (200 mg) by mouth 2 times daily, Disp: 180 tablet, Rfl: 3     Cyanocobalamin (B-12) 1000 MCG CAPS, Take 1 capsule by mouth daily , Disp: , Rfl:      eszopiclone (LUNESTA) 2 MG tablet, Take 1 tablet (2 mg) by mouth nightly as needed for sleep, Disp: 15 tablet, Rfl: 2     metoprolol succinate (TOPROL-XL) 25 MG 24 hr tablet, TAKE ONE-HALF TABLET BY MOUTH ONCE DAILY, Disp: 45 tablet, Rfl: 3     metroNIDAZOLE (METROCREAM) 0.75 % cream, Apply topically 2 times daily, Disp: 45 g, Rfl: 11     nitroGLYcerin (NITROSTAT) 0.4 MG sublingual tablet, Place 1 tablet (0.4 mg) under the tongue every 5 minutes as needed for chest pain, Disp: 25 tablet, Rfl: 3     simvastatin (ZOCOR) 40 MG tablet, Take 1 tablet (40 mg) by mouth At Bedtime, Disp: 90 tablet, Rfl: 3     valACYclovir (VALTREX) 1000 mg tablet, Take 1 pill twice a day for 3 days at the first sign of a cold core., Disp: 6 tablet, Rfl: 3      Allergies:   Macrodantin [nitrofurantoin]; Sulfa drugs; and  Tamsulosin      Past Medical History:  Benign prostatics hyperplasia  Coronary artery disease  Conductive hearing loss  Gastroesophageal reflux disease  Hearing problem  Herpes simplex virus 2  Hyperlipidemia   Hypertension  Insomnia  Non-ST elevated myocardial infarction  Reduced vision  Coronary artery stent placement  Sensorineural hearing loss  Skin disease  Squamous cell carcinoma   Percutaneous coronary angioplasty  TURP  Dermatitis  Actinics keratosis  Inflamed seborrheic keratosis  Epidermal inclusion cyst  Dysplastic nevus  Varicose veins  Medication reation     Past Surgical History:  Cholecystectomy  Endoscopy  Genitourinary surgery  Heart cath stent  Hernia repair  Laser KTP green light photo selective vaporization prostate  Mohs micrographic procedure     Family History:   Stomach cancer - father  Heart disease - mother maternal grandmother      Social History:   This patient presented alone.   Smoking status: former smoker of one year, 0.5 packs per day, quit 1970  Smokeless tobacco: never  Alcohol use: rarely     Physical Exam:   /67  Pulse 68  Wt 79.4 kg (175 lb)  SpO2 98%  BMI 25.11 kg/m2   Constitutional: Alert. In no distress.  Head: Normocephalic.   ENT: Mucous membranes are moist.   Respiratory: Normal rate and effort.  Musculoskeletal: No gross deformities noted.   Psychiatric: Mentation appears normal. Normal affect.    :no inguinal hernias, normal scrotum, penis,  testes normal, perenium normal to visual inspection and palpation. External examination of rectum reveals one external hemorrhaoid at 5 O' Clock. Not inflamed, tender, or bleeding.      Assessment and Plan:  External hemorrhoid with no symptoms. He will call if problems. He has a normal bowel habit which helps.      Follow-up: PRN.         Scribe Disclosure:   Ora LEE, am serving as a scribe to document services personally performed by Gómez Briggs MD at this visit, based upon the provider's statements  to me. All documentation has been reviewed by the aforementioned provider prior to being entered into the official medical record.     Portions of this medical record were completed by a scribe. UPON MY REVIEW AND AUTHENTICATION BY ELECTRONIC SIGNATURE, this confirms (a) I performed the applicable clinical services, and (b) the record is accurate.   Gómez Briggs MD

## 2018-11-01 NOTE — MR AVS SNAPSHOT
After Visit Summary   11/1/2018    Jacinto Flannery    MRN: [id number]           Patient Information     Date Of Birth          1940        Visit Information        Provider Department      11/1/2018 12:50 PM Gómez Briggs MD [facility]        Today's Diagnoses     External hemorrhoids    -  1      Care Instructions    Primary Care Center Medication Refill Request Information:  * Please contact your pharmacy regarding ANY request for medication refills.  ** [facility] Prescription Fax = [phone]  * Please allow 3 business days for routine medication refills.  * Please allow 5 business days for controlled substance medication refills.     Primary Care Center Test Result notification information:  *You will be notified with in 7-10 days of your appointment day regarding the results of your test.  If you are on MyChart you will be notified as soon as the provider has reviewed the results and signed off on them.    [facility]: [phone]             Follow-ups after your visit        Your next 10 appointments already scheduled     Jan 09, 2019 11:00 AM CST   (Arrive by 10:45 AM)   Return Visit with Nadine Orozco MD   [facility] ([facility])    [address] [place] [id number] [phone]              Who to contact     Please call your clinic at [phone] to:    Ask questions about your health    Make or cancel appointments    Discuss your medicines    Learn about your test results    Speak to your doctor            Additional Information About Your Visit        MyChart Information     [employer] gives you secure access to your electronic health record. If you see a primary care provider, you can also send messages to your care team and make appointments. If you have questions, please call your primary care clinic.  If you do not have a primary care provider, please call [phone] and  they will assist you.      [employer] is an electronic gateway that provides easy, online access to your medical records. With [employer], you can request a clinic appointment, read your test results, renew a prescription or communicate with your care team.     To access your existing account, please contact your [facility] or call [phone] for assistance.        Care EveryWhere ID     This is your Care EveryWhere ID. This could be used by other organizations to access your [place] medical records  [id number]        Your Vitals Were     Pulse Pulse Oximetry BMI (Body Mass Index)             68 98% 25.11 kg/m2          Blood Pressure from Last 3 Encounters:   11/01/18 106/67   09/18/18 120/67   08/24/18 129/66    Weight from Last 3 Encounters:   11/01/18 79.4 kg (175 lb)   09/18/18 78.2 kg (172 lb 6.4 oz)   08/24/18 79.5 kg (175 lb 4.8 oz)              Today, you had the following     No orders found for display       Primary Care Provider Office Phone # Fax #    Nadine Isabel Orozco [phone]       [address] [address]        Equal Access to Services     MIGUEL JAUREGUI : Hadii shaggy baker hadasho Soomaali, waaxda luqadaha, qaybta kaalmada adeegyada, jennifer patrick. So [facility] [phone].    ATENCIÓN: Si habla español, tiene a vargas disposición servicios gratuitos de asistencia lingüística. Llame al [phone].    We comply with applicable federal civil rights laws and [place] laws. We do not discriminate on the basis of race, color, national origin, age, disability, sex, sexual orientation, or gender identity.            Thank you!     Thank you for choosing [facility]  for your care. Our goal is always to provide you with excellent care. Hearing back from our patients is one way we can continue to improve our services. Please take a few minutes to complete the written survey that you may receive in  the mail after your visit with us. Thank you!             Your Updated Medication List - Protect others around you: Learn how to safely use, store and throw away your medicines at www.disposemymeds.org.          This list is accurate as of 11/1/18 11:59 PM.  Always use your most recent med list.                   Brand Name Dispense Instructions for use Diagnosis    alfuzosin 10 MG 24 hr tablet    UROXATRAL    90 tablet    Take 1 tablet (10 mg) by mouth daily    Benign non-nodular prostatic hyperplasia with lower urinary tract symptoms       aspirin 81 MG tablet      Take 81 mg by mouth daily        B-12 1000 MCG Caps      Take 1 capsule by mouth daily        carBAMazepine 200 MG tablet    EPITOL    180 tablet    Take 1 tablet (200 mg) by mouth 2 times daily    Trigeminal neuralgia       eszopiclone 2 MG tablet    LUNESTA    15 tablet    Take 1 tablet (2 mg) by mouth nightly as needed for sleep    Persistent insomnia       metoprolol succinate 25 MG 24 hr tablet    TOPROL-XL    45 tablet    TAKE ONE-HALF TABLET BY MOUTH ONCE DAILY    Coronary artery disease involving bypass graft of transplanted heart without angina pectoris       metroNIDAZOLE 0.75 % cream    METROCREAM    45 g    Apply topically 2 times daily    Acne rosacea       nitroGLYcerin 0.4 MG sublingual tablet    NITROSTAT    25 tablet    Place 1 tablet (0.4 mg) under the tongue every 5 minutes as needed for chest pain    CAD (coronary artery disease), Status post percutaneous transluminal coronary angioplasty       order for DME     1 Package    Equipment being ordered: Orthotics for both feet, need replacement    Fallen arches       order for DME     1 Device    Equipment being ordered: bilateral shoe orthotics, 2 pairs dispense at once    Flat feet, bilateral       simvastatin 40 MG tablet    ZOCOR    90 tablet    Take 1 tablet (40 mg) by mouth At Bedtime    Coronary artery disease involving native coronary artery of native heart without angina  pectoris       valACYclovir 1000 mg tablet    VALTREX    6 tablet    Take 1 pill twice a day for 3 days at the first sign of a cold core.    H/O cold sores

## 2018-11-01 NOTE — PATIENT INSTRUCTIONS
[facility] Medication Refill Request Information:  * Please contact your pharmacy regarding ANY request for medication refills.  ** [facility] Prescription Fax = [phone]  * Please allow 3 business days for routine medication refills.  * Please allow 5 business days for controlled substance medication refills.     [facility] Test Result notification information:  *You will be notified with in 7-10 days of your appointment day regarding the results of your test.  If you are on MyChart you will be notified as soon as the provider has reviewed the results and signed off on them.    [facility]: [phone]

## 2018-11-01 NOTE — NURSING NOTE
Chief Complaint   Patient presents with     Hemorrhoids     Pt wants to confirm if he has hemorrhoids.      Jackeline Andrea LPN at 1:03 PM on 11/1/2018.

## 2019-01-09 ENCOUNTER — OFFICE VISIT (OUTPATIENT)
Dept: INTERNAL MEDICINE | Facility: CLINIC | Age: 79
End: 2019-01-09
Payer: MEDICARE

## 2019-01-09 VITALS
SYSTOLIC BLOOD PRESSURE: 112 MMHG | HEIGHT: 70 IN | OXYGEN SATURATION: 94 % | BODY MASS INDEX: 25.91 KG/M2 | DIASTOLIC BLOOD PRESSURE: 71 MMHG | WEIGHT: 181 LBS | HEART RATE: 73 BPM

## 2019-01-09 DIAGNOSIS — Z00.00 ENCOUNTER FOR MEDICARE ANNUAL WELLNESS EXAM: Primary | ICD-10-CM

## 2019-01-09 ASSESSMENT — MIFFLIN-ST. JEOR: SCORE: 1557.26

## 2019-01-09 ASSESSMENT — PAIN SCALES - GENERAL: PAINLEVEL: NO PAIN (0)

## 2019-01-09 NOTE — PATIENT INSTRUCTIONS
[facility] Medication Refill Request Information:  * Please contact your pharmacy regarding ANY request for medication refills.  ** [facility] Prescription Fax = [phone]  * Please allow 3 business days for routine medication refills.  * Please allow 5 business days for controlled substance medication refills.     [facility] Test Result notification information:  *You will be notified with in 7-10 days of your appointment day regarding the results of your test.  If you are on MyChart you will be notified as soon as the provider has reviewed the results and signed off on them.    [facility]: [phone]

## 2019-01-09 NOTE — NURSING NOTE
Chief Complaint   Patient presents with     Medicare Visit     patient is here for annual medicare wellness      Lin Llamas at 10:49 AM on 1/9/2019.

## 2019-01-22 NOTE — PROGRESS NOTES
"Jacinto is here for an annual wellness exam.  He is a 78 year old man with a past medical history of BPH (benign prostatic hyperplasia), CAD (coronary artery disease), Conductive hearing loss (Unknown.), GERD (gastroesophageal reflux disease), Hearing problem (Unknown), HSV-2 (herpes simplex virus 2) infection (09/2014), Hyperlipidaemia LDL goal < 70, Hypertension, Insomnia, NSTEMI (non-ST elevated myocardial infarction) (H) (11/15/2014), Reduced vision (Mid-adult years), S/P coronary artery stent placement (11/12/14), Sensorineural hearing loss (Unknown.), Skin disease, and Squamous cell carcinoma.   He is doing well, overall.  No history of falling.  Get up and go test is less than 1 minute today.  We reviewed end of life planning, and he is currently full code, but has been expressing concerns to his family about not being kept alive if there is no chance of recovery.    We also reviewed all routine screening for the next 5 years.  Other than zoster immunization, he is currently up to date.  Recent lab work to follow up chronic conditions also up to date.  He denies chest pain or shortness of breath; no change in activity or exercise tolerance.   ROS: 10 point ROS neg other than the symptoms noted above in the HPI.  No changes to past, family or social history at this time.  Cognitive screening: intact  Depression screening:  negative  Current list of providers regularly involved in the patient s care:  Dr. Olivera, Cardiology, Dr. Abreu, Dermatology, and Dr. Porter, Urology    PHYSICAL EXAM:  /71 (BP Location: Right arm, Patient Position: Sitting)   Pulse 73   Ht 1.778 m (5' 10\")   Wt 82.1 kg (181 lb)   SpO2 94%   BMI 25.97 kg/m    Constitutional: no distress, comfortable, pleasant   Eyes: anicteric, normal extra-ocular movements   Ears, Nose and Throat: tympanic membranes clear, nose clear and free of lesions, throat clear, neck supple with full range of motion, no thyromegaly.   Cardiovascular: " regular rate and rhythm, normal S1 and S2, no murmurs, rubs or gallops,  peripheral pulses full and symmetric   Respiratory: clear to auscultation, no wheezes or crackles, normal breath sounds   Gastrointestinal: positive bowel sounds, nontender, no hepatosplenomegaly, no masses   Musculoskeletal: knees full range of motion, no effusion, mild joint space narrrowing  Skin: no concerning lesions, no jaundice   Neurological: normal gait, no tremor   Psychological: appropriate mood   Lymphatic: no cervical lymphadenopathy and no pedal edema    RTC in 6 months for routine f/u, one year for wellness exam    --Nadine Aponte MD

## 2019-02-21 ASSESSMENT — ENCOUNTER SYMPTOMS: DIARRHEA: 1

## 2019-02-22 DIAGNOSIS — I25.812 CORONARY ARTERY DISEASE INVOLVING BYPASS GRAFT OF TRANSPLANTED HEART WITHOUT ANGINA PECTORIS: ICD-10-CM

## 2019-02-25 ENCOUNTER — PRE VISIT (OUTPATIENT)
Dept: UROLOGY | Facility: CLINIC | Age: 79
End: 2019-02-25

## 2019-02-25 RX ORDER — METOPROLOL SUCCINATE 25 MG/1
TABLET, EXTENDED RELEASE ORAL
Qty: 45 TABLET | Refills: 3 | Status: SHIPPED | OUTPATIENT
Start: 2019-02-25 | End: 2020-02-27

## 2019-02-25 NOTE — TELEPHONE ENCOUNTER
Chief Complaint : 6 Mo Follow up     New Hx/Sx: Urinary Retention    Records/Orders/Proced: None in past 6 months    Pt Contacted: Yes-told to come with full bladder    At Rooming: Flow & PVR

## 2019-03-06 ENCOUNTER — OFFICE VISIT (OUTPATIENT)
Dept: DERMATOLOGY | Facility: CLINIC | Age: 79
End: 2019-03-06
Payer: MEDICARE

## 2019-03-06 DIAGNOSIS — D48.5 NEOPLASM OF UNCERTAIN BEHAVIOR OF SKIN: Primary | ICD-10-CM

## 2019-03-06 DIAGNOSIS — L82.1 SEBORRHEIC KERATOSIS: ICD-10-CM

## 2019-03-06 DIAGNOSIS — L71.9 ROSACEA: ICD-10-CM

## 2019-03-06 DIAGNOSIS — L57.0 AK (ACTINIC KERATOSIS): ICD-10-CM

## 2019-03-06 DIAGNOSIS — Z85.828 HISTORY OF NONMELANOMA SKIN CANCER: ICD-10-CM

## 2019-03-06 PROCEDURE — 88305 TISSUE EXAM BY PATHOLOGIST: CPT | Mod: TC | Performed by: DERMATOLOGY

## 2019-03-06 RX ORDER — LIDOCAINE HYDROCHLORIDE AND EPINEPHRINE 10; 10 MG/ML; UG/ML
0.5 INJECTION, SOLUTION INFILTRATION; PERINEURAL ONCE
Status: ACTIVE | OUTPATIENT
Start: 2019-03-06

## 2019-03-06 ASSESSMENT — PAIN SCALES - GENERAL
PAINLEVEL: NO PAIN (1)
PAINLEVEL: NO PAIN (0)

## 2019-03-06 NOTE — LETTER
3/6/2019       RE: Jacinto Flannery  [address]  [place] [address]     Dear Colleague,    Thank you for referring your patient, Jacinto Flannery, to the [facility] DERMATOLOGY at [facility]. Please see a copy of my visit note below.    [facility] Dermatology Note    Dermatology Problem List:  1.  NMSC   -SCC, left (dorsal) forearm, s/p excision 8/18/2015   -SCC, right dorsal hand, s/p Mohs 3/18/2015  2. Compound nevus with moderate dysplasia, mid upper back   -s/p biopsy 10/16/2015  3. Actinic keratosis     -s/p cryotherapy 07/20/2016   -efudex in November 2016 (used for 19 days), pt had a very robust response   -pt given hydrocortisone 2.5% cream BID PRN due to severe response and told to stop efudex  4. Unknown skin eruption penis. Resolved 7/5/17   -s/p triamcinolone cream initiated 3/17/2016   -s/p Nystatin initiated 3/17/2016   -s/p Mupirocin initiated 2/16/2015   -s/p Valtrex initiated 2/6/2015   -s/p biopsy showing hemorraghic scale crust and ulceration 2/3/2015  5. Drug exanthem, 2/2 to tamsulosin   -s/p biopsy showing interface dermatitis 12/29/2014   -s/p lidex initiated 1/8/2015   -s/p clobetasol initiated 12/29/2014-improved  6. Traumatic tattoo   -Central forehead, measured 1X1.5cm on 10/16/17  7. Rosacea   -Metronidazole cream  8. NUB   -right anterior shoulder, bx 3/6/19    Encounter Date: Mar 6, 2019    CC:   Chief Complaint   Patient presents with     Derm Problem     Jacinto is here for a skin check, states his left arm is itchy.      History of Present Illness:    Mr. Jacinto Flannery is a 78 year old male who presents today for follow up skin check. At his last visit he did not require cryo or biopsy. Today, he notes itching intermittently on the right forearm adjacent to his surgery site. He denies any other concerns today and denies any itching, bleeding, tender or non-healing lesions today. He goes to [place] in the summer but is good with sun protection  while there. He has been using Metrocream daily to every other day for his rosacea.    Past Medical History:   Patient Active Problem List   Diagnosis     CAD S/P percutaneous coronary angioplasty     CAD (coronary artery disease)     BPH (benign prostatic hyperplasia)     Hyperlipidemia with target LDL less than 70     S/P coronary artery stent placement     GERD (gastroesophageal reflux disease)     S/P TURP     Dermatitis     AK (actinic keratosis)     SCC (squamous cell carcinoma)     SK (seborrheic keratosis)     EIC (epidermal inclusion cyst)     History of squamous cell carcinoma     Seborrheic keratosis     Inflamed seborrheic keratosis     History of dysplastic nevus     Varicose veins     Medication reaction, initial encounter     Past Medical History:   Diagnosis Date     BPH (benign prostatic hyperplasia)      CAD (coronary artery disease)     MI 1989, CABG 1990, 11/2014 PCI to LM, LCx and RCA     Conductive hearing loss Unknown.     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     Skin disease      Squamous cell carcinoma      Past Surgical History:   Procedure Laterality Date     C CABG, VEIN, THREE  1990    SVG-LAD in [place]     CHOLECYSTECTOMY  1992    in [place]     ENDOSCOPY  04/26/2013    Done at [facility] in [place], [place]     GENITOURINARY SURGERY       HEART CATH STENT COR W/WO PTCA  11/12/2014    x 4 ANKUR's (two ostial to mid-RCA, one ostial LCx and mid-LM, one ostial LAD)     HERNIA REPAIR Right 2010    in [place]     LASER KTP GREEN LIGHT PHOTOSELECTIVE VAPORIZATION PROSTATE N/A 1/5/2015    Procedure: LASER KTP  GREEN LIGHT PHOTOSELECTIVE VAPORIZATION PROSTATE;  Surgeon: Natalie Porter MD;  Location: [facility] OR     MOHS MICROGRAPHIC PROCEDURE         Social History:  Social History     Socioeconomic History     Marital status:      Spouse name: Not on file     Number of children: Not on file     Years of education: Not on file     Highest education level: Not on file   Occupational History     Not on file   Social Needs     Financial resource strain: Not on file     Food insecurity:     Worry: Not on file     Inability: Not on file     Transportation needs:     Medical: Not on file     Non-medical: Not on file   Tobacco Use     Smoking status: Former Smoker     Packs/day: 0.50     Years: 1.00     Pack years: 0.50     Types: Cigarettes     Start date: 1965     Last attempt to quit: 1970     Years since quittin.8     Smokeless tobacco: Never Used   Substance and Sexual Activity     Alcohol use: Yes     Comment: rarely - a beer a month     Drug use: No     Sexual activity: Yes     Partners: Female     Birth control/protection: Male Surgical   Lifestyle     Physical activity:     Days per week: Not on file     Minutes per session: Not on file     Stress: Not on file   Relationships     Social connections:     Talks on phone: Not on file     Gets together: Not on file     Attends Congregation service: Not on file     Active member of club or organization: Not on file     Attends meetings of clubs or organizations: Not on file     Relationship status: Not on file     Intimate partner violence:     Fear of current or ex partner: Not on file     Emotionally abused: Not on file     Physically abused: Not on file     Forced sexual activity: Not on file   Other Topics Concern     Parent/sibling w/ CABG, MI or angioplasty before 65F 55M? Not Asked   Social History Narrative     Not on file       Family History:  Family History   Problem Relation Age of Onset     Cancer Father         Stomach     Heart Disease  Mother      Heart Disease Maternal Grandmother      Skin Cancer No family hx of      Melanoma No family hx of        Medications:  Current Outpatient Medications   Medication Sig Dispense Refill     alfuzosin (UROXATRAL) 10 MG 24 hr tablet Take 1 tablet (10 mg) by mouth daily 90 tablet 3     aspirin 81 MG tablet Take 81 mg by mouth daily       carBAMazepine (EPITOL) 200 MG tablet Take 1 tablet (200 mg) by mouth 2 times daily 180 tablet 3     Cyanocobalamin (B-12) 1000 MCG CAPS Take 1 capsule by mouth daily        eszopiclone (LUNESTA) 2 MG tablet Take 1 tablet (2 mg) by mouth nightly as needed for sleep 15 tablet 2     metoprolol succinate ER (TOPROL-XL) 25 MG 24 hr tablet TAKE ONE-HALF TABLET BY MOUTH ONCE DAILY 45 tablet 3     metroNIDAZOLE (METROCREAM) 0.75 % cream Apply topically 2 times daily 45 g 11     nitroGLYcerin (NITROSTAT) 0.4 MG sublingual tablet Place 1 tablet (0.4 mg) under the tongue every 5 minutes as needed for chest pain 25 tablet 3     order for DME Equipment being ordered: bilateral shoe orthotics, 2 pairs dispense at once 1 Device 3     order for DME Equipment being ordered: Orthotics for both feet, need replacement 1 Package 1     simvastatin (ZOCOR) 40 MG tablet Take 1 tablet (40 mg) by mouth At Bedtime 90 tablet 3     valACYclovir (VALTREX) 1000 mg tablet Take 1 pill twice a day for 3 days at the first sign of a cold core. 6 tablet 3        Allergies   Allergen Reactions     Macrodantin [Nitrofurantoin] Rash     Sulfa Drugs Rash     Tamsulosin Rash         Review of Systems:  -As per HPI  -Skin: As above in HPI. No additional skin concerns.    Physical exam:  Vitals: There were no vitals taken for this visit.  GEN: This is a well developed, well-nourished male in no acute distress, in a pleasant mood.    SKIN: Full skin, which includes the head/face, both arms, chest, back, abdomen, both legs, buttocks, digits and/or nails, was examined.  -There are scattered telangectasias on the bilateral  cheeks and nose without papules or pustules   -There is no erythema, telangectasias, nodularity, or pigmentation on the back and left forearm.  -There are waxy stuck on tan to brown papules on the trunk and extremities.  -multiple clinically benign tan brown nevi on trunk and extremities  -there is a pink gritty papule on the frontal hairline  -there is a 5mm pink macule with tan reticular network at 12 and 6 oclock and central arborizing telangectasias  -No other lesions of concern on areas examined.     Impression/Plan:  1. Rosacea    Continue metrocream twice daily as needed    Discussed that rosacea can become more prominent with sun exposure and recommended liberally use of broad spectrum sunscreen     2. Seborrheic keratosis, non irritated  Benign nature discussed and patient reassured  No further intervention, patient to report changes     3. History of nonmelanoma skin cancer, no clincial evidence of recurrence    We will continue to follow every six months per patient preference    The nature of sun-induced photo-aging and skin cancers is discussed.  Sun avoidance, protective clothing, and the use of 30-SPF sunscreens is advised. Observe closely for skin damage/changes, and call if such occurs.    4. NUB, right anterior shoulder    Shave biopsy:  After discussion of benefits and risks including but not limited to bleeding/bruising, pain/swelling, infection, scar, incomplete removal, nerve damage/numbness, recurrence, and non-diagnostic biopsy, written consent, verbal consent and photographs were obtained. Time-out was performed. The area was cleaned with isopropyl alcohol. 0.5mL of 1% lidocaine with epinephrine was injected to obtain adequate anesthesia of the lesion on the right anterior shoulder. A shave biopsy was performed. Hemostasis was achieved with aluminium chloride. Vaseline and a sterile dressing were applied. The patient tolerated the procedure and no complications were noted. The patient was  provided with verbal and written post care instructions.     5. AK, frontal hairline    Cryotherapy procedure note: After verbal consent and discussion of risks and benefits including but no limited to dyspigmentation/scar, blister, and pain, 1 AK was(were) treated with 1-2mm freeze border for 2 cycles with liquid nitrogen. Post cryotherapy instructions were provided.      Follow-up in 6 months, earlier for new or changing lesions.      staffed the patient.    Staff Involved:  Resident (Jeanette East MD) / Staff (as above)    .I was present for key portions of the biopsy and the entire cryotherapy procedure. Anjelica Quarles MD  .I, Anjelica Quarles MD, saw this patient with the resident and agree with the resident s findings and plan of care as documented in the resident s note.    Again, thank you for allowing me to participate in the care of your patient.      Sincerely,    Anjelica Quarles MD

## 2019-03-06 NOTE — PROGRESS NOTES
[facility] Dermatology Note    Dermatology Problem List:  1.  NMSC   -SCC, left (dorsal) forearm, s/p excision 8/18/2015   -SCC, right dorsal hand, s/p Mohs 3/18/2015  2. Compound nevus with moderate dysplasia, mid upper back   -s/p biopsy 10/16/2015  3. Actinic keratosis     -s/p cryotherapy 07/20/2016   -efudex in November 2016 (used for 19 days), pt had a very robust response   -pt given hydrocortisone 2.5% cream BID PRN due to severe response and told to stop efudex  4. Unknown skin eruption penis. Resolved 7/5/17   -s/p triamcinolone cream initiated 3/17/2016   -s/p Nystatin initiated 3/17/2016   -s/p Mupirocin initiated 2/16/2015   -s/p Valtrex initiated 2/6/2015   -s/p biopsy showing hemorraghic scale crust and ulceration 2/3/2015  5. Drug exanthem, 2/2 to tamsulosin   -s/p biopsy showing interface dermatitis 12/29/2014   -s/p lidex initiated 1/8/2015   -s/p clobetasol initiated 12/29/2014-improved  6. Traumatic tattoo   -Central forehead, measured 1X1.5cm on 10/16/17  7. Rosacea   -Metronidazole cream  8. NUB   -right anterior shoulder, bx 3/6/19    Encounter Date: Mar 6, 2019    CC:   Chief Complaint   Patient presents with     Derm Problem     Jacinto is here for a skin check, states his left arm is itchy.      History of Present Illness:    Mr. Jacinto Flannery is a 78 year old male who presents today for follow up skin check. At his last visit he did not require cryo or biopsy. Today, he notes itching intermittently on the right forearm adjacent to his surgery site. He denies any other concerns today and denies any itching, bleeding, tender or non-healing lesions today. He goes to [place] in the summer but is good with sun protection while there. He has been using Metrocream daily to every other day for his rosacea.    Past Medical History:   Patient Active Problem List   Diagnosis     CAD S/P percutaneous coronary angioplasty     CAD (coronary artery disease)     BPH (benign prostatic  hyperplasia)     Hyperlipidemia with target LDL less than 70     S/P coronary artery stent placement     GERD (gastroesophageal reflux disease)     S/P TURP     Dermatitis     AK (actinic keratosis)     SCC (squamous cell carcinoma)     SK (seborrheic keratosis)     EIC (epidermal inclusion cyst)     History of squamous cell carcinoma     Seborrheic keratosis     Inflamed seborrheic keratosis     History of dysplastic nevus     Varicose veins     Medication reaction, initial encounter     Past Medical History:   Diagnosis Date     BPH (benign prostatic hyperplasia)      CAD (coronary artery disease)     MI 1989, CABG 1990, 11/2014 PCI to LM, LCx and RCA     Conductive hearing loss Unknown.     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     Skin disease      Squamous cell carcinoma      Past Surgical History:   Procedure Laterality Date     C CABG, VEIN, THREE  1990    SVG-LAD in [place]     CHOLECYSTECTOMY  1992    in [place]     ENDOSCOPY  04/26/2013    Done at [facility] in [place], [place]     GENITOURINARY SURGERY       HEART CATH STENT COR W/WO PTCA  11/12/2014    x 4 ANKUR's (two ostial to mid-RCA, one ostial LCx and mid-LM, one ostial LAD)     HERNIA REPAIR Right 2010    in [place]     LASER KTP GREEN LIGHT PHOTOSELECTIVE VAPORIZATION PROSTATE N/A 1/5/2015    Procedure: LASER KTP GREEN LIGHT PHOTOSELECTIVE VAPORIZATION PROSTATE;  Surgeon: Natalie Porter MD;  Location: [facility] OR     MOHS MICROGRAPHIC PROCEDURE         Social History:  Social History     Socioeconomic History     Marital status:      Spouse name: Not on file      Number of children: Not on file     Years of education: Not on file     Highest education level: Not on file   Occupational History     Not on file   Social Needs     Financial resource strain: Not on file     Food insecurity:     Worry: Not on file     Inability: Not on file     Transportation needs:     Medical: Not on file     Non-medical: Not on file   Tobacco Use     Smoking status: Former Smoker     Packs/day: 0.50     Years: 1.00     Pack years: 0.50     Types: Cigarettes     Start date: 1965     Last attempt to quit: 1970     Years since quittin.8     Smokeless tobacco: Never Used   Substance and Sexual Activity     Alcohol use: Yes     Comment: rarely - a beer a month     Drug use: No     Sexual activity: Yes     Partners: Female     Birth control/protection: Male Surgical   Lifestyle     Physical activity:     Days per week: Not on file     Minutes per session: Not on file     Stress: Not on file   Relationships     Social connections:     Talks on phone: Not on file     Gets together: Not on file     Attends Orthodoxy service: Not on file     Active member of club or organization: Not on file     Attends meetings of clubs or organizations: Not on file     Relationship status: Not on file     Intimate partner violence:     Fear of current or ex partner: Not on file     Emotionally abused: Not on file     Physically abused: Not on file     Forced sexual activity: Not on file   Other Topics Concern     Parent/sibling w/ CABG, MI or angioplasty before 65F 55M? Not Asked   Social History Narrative     Not on file       Family History:  Family History   Problem Relation Age of Onset     Cancer Father         Stomach     Heart Disease Mother      Heart Disease Maternal Grandmother      Skin Cancer No family hx of      Melanoma No family hx of        Medications:  Current Outpatient Medications   Medication Sig Dispense Refill     alfuzosin (UROXATRAL) 10 MG 24 hr tablet Take 1 tablet (10 mg) by  mouth daily 90 tablet 3     aspirin 81 MG tablet Take 81 mg by mouth daily       carBAMazepine (EPITOL) 200 MG tablet Take 1 tablet (200 mg) by mouth 2 times daily 180 tablet 3     Cyanocobalamin (B-12) 1000 MCG CAPS Take 1 capsule by mouth daily        eszopiclone (LUNESTA) 2 MG tablet Take 1 tablet (2 mg) by mouth nightly as needed for sleep 15 tablet 2     metoprolol succinate ER (TOPROL-XL) 25 MG 24 hr tablet TAKE ONE-HALF TABLET BY MOUTH ONCE DAILY 45 tablet 3     metroNIDAZOLE (METROCREAM) 0.75 % cream Apply topically 2 times daily 45 g 11     nitroGLYcerin (NITROSTAT) 0.4 MG sublingual tablet Place 1 tablet (0.4 mg) under the tongue every 5 minutes as needed for chest pain 25 tablet 3     order for DME Equipment being ordered: bilateral shoe orthotics, 2 pairs dispense at once 1 Device 3     order for DME Equipment being ordered: Orthotics for both feet, need replacement 1 Package 1     simvastatin (ZOCOR) 40 MG tablet Take 1 tablet (40 mg) by mouth At Bedtime 90 tablet 3     valACYclovir (VALTREX) 1000 mg tablet Take 1 pill twice a day for 3 days at the first sign of a cold core. 6 tablet 3        Allergies   Allergen Reactions     Macrodantin [Nitrofurantoin] Rash     Sulfa Drugs Rash     Tamsulosin Rash         Review of Systems:  -As per HPI  -Skin: As above in HPI. No additional skin concerns.    Physical exam:  Vitals: There were no vitals taken for this visit.  GEN: This is a well developed, well-nourished male in no acute distress, in a pleasant mood.    SKIN: Full skin, which includes the head/face, both arms, chest, back, abdomen, both legs, buttocks, digits and/or nails, was examined.  -There are scattered telangectasias on the bilateral cheeks and nose without papules or pustules   -There is no erythema, telangectasias, nodularity, or pigmentation on the back and left forearm.  -There are waxy stuck on tan to brown papules on the trunk and extremities.  -multiple clinically benign tan brown nevi  on trunk and extremities  -there is a pink gritty papule on the frontal hairline  -there is a 5mm pink macule with tan reticular network at 12 and 6 oclock and central arborizing telangectasias  -No other lesions of concern on areas examined.     Impression/Plan:  1. Rosacea    Continue metrocream twice daily as needed    Discussed that rosacea can become more prominent with sun exposure and recommended liberally use of broad spectrum sunscreen     2. Seborrheic keratosis, non irritated  Benign nature discussed and patient reassured  No further intervention, patient to report changes     3. History of nonmelanoma skin cancer, no clincial evidence of recurrence    We will continue to follow every six months per patient preference    The nature of sun-induced photo-aging and skin cancers is discussed.  Sun avoidance, protective clothing, and the use of 30-SPF sunscreens is advised. Observe closely for skin damage/changes, and call if such occurs.    4. NUB, right anterior shoulder    Shave biopsy:  After discussion of benefits and risks including but not limited to bleeding/bruising, pain/swelling, infection, scar, incomplete removal, nerve damage/numbness, recurrence, and non-diagnostic biopsy, written consent, verbal consent and photographs were obtained. Time-out was performed. The area was cleaned with isopropyl alcohol. 0.5mL of 1% lidocaine with epinephrine was injected to obtain adequate anesthesia of the lesion on the right anterior shoulder. A shave biopsy was performed. Hemostasis was achieved with aluminium chloride. Vaseline and a sterile dressing were applied. The patient tolerated the procedure and no complications were noted. The patient was provided with verbal and written post care instructions.     5. AK, frontal hairline    Cryotherapy procedure note: After verbal consent and discussion of risks and benefits including but no limited to dyspigmentation/scar, blister, and pain, 1 AK was(were) treated  with 1-2mm freeze border for 2 cycles with liquid nitrogen. Post cryotherapy instructions were provided.      Follow-up in 6 months, earlier for new or changing lesions.      staffed the patient.    Staff Involved:  Resident (Jeanette East MD) / Staff (as above)    .I was present for key portions of the biopsy and the entire cryotherapy procedure. Anjelica Quarles MD  .I, Anjelica Quarles MD, saw this patient with the resident and agree with the resident s findings and plan of care as documented in the resident s note.

## 2019-03-06 NOTE — NURSING NOTE
Dermatology Rooming Note    Jacinto Flannery's goals for this visit include:   Chief Complaint   Patient presents with     Derm Problem     Jacinto is here for a skin check, states his left arm is itchy.      Ghada Ibrahim LPN

## 2019-03-06 NOTE — PATIENT INSTRUCTIONS
Wound Care After a Biopsy    What is a skin biopsy?  A skin biopsy allows the doctor to examine a very small piece of tissue under the microscope to determine the diagnosis and the best treatment for the skin condition. A local anesthetic (numbing medicine)  is injected with a very small needle into the skin area to be tested. A small piece of skin is taken from the area. Sometimes a suture (stitch) is used.     What are the risks of a skin biopsy?  I will experience scar, bleeding, swelling, pain, crusting and redness. I may experience incomplete removal or recurrence. Risks of this procedure are excessive bleeding, bruising, infection, nerve damage, numbness, thick (hypertrophic or keloidal) scar and non-diagnostic biopsy.    How should I care for my wound for the first 24 hours?    Keep the wound dry and covered for 24 hours    If it bleeds, hold direct pressure on the area for 15 minutes. If bleeding does not stop then go to the emergency room    Avoid strenuous exercise the first 1-2 days or as your doctor instructs you    How should I care for the wound after 24 hours?    After 24 hours, remove the bandage    You may bathe or shower as normal    If you had a scalp biopsy, you can shampoo as usual and can use shower water to clean the biopsy site daily    Clean the wound twice a day with gentle soap and water    Do not scrub, be gentle    Apply white petroleum/Vaseline after cleaning the wound with a cotton swab or a clean finger, and keep the site covered with a Bandaid /bandage. Bandages are not necessary with a scalp biopsy    If you are unable to cover the site with a Bandaid /bandage, re-apply ointment 2-3 times a day to keep the site moist. Moisture will help with healing    Avoid strenuous activity for first 1-2 days    Avoid lakes, rivers, pools, and oceans until the stitches are removed or the site is healed    How do I clean my wound?    Wash hands thoroughly with soap or use hand  before all  wound care    Clean the wound with gentle soap and water    Apply white petroleum/Vaseline  to wound after it is clean    Replace the Bandaid /bandage to keep the wound covered for the first few days or as instructed by your doctor    If you had a scalp biopsy, warm shower water to the area on a daily basis should suffice    What should I use to clean my wound?     Cotton-tipped applicators (Qtips )    White petroleum jelly (Vaseline ). Use a clean new container and use Q-tips to apply.    Bandaids   as needed    Gentle soap     How should I care for my wound long term?    Do not get your wound dirty    Keep up with wound care for one week or until the area is healed.    A small scab will form and fall off by itself when the area is completely healed. The area will be red and will become pink in color as it heals. Sun protection is very important for how your scar will turn out. Sunscreen with an SPF 30 or greater is recommended once the area is healed.    If you have stitches, stitches need to be removed in 14 days. You may return to our clinic for this or you may have it done locally at your doctor s office.    You should have some soreness but it should be mild and slowly go away over several days. Talk to your doctor about using tylenol for pain,    When should I call my doctor?  If you have increased:     Pain or swelling    Pus or drainage (clear or slightly yellow drainage is ok)    Temperature over 100F    Spreading redness or warmth around wound    When will I hear about my results?  The biopsy results can take 2-3 weeks to come back. The clinic will call you with the results, send you a [employer] message, or have you schedule a follow-up clinic or phone time to discuss the results. Contact our clinics if you do not hear from us in 3 weeks.     Who should I call with questions?    [facility] [place]: [phone]     [facility]: [phone]    For  urgent needs outside of business hours call the [facility] at [phone] and ask for the dermatology resident on call      Cryotherapy    What is it?    Use of a very cold liquid, such as liquid nitrogen, to freeze and destroy abnormal skin cells that need to be removed    What should I expect?    Tenderness and redness    A small blister that might grow and fill with dark purple blood. There may be crusting.    More than one treatment may be needed if the lesions do not go away.    How do I care for the treated area?    Gently wash the area with your hands when bathing.    Use a thin layer of Vaseline to help with healing. You may use a Band-Aid.     The area should heal within 7-10 days and may leave behind a pink or lighter color.     Do not use an antibiotic or Neosporin ointment.     You may take acetaminophen (Tylenol) for pain.     Call your Doctor if you have:    Severe pain    Signs of infection (warmth, redness, cloudy yellow drainage, and or a bad smell)    Questions or concerns    Who should I call with questions?       [facility]: [phone]       [facility]: [phone]       For urgent needs outside of business hours call the [facility] at [phone]        and ask for the dermatology resident on call

## 2019-03-07 ENCOUNTER — OFFICE VISIT (OUTPATIENT)
Dept: CARDIOLOGY | Facility: CLINIC | Age: 79
End: 2019-03-07
Attending: INTERNAL MEDICINE
Payer: MEDICARE

## 2019-03-07 VITALS
SYSTOLIC BLOOD PRESSURE: 104 MMHG | BODY MASS INDEX: 25.05 KG/M2 | WEIGHT: 175 LBS | OXYGEN SATURATION: 98 % | HEART RATE: 78 BPM | HEIGHT: 70 IN | DIASTOLIC BLOOD PRESSURE: 65 MMHG

## 2019-03-07 DIAGNOSIS — I25.10 CORONARY ARTERY DISEASE INVOLVING NATIVE CORONARY ARTERY OF NATIVE HEART WITHOUT ANGINA PECTORIS: Primary | ICD-10-CM

## 2019-03-07 DIAGNOSIS — I10 ESSENTIAL HYPERTENSION: ICD-10-CM

## 2019-03-07 LAB — COPATH REPORT: NORMAL

## 2019-03-07 PROCEDURE — G0463 HOSPITAL OUTPT CLINIC VISIT: HCPCS | Mod: ZF

## 2019-03-07 PROCEDURE — 93010 ELECTROCARDIOGRAM REPORT: CPT | Mod: ZP | Performed by: INTERNAL MEDICINE

## 2019-03-07 PROCEDURE — 99204 OFFICE O/P NEW MOD 45 MIN: CPT | Mod: 25 | Performed by: INTERNAL MEDICINE

## 2019-03-07 ASSESSMENT — MIFFLIN-ST. JEOR: SCORE: 1530.04

## 2019-03-07 ASSESSMENT — PAIN SCALES - GENERAL: PAINLEVEL: NO PAIN (0)

## 2019-03-07 NOTE — NURSING NOTE
Chief Complaint   Patient presents with     Follow Up     78 year old male present for 2 year follow up- Re-Establish care- previous [facility] patient      Vitals were taken and medications were reconciled.     Indy York RMA  9:29 AM'

## 2019-03-07 NOTE — PROGRESS NOTES
I am delighted to see Jacinto Padgetton in consultation for coronary artery disease.    History of Present Illness:  As you know, the patient is a 78 year old  male who previously seen Dr. Austin, last time 2 years ago with schedule II year follow-up.  He has a history of coronary artery disease status post bypass surgery and PCI, last angiogram was in 2016 when graft and stents were all patent.  He is very active at baseline, bikes 45 minutes a day at least 5 times a week and he tries to keep his heart rate at about 110 bpm.  He was just on vacation in [place] and did a lot of hiking.  He is able to do all of his daily activities without any chest discomfort, dyspnea, palpitations, or dizziness.    The following portions of the patient's history were reviewed and updated as appropriate: allergies, current medications, past family history, past medical history, past social history, past surgical history, and the problem list.    Past Medical History:  1.  Coronary artery disease, status post bypass surgery with a single SVG sequential graft to LAD and diagonal in 1990; PCI of RCA, left main, and left circumflex in November 2014.  Most recent angiogram 4/1/2016 when he presented with recurrent chest pain:  Left main: Stented from the ostium into proximal left circumflex stent widely patent  LAD: Small to medium in size  Left circumflex: Small and nondominant, widely patent stent present, diffuse 50% after the stent  RCA: Large dominant system, widely patent stent from the ostium to the mid segment  SVG sequential graft to the LAD and D1: Widely patent  2.  Benign prostatic hyperplasia status post TURP  3.  Hyperlipidemia  4.  Squamous cell carcinoma skin  5.  Hypertension  6.  GERD  7.  Cholecystectomy    Medications:   Aspirin 81 mg daily  Metoprolol succinate 12.5 mg daily  Simvastatin 40 mg nightly    Allergies:    Allergies   Allergen Reactions     Macrodantin [Nitrofurantoin] Rash     Sulfa Drugs Rash     Tamsulosin  Rash         Family History: mother with early CAD but lived to age 90  Father  of cancer    Family History   Problem Relation Age of Onset     Cancer Father         Stomach     Heart Disease Mother      Heart Disease Maternal Grandmother      Skin Cancer No family hx of      Melanoma No family hx of        Psychosocial history:  reports that he quit smoking about 48 years ago. His smoking use included cigarettes. He started smoking about 53 years ago. He has a 0.50 pack-year smoking history. he has never used smokeless tobacco. He reports that he drinks alcohol. He reports that he does not use drugs.    Review of systems:   Cardiovascular: No palpitations, chest pain, shortness of breath at rest, dyspnea with exertion, orthopnea, paroxysmal nocturia dyspnea, nocturia, dizziness, syncope.    In addition,   Constitutional: No change in weight, sleep or appetite.  Normal energy.  No fever or chills  Eyes: Negative for vision changes or eye problems  ENT: No problems with ears, nose or throat.  No difficulty swallowing.  Resp: No coughing, wheezing or shortness of breath  GI: No nausea, vomiting,  heartburn, abdominal pain, diarrhea, constipation or change in bowel habits  : No urinary frequency or dysuria, bladder or kidney problems  Musculoskeletal: No significant muscle or joint pains  Neurologic: No headaches, numbness, tingling, weakness, problems with balance or coordination  Psychiatric: No problems with anxiety, depression or mental health  Heme/immune/allergy: No history of bleeding or clotting problems or anemia.  No allergies or immune system problems  Integumentary: No rashes,worrisome lesions or skin problems      Physical examination  Vitals: Blood pressure 104/65, pulse 78  BMI= 25 (5 foot 10, 175 pounds), O2 saturation 98% on room air    Constitutional: In general, the patient is a pleasant male in no apparent distress.    Eyes: PERRLA.  EOMI.  Sclerae white, not injected.  ENT/mouth:  Normiocephalic and atraumatic.  Nares clear.  Pharynx without erythema or exudate.  Dentition intact.  No adenopathy.  No thyromegaly. Carotids +2/2 bilaterally without bruits.  No jugular venous distension.   Card/Vasc: The PMI is in the 5th ICS in the midclavicular line. There is no heave. Regular rate and rhythm. Normal S1, S2. No murmur, rub, click, or gallop. Pulses are normal bilaterally throughout. No peripheral edema.  Respiratory: Clear to asculation.  No ronchi, wheezes, rales.  No dullness to percussion.   GI: Abdomen is soft, nontender, nondistended. No organomegaly. No AAA.  No bruits.   Integument: No significant bruises or rashes  Neurological: The neurological examination reveal a patient who was oriented to person, place, and time.    Psych: Normal  Heme/Lymph/Immun: no significant adenopathy      I have reviewed the following labs/imaging:  Labs 8/22/2018 potassium 4.1, BUN 0.95, total cholesterol 137, triglycerides 94, HDL 48, LDL 71  Echocardiogram 11/16/2014: Ejection fraction 55-60%, possible basal inferior hypokinesis, normal right ventricle, left atrial diameter 4.2 cm    I have personally and independently reviewed the following:  EKG today 3/7/2019: Normal rhythm with normal intervals      Assessment :  1.  Coronary artery disease.  He has high functional status without any symptoms.  EKG and cardiac exam are normal today.  2.  Hypertension.  Controlled.      Plan:  Continue aspirin, metoprolol, and simvastatin      I spent a total of 30 minutes face to face with  Jacinto Flannery during today's office visit. Over 50% of this time was spent counseling the patient and/or coordinating care regarding management of cardiovascular issues.        The patient is to return in 2 years or sooner if necessary.  The patient understood the treatment plan as outlined above.  There were no barriers to learning.      Karen Akins MD

## 2019-03-07 NOTE — PATIENT INSTRUCTIONS
"You were seen today in the Cardiovascular Clinic at the [facility].     Cardiology Providers you saw during your visit: Dr. Karen Akins    Diagnosis:  Coronary artery disease    Results: discussed with patient    Orders:   EKG    Recommendations:   1. Medication refills done through primary care providers    Follow-up:   2 years- please call [phone] to set up appointment in 2 years          Please feel free to call me with any questions or concerns.       Jia Kelly LPN     Questions and [phone].   First press #1 for the [employer] and then press #3 for \"Medical Questions\" to reach us Cardiology Nurses.      On Call Cardiologist for after hours or on weekends: [phone]   option #4 and ask to speak to the on-call Cardiologist.          If you need a medication refill please contact your pharmacy.  Please allow 3 business days for your refill to be completed.    "

## 2019-03-07 NOTE — LETTER
3/7/2019      RE: Jacinto Flannery  [address]  [facility] [address]       Dear Colleague,    Thank you for the opportunity to participate in the care of your patient, Jacinto Flannery, at the [facility]. Please see a copy of my visit note below.    I am delighted to see Jacinto Flannery in consultation for coronary artery disease.    History of Present Illness:  As you know, the patient is a 78 year old  male who previously seen Dr. Austin, last time 2 years ago with schedule II year follow-up.  He has a history of coronary artery disease status post bypass surgery and PCI, last angiogram was in 2016 when graft and stents were all patent.  He is very active at baseline, bikes 45 minutes a day at least 5 times a week and he tries to keep his heart rate at about 110 bpm.  He was just on vacation in [place] and did a lot of hiking.  He is able to do all of his daily activities without any chest discomfort, dyspnea, palpitations, or dizziness.    The following portions of the patient's history were reviewed and updated as appropriate: allergies, current medications, past family history, past medical history, past social history, past surgical history, and the problem list.    Past Medical History:  1.  Coronary artery disease, status post bypass surgery with a single SVG sequential graft to LAD and diagonal in 1990; PCI of RCA, left main, and left circumflex in November 2014.  Most recent angiogram 4/1/2016 when he presented with recurrent chest pain:  Left main: Stented from the ostium into proximal left circumflex stent widely patent  LAD: Small to medium in size  Left circumflex: Small and nondominant, widely patent stent present, diffuse 50% after the stent  RCA: Large dominant system, widely patent stent from the ostium to the mid segment  SVG sequential graft to the LAD and D1: Widely patent  2.  Benign prostatic hyperplasia status post TURP  3.  Hyperlipidemia  4.  Squamous  cell carcinoma skin  5.  Hypertension  6.  GERD  7.  Cholecystectomy    Medications:   Aspirin 81 mg daily  Metoprolol succinate 12.5 mg daily  Simvastatin 40 mg nightly    Allergies:    Allergies   Allergen Reactions     Macrodantin [Nitrofurantoin] Rash     Sulfa Drugs Rash     Tamsulosin Rash         Family History: mother with early CAD but lived to age 90  Father  of cancer    Family History   Problem Relation Age of Onset     Cancer Father         Stomach     Heart Disease Mother      Heart Disease Maternal Grandmother      Skin Cancer No family hx of      Melanoma No family hx of        Psychosocial history:  reports that he quit smoking about 48 years ago. His smoking use included cigarettes. He started smoking about 53 years ago. He has a 0.50 pack-year smoking history. he has never used smokeless tobacco. He reports that he drinks alcohol. He reports that he does not use drugs.    Review of systems:   Cardiovascular: No palpitations, chest pain, shortness of breath at rest, dyspnea with exertion, orthopnea, paroxysmal nocturia dyspnea, nocturia, dizziness, syncope.    In addition,   Constitutional: No change in weight, sleep or appetite.  Normal energy.  No fever or chills  Eyes: Negative for vision changes or eye problems  ENT: No problems with ears, nose or throat.  No difficulty swallowing.  Resp: No coughing, wheezing or shortness of breath  GI: No nausea, vomiting,  heartburn, abdominal pain, diarrhea, constipation or change in bowel habits  : No urinary frequency or dysuria, bladder or kidney problems  Musculoskeletal: No significant muscle or joint pains  Neurologic: No headaches, numbness, tingling, weakness, problems with balance or coordination  Psychiatric: No problems with anxiety, depression or mental health  Heme/immune/allergy: No history of bleeding or clotting problems or anemia.  No allergies or immune system problems  Integumentary: No rashes,worrisome lesions or skin  problems      Physical examination  Vitals: Blood pressure 104/65, pulse 78  BMI= 25 (5 foot 10, 175 pounds), O2 saturation 98% on room air    Constitutional: In general, the patient is a pleasant male in no apparent distress.    Eyes: PERRLA.  EOMI.  Sclerae white, not injected.  ENT/mouth: Normiocephalic and atraumatic.  Nares clear.  Pharynx without erythema or exudate.  Dentition intact.  No adenopathy.  No thyromegaly. Carotids +2/2 bilaterally without bruits.  No jugular venous distension.   Card/Vasc: The PMI is in the 5th ICS in the midclavicular line. There is no heave. Regular rate and rhythm. Normal S1, S2. No murmur, rub, click, or gallop. Pulses are normal bilaterally throughout. No peripheral edema.  Respiratory: Clear to asculation.  No ronchi, wheezes, rales.  No dullness to percussion.   GI: Abdomen is soft, nontender, nondistended. No organomegaly. No AAA.  No bruits.   Integument: No significant bruises or rashes  Neurological: The neurological examination reveal a patient who was oriented to person, place, and time.    Psych: Normal  Heme/Lymph/Immun: no significant adenopathy      I have reviewed the following labs/imaging:  Labs 8/22/2018 potassium 4.1, BUN 0.95, total cholesterol 137, triglycerides 94, HDL 48, LDL 71  Echocardiogram 11/16/2014: Ejection fraction 55-60%, possible basal inferior hypokinesis, normal right ventricle, left atrial diameter 4.2 cm    I have personally and independently reviewed the following:  EKG today 3/7/2019: Normal rhythm with normal intervals      Assessment :  1.  Coronary artery disease.  He has high functional status without any symptoms.  EKG and cardiac exam are normal today.  2.  Hypertension.  Controlled.      Plan:  Continue aspirin, metoprolol, and simvastatin      I spent a total of 30 minutes face to face with  Jacinto Flannery during today's office visit. Over 50% of this time was spent counseling the patient and/or coordinating care regarding  management of cardiovascular issues.        The patient is to return in 2 years or sooner if necessary.  The patient understood the treatment plan as outlined above.  There were no barriers to learning.      Karen Akins MD

## 2019-03-08 ENCOUNTER — OFFICE VISIT (OUTPATIENT)
Dept: UROLOGY | Facility: CLINIC | Age: 79
End: 2019-03-08
Payer: MEDICARE

## 2019-03-08 VITALS
HEIGHT: 70 IN | WEIGHT: 174.7 LBS | HEART RATE: 70 BPM | DIASTOLIC BLOOD PRESSURE: 67 MMHG | BODY MASS INDEX: 25.01 KG/M2 | SYSTOLIC BLOOD PRESSURE: 107 MMHG

## 2019-03-08 DIAGNOSIS — R33.8 BENIGN PROSTATIC HYPERPLASIA WITH URINARY RETENTION: Primary | ICD-10-CM

## 2019-03-08 DIAGNOSIS — N40.1 BENIGN PROSTATIC HYPERPLASIA WITH URINARY RETENTION: Primary | ICD-10-CM

## 2019-03-08 ASSESSMENT — MIFFLIN-ST. JEOR: SCORE: 1528.68

## 2019-03-08 ASSESSMENT — PAIN SCALES - GENERAL: PAINLEVEL: NO PAIN (0)

## 2019-03-08 NOTE — PATIENT INSTRUCTIONS
Return in six months with a full bladder for a urine flow test.    It was a pleasure meeting with you today.  Thank you for allowing me and my team the privilege of caring for you today.  YOU are the reason we are here, and I truly hope we provided you with the excellent service you deserve.  Please let us know if there is anything else we can do for you so that we can be sure you are leaving completely satisfied with your care experience.

## 2019-03-08 NOTE — LETTER
RE: Jacinto Flannery  [address]  [facility] [address]     Dear Colleague,    Thank you for referring your patient, Jacinto Flannery, to the [facility] UROLOGY AND [facility] FOR PROSTATE AND UROLOGIC CANCERS at [facility]. Please see a copy of my visit note below.    UROLOGIC DIAGNOSES:     CURRENT INTERVENTIONS:       HISTORY:       He sits down in the morning and evening to urinate and feels that he can completely empty during this time.   During the day he has no urgency or concerns with incontinence and urinates 2-3 times. Notes he does not empty completely while standing at the urinal.  Wakes once at night. Notes some post void dribbling.    No further retention or hesitancy.     At last visit, patient had high PVR but cystoscopy revealed open bladder neck and minimal re-growth. PVR today (8/24) was 30ml.    Patient had attempted UDS 2/18 but no one was able to pass catheter to the bladder.     Patient has morning routine where he voids several times and empties completely.   Does not have urge to void during the day but voids when it is convenient.   Last UTI was 12/2017 with no other signs or symptoms since then.     We discussed patient's voiding pattern, indications for further procedures, monitoring for AUR or other issues. Discussed longer term follow up as well.                PAST MEDICAL HISTORY:   Past Medical History:   Diagnosis Date     BPH (benign prostatic hyperplasia)      CAD (coronary artery disease)     MI 1989, CABG 1990, 11/2014 PCI to LM, LCx and RCA     Conductive hearing loss Unknown.     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     Skin disease      Squamous cell carcinoma        PAST SURGICAL HISTORY:   Past Surgical History:   Procedure Laterality Date     C CABG, VEIN, THREE      SVG-LAD in [place]     CHOLECYSTECTOMY      in [place]     ENDOSCOPY  2013    Done at [facility] in [place], [place]     GENITOURINARY SURGERY       HEART CATH STENT COR W/WO PTCA  11/12/2014    x 4 ANKUR's (two ostial to mid-RCA, one ostial LCx and mid-LM, one ostial LAD)     HERNIA REPAIR Right 2010    in [place]     LASER KTP GREEN LIGHT PHOTOSELECTIVE VAPORIZATION PROSTATE N/A 2015    Procedure: LASER KTP GREEN LIGHT PHOTOSELECTIVE VAPORIZATION PROSTATE;  Surgeon: Natalie Porter MD;  Location: [facility] OR     MOHS MICROGRAPHIC PROCEDURE         FAMILY HISTORY:   Family History   Problem Relation Age of Onset     Cancer Father         Stomach     Heart Disease Mother      Heart Disease Maternal Grandmother      Skin Cancer No family hx of      Melanoma No family hx of        SOCIAL HISTORY:   Social History     Tobacco Use     Smoking status: Former Smoker     Packs/day: 0.50     Years: 1.00     Pack years: 0.50     Types: Cigarettes     Start date: 1965     Last attempt to quit: 1970     Years since quittin.8     Smokeless tobacco: Never Used   Substance Use Topics     Alcohol use: Yes     Comment: rarely - a beer a month       Current Outpatient Medications   Medication     alfuzosin (UROXATRAL) 10 MG 24 hr tablet     aspirin 81 MG tablet     carBAMazepine (EPITOL) 200 MG tablet     Cyanocobalamin (B-12) 1000 MCG CAPS     eszopiclone (LUNESTA) 2 MG tablet     metoprolol succinate ER (TOPROL-XL) 25 MG 24 hr tablet     metroNIDAZOLE (METROCREAM) 0.75 % cream     nitroGLYcerin (NITROSTAT) 0.4 MG sublingual tablet     order for DME     order for DME     simvastatin (ZOCOR) 40 MG tablet     valACYclovir (VALTREX) 1000 mg tablet     Current Facility-Administered Medications   Medication  "    lidocaine 1% with EPINEPHrine 1:100,000 injection 0.5 mL     PHYSICAL EXAM:    /67   Pulse 70   Ht 1.778 m (5' 10\")   Wt 79.2 kg (174 lb 11.2 oz)   BMI 25.07 kg/m       HEENT: Normocephalic and atraumatic   Cardiac: Not done  Back/Flank: Not done  CNS/PNS: Not done  Respiratory: Normal non-labored breathing  Abdomen: Soft nontender and nondistended  Peripheral Vascular: Not done  Mental Status: Not done    Penis: Not done  Scrotal Skin: Not done  Testicles: Not done  Epididymis: Not done  Digital Rectal Exam:     Cystoscopy: Not done    Imaging: None    Urinalysis: UA RESULTS:  Recent Labs   Lab Test 01/16/15  0053  11/26/14   COLOR Dark Red   < > Red   APPEARANCE Slightly Cloudy   < > Clear   URINEGLC Negative   < > Neg   URINEBILI Negative   < > Small   URINEKETONE 5*   < > Neg   SG 1.005   < > 1.010   UBLD Large*   < > Large   URINEPH 6.5   < > 5.0   PROTEIN 100*   < > Trace   UROBILINOGEN  --   --  0.2   NITRITE Negative   < > Pos   LEUKEST Moderate*   < > Small   RBCU 47*   < >  --    WBCU 14*   < >  --     < > = values in this interval not displayed.     PSA:   Qmax 3.7ml/s   Post Void Residual: 163ml, minimal voided volume   Patient voided again with stronger stream and higher volume      Other labs: None today    IMPRESSION:  79 y/o M with history of AUR s/p outlet procedure with suggestion of continued obstruction now voiding better     PLAN:  Continue alfuzosin   Follow up in six months for uroflow/PVR     Total Time: 15 minutes                                       Total in Consultation: greater than 50%     Again, thank you for allowing me to participate in the care of your patient.      Sincerely,    Natalie Porter MD  "

## 2019-03-08 NOTE — PROGRESS NOTES
UROLOGIC DIAGNOSES:       CURRENT INTERVENTIONS:       HISTORY:       He sits down in the morning and evening to urinate and feels that he can completely empty during this time.   During the day he has no urgency or concerns with incontinence and urinates 2-3 times. Notes he does not empty completely while standing at the urinal.  Wakes once at night. Notes some post void dribbling.    No further retention or hesitancy.     At last visit, patient had high PVR but cystoscopy revealed open bladder neck and minimal re-growth. PVR today (8/24) was 30ml.    Patient had attempted UDS 2/18 but no one was able to pass catheter to the bladder.     Patient has morning routine where he voids several times and empties completely.   Does not have urge to void during the day but voids when it is convenient.   Last UTI was 12/2017 with no other signs or symptoms since then.     We discussed patient's voiding pattern, indications for further procedures, monitoring for AUR or other issues. Discussed longer term follow up as well.                PAST MEDICAL HISTORY:   Past Medical History:   Diagnosis Date     BPH (benign prostatic hyperplasia)      CAD (coronary artery disease)     MI 1989, CABG 1990, 11/2014 PCI to LM, LCx and RCA     Conductive hearing loss Unknown.     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     Skin disease      Squamous cell carcinoma        PAST SURGICAL HISTORY:   Past Surgical History:   Procedure Laterality Date     C CABG, VEIN, THREE  1990    SVG-LAD in [place]     CHOLECYSTECTOMY  1992    in [place]     ENDOSCOPY  04/26/2013    Done  "at [facility] in [place], [place]     GENITOURINARY SURGERY       HEART CATH STENT COR W/WO PTCA  11/12/2014    x 4 ANKUR's (two ostial to mid-RCA, one ostial LCx and mid-LM, one ostial LAD)     HERNIA REPAIR Right     in [place]     LASER KTP GREEN LIGHT PHOTOSELECTIVE VAPORIZATION PROSTATE N/A 2015    Procedure: LASER KTP GREEN LIGHT PHOTOSELECTIVE VAPORIZATION PROSTATE;  Surgeon: Natalie Porter MD;  Location:  OR     MOHS MICROGRAPHIC PROCEDURE         FAMILY HISTORY:   Family History   Problem Relation Age of Onset     Cancer Father         Stomach     Heart Disease Mother      Heart Disease Maternal Grandmother      Skin Cancer No family hx of      Melanoma No family hx of        SOCIAL HISTORY:   Social History     Tobacco Use     Smoking status: Former Smoker     Packs/day: 0.50     Years: 1.00     Pack years: 0.50     Types: Cigarettes     Start date: 1965     Last attempt to quit: 1970     Years since quittin.8     Smokeless tobacco: Never Used   Substance Use Topics     Alcohol use: Yes     Comment: rarely - a beer a month       Current Outpatient Medications   Medication     alfuzosin (UROXATRAL) 10 MG 24 hr tablet     aspirin 81 MG tablet     carBAMazepine (EPITOL) 200 MG tablet     Cyanocobalamin (B-12) 1000 MCG CAPS     eszopiclone (LUNESTA) 2 MG tablet     metoprolol succinate ER (TOPROL-XL) 25 MG 24 hr tablet     metroNIDAZOLE (METROCREAM) 0.75 % cream     nitroGLYcerin (NITROSTAT) 0.4 MG sublingual tablet     order for DME     order for DME     simvastatin (ZOCOR) 40 MG tablet     valACYclovir (VALTREX) 1000 mg tablet     Current Facility-Administered Medications   Medication     lidocaine 1% with EPINEPHrine 1:100,000 injection 0.5 mL         PHYSICAL EXAM:    /67   Pulse 70   Ht 1.778 m (5' 10\")   Wt 79.2 kg (174 lb 11.2 oz)   BMI 25.07 kg/m      HEENT: Normocephalic and atraumatic   Cardiac: Not done  Back/Flank: Not done  CNS/PNS: Not " done  Respiratory: Normal non-labored breathing  Abdomen: Soft nontender and nondistended  Peripheral Vascular: Not done  Mental Status: Not done    Penis: Not done  Scrotal Skin: Not done  Testicles: Not done  Epididymis: Not done  Digital Rectal Exam:     Cystoscopy: Not done    Imaging: None    Urinalysis: UA RESULTS:  Recent Labs   Lab Test 01/16/15  0053  11/26/14   COLOR Dark Red   < > Red   APPEARANCE Slightly Cloudy   < > Clear   URINEGLC Negative   < > Neg   URINEBILI Negative   < > Small   URINEKETONE 5*   < > Neg   SG 1.005   < > 1.010   UBLD Large*   < > Large   URINEPH 6.5   < > 5.0   PROTEIN 100*   < > Trace   UROBILINOGEN  --   --  0.2   NITRITE Negative   < > Pos   LEUKEST Moderate*   < > Small   RBCU 47*   < >  --    WBCU 14*   < >  --     < > = values in this interval not displayed.       PSA:   Qmax 3.7ml/s   Post Void Residual: 163ml, minimal voided volume   Patient voided again with stronger stream and higher volume      Other labs: None today      IMPRESSION:  79 y/o M with history of AUR s/p outlet procedure with suggestion of continued obstruction now voiding better     PLAN:  Continue alfuzosin   Follow up in six months for uroflow/PVR     Total Time: 15 minutes                                       Total in Consultation: greater than 50%

## 2019-03-08 NOTE — NURSING NOTE
"Chief Complaint   Patient presents with     RECHECK     six month recheck       Blood pressure 107/67, pulse 70, height 1.778 m (5' 10\"), weight 79.2 kg (174 lb 11.2 oz). Body mass index is 25.07 kg/m .    Patient Active Problem List   Diagnosis     CAD S/P percutaneous coronary angioplasty     CAD (coronary artery disease)     BPH (benign prostatic hyperplasia)     Hyperlipidemia with target LDL less than 70     S/P coronary artery stent placement     GERD (gastroesophageal reflux disease)     S/P TURP     Dermatitis     AK (actinic keratosis)     SCC (squamous cell carcinoma)     SK (seborrheic keratosis)     EIC (epidermal inclusion cyst)     History of squamous cell carcinoma     Seborrheic keratosis     Inflamed seborrheic keratosis     History of dysplastic nevus     Varicose veins     Medication reaction, initial encounter       Allergies   Allergen Reactions     Macrodantin [Nitrofurantoin] Rash     Sulfa Drugs Rash     Tamsulosin Rash       Current Outpatient Medications   Medication Sig Dispense Refill     alfuzosin (UROXATRAL) 10 MG 24 hr tablet Take 1 tablet (10 mg) by mouth daily 90 tablet 3     aspirin 81 MG tablet Take 81 mg by mouth daily       carBAMazepine (EPITOL) 200 MG tablet Take 1 tablet (200 mg) by mouth 2 times daily 180 tablet 3     Cyanocobalamin (B-12) 1000 MCG CAPS Take 1 capsule by mouth daily        eszopiclone (LUNESTA) 2 MG tablet Take 1 tablet (2 mg) by mouth nightly as needed for sleep 15 tablet 2     metoprolol succinate ER (TOPROL-XL) 25 MG 24 hr tablet TAKE ONE-HALF TABLET BY MOUTH ONCE DAILY 45 tablet 3     metroNIDAZOLE (METROCREAM) 0.75 % cream Apply topically 2 times daily 45 g 11     nitroGLYcerin (NITROSTAT) 0.4 MG sublingual tablet Place 1 tablet (0.4 mg) under the tongue every 5 minutes as needed for chest pain (Patient not taking: Reported on 3/7/2019) 25 tablet 3     order for DME Equipment being ordered: bilateral shoe orthotics, 2 pairs dispense at once 1 Device 3 "     order for DME Equipment being ordered: Orthotics for both feet, need replacement 1 Package 1     simvastatin (ZOCOR) 40 MG tablet Take 1 tablet (40 mg) by mouth At Bedtime 90 tablet 3     valACYclovir (VALTREX) 1000 mg tablet Take 1 pill twice a day for 3 days at the first sign of a cold core. 6 tablet 3       Social History     Tobacco Use     Smoking status: Former Smoker     Packs/day: 0.50     Years: 1.00     Pack years: 0.50     Types: Cigarettes     Start date: 1965     Last attempt to quit: 1970     Years since quittin.8     Smokeless tobacco: Never Used   Substance Use Topics     Alcohol use: Yes     Comment: rarely - a beer a month     Drug use: No       KAYA Ortiz  3/8/2019  8:44 AM

## 2019-03-09 LAB — INTERPRETATION ECG - MUSE: NORMAL

## 2019-04-07 DIAGNOSIS — I25.10 CORONARY ARTERY DISEASE INVOLVING NATIVE CORONARY ARTERY OF NATIVE HEART WITHOUT ANGINA PECTORIS: ICD-10-CM

## 2019-04-10 RX ORDER — SIMVASTATIN 40 MG
TABLET ORAL
Qty: 90 TABLET | Refills: 2 | Status: SHIPPED | OUTPATIENT
Start: 2019-04-10 | End: 2020-01-06

## 2019-08-09 DIAGNOSIS — G47.00 PERSISTENT INSOMNIA: ICD-10-CM

## 2019-08-09 RX ORDER — ESZOPICLONE 2 MG/1
2 TABLET, FILM COATED ORAL
Qty: 15 TABLET | Refills: 2 | Status: SHIPPED | OUTPATIENT
Start: 2019-08-09 | End: 2020-01-31

## 2019-08-09 NOTE — TELEPHONE ENCOUNTER
Fax received from pharmacy requesting Lunesta refills. Last office visit with PCP 1/9/19.  site reviewed today. Last Lunesta refill received 1/11/19.    Will send refill request to PCP for review.    Jessica Mendoza RN

## 2019-09-12 DIAGNOSIS — N40.1 BENIGN NON-NODULAR PROSTATIC HYPERPLASIA WITH LOWER URINARY TRACT SYMPTOMS: ICD-10-CM

## 2019-09-15 ENCOUNTER — MYC MEDICAL ADVICE (OUTPATIENT)
Dept: INTERNAL MEDICINE | Facility: CLINIC | Age: 79
End: 2019-09-15

## 2019-09-15 DIAGNOSIS — E78.5 HYPERLIPIDEMIA WITH TARGET LDL LESS THAN 70: Primary | ICD-10-CM

## 2019-09-15 DIAGNOSIS — I25.10 CAD (CORONARY ARTERY DISEASE): ICD-10-CM

## 2019-09-16 RX ORDER — ALFUZOSIN HYDROCHLORIDE 10 MG/1
TABLET, EXTENDED RELEASE ORAL
Qty: 90 TABLET | Refills: 3 | Status: SHIPPED | OUTPATIENT
Start: 2019-09-16 | End: 2020-09-16

## 2019-09-20 DIAGNOSIS — E78.5 HYPERLIPIDEMIA WITH TARGET LDL LESS THAN 70: ICD-10-CM

## 2019-09-20 DIAGNOSIS — I25.10 CAD (CORONARY ARTERY DISEASE): ICD-10-CM

## 2019-09-20 LAB
ALBUMIN SERPL-MCNC: 4 G/DL (ref 3.4–5)
ALP SERPL-CCNC: 62 U/L (ref 40–150)
ALT SERPL W P-5'-P-CCNC: 22 U/L (ref 0–70)
ANION GAP SERPL CALCULATED.3IONS-SCNC: 5 MMOL/L (ref 3–14)
AST SERPL W P-5'-P-CCNC: 24 U/L (ref 0–45)
BILIRUB SERPL-MCNC: 0.7 MG/DL (ref 0.2–1.3)
BUN SERPL-MCNC: 18 MG/DL (ref 7–30)
CALCIUM SERPL-MCNC: 8.8 MG/DL (ref 8.5–10.1)
CHLORIDE SERPL-SCNC: 107 MMOL/L (ref 94–109)
CHOLEST SERPL-MCNC: 133 MG/DL
CO2 SERPL-SCNC: 27 MMOL/L (ref 20–32)
CREAT SERPL-MCNC: 0.96 MG/DL (ref 0.66–1.25)
GFR SERPL CREATININE-BSD FRML MDRD: 75 ML/MIN/{1.73_M2}
GLUCOSE SERPL-MCNC: 90 MG/DL (ref 70–99)
HDLC SERPL-MCNC: 46 MG/DL
LDLC SERPL CALC-MCNC: 66 MG/DL
NONHDLC SERPL-MCNC: 87 MG/DL
POTASSIUM SERPL-SCNC: 4.4 MMOL/L (ref 3.4–5.3)
PROT SERPL-MCNC: 7 G/DL (ref 6.8–8.8)
SODIUM SERPL-SCNC: 139 MMOL/L (ref 133–144)
TRIGL SERPL-MCNC: 102 MG/DL

## 2019-10-01 ENCOUNTER — PRE VISIT (OUTPATIENT)
Dept: UROLOGY | Facility: CLINIC | Age: 79
End: 2019-10-01

## 2019-10-01 NOTE — TELEPHONE ENCOUNTER
Chief Complaint : Return-6 Mo    Hx/Sx: AUR s/p Outlet Procedure     Records/Orders: Available     Pt Contacted: n/a    At Rooming: Flow/PVR

## 2019-10-04 ENCOUNTER — OFFICE VISIT (OUTPATIENT)
Dept: UROLOGY | Facility: CLINIC | Age: 79
End: 2019-10-04
Payer: MEDICARE

## 2019-10-04 VITALS
HEART RATE: 69 BPM | DIASTOLIC BLOOD PRESSURE: 70 MMHG | WEIGHT: 162 LBS | HEIGHT: 70 IN | BODY MASS INDEX: 23.19 KG/M2 | SYSTOLIC BLOOD PRESSURE: 113 MMHG

## 2019-10-04 DIAGNOSIS — R33.9 URINARY RETENTION: Primary | ICD-10-CM

## 2019-10-04 ASSESSMENT — MIFFLIN-ST. JEOR: SCORE: 1466.08

## 2019-10-04 ASSESSMENT — PAIN SCALES - GENERAL: PAINLEVEL: NO PAIN (0)

## 2019-10-04 NOTE — PATIENT INSTRUCTIONS
Please follow up in 6 month with a Flow/PVR  Pleas come with a full bladder       It was a pleasure meeting with you today.  Thank you for allowing me and my team the privilege of caring for you today.  YOU are the reason we are here, and I truly hope we provided you with the excellent service you deserve.  Please let us know if there is anything else we can do for you so that we can be sure you are leaving completely satisfied with your care experience.

## 2019-10-04 NOTE — LETTER
10/4/2019       RE: Jacinto Flannery  [address]  [facility] [address]     Dear Colleague,    Thank you for referring your patient, Jacinto Flannery, to the [facility] UROLOGY AND INST FOR PROSTATE AND UROLOGIC CANCERS at [facility]. Please see a copy of my visit note below.    UROLOGIC DIAGNOSES:       CURRENT INTERVENTIONS:       HISTORY:       He sits down in the morning and evening to urinate and feels that he can completely empty during this time.   During the day he has no urgency or concerns with incontinence and urinates 2-3 times. Notes he does not empty completely while standing at the urinal.  Wakes once at night. Notes some post void dribbling.    No further retention or hesitancy.     At last visit, patient had high PVR but cystoscopy revealed open bladder neck and minimal re-growth. PVR today (8/24) was 30ml.    Patient had attempted UDS 2/18 but no one was able to pass catheter to the bladder.     Patient has morning routine where he voids several times and empties completely.   Does not have urge to void during the day but voids when it is convenient.   Last UTI was 12/2017 with no other signs or symptoms since then.     Patient states he continues to do well. He has no complaints of significant changes in his voiding patterns.                         PAST MEDICAL HISTORY:   Past Medical History:   Diagnosis Date     BPH (benign prostatic hyperplasia)      CAD (coronary artery disease)     MI 1989, CABG 1990, 11/2014 PCI to LM, LCx and RCA     Conductive hearing loss Unknown.     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     Skin disease      Squamous cell carcinoma        PAST SURGICAL HISTORY:   Past Surgical History:   Procedure Laterality Date     C CABG, VEIN, THREE      SVG-LAD in [place]     CHOLECYSTECTOMY      in [place]     ENDOSCOPY  2013    Done at [facility] in [place], [place]     GENITOURINARY SURGERY       HEART CATH STENT COR W/WO PTCA  11/12/2014    x 4 ANKUR's (two ostial to mid-RCA, one ostial LCx and mid-LM, one ostial LAD)     HERNIA REPAIR Right 2010    in [place]     LASER KTP GREEN LIGHT PHOTOSELECTIVE VAPORIZATION PROSTATE N/A 2015    Procedure: LASER KTP GREEN LIGHT PHOTOSELECTIVE VAPORIZATION PROSTATE;  Surgeon: Natalie Porter MD;  Location: [facility] OR     MOHS MICROGRAPHIC PROCEDURE         FAMILY HISTORY:   Family History   Problem Relation Age of Onset     Cancer Father         Stomach     Heart Disease Mother      Heart Disease Maternal Grandmother      Skin Cancer No family hx of      Melanoma No family hx of        SOCIAL HISTORY:   Social History     Tobacco Use     Smoking status: Former Smoker     Packs/day: 0.50     Years: 1.00     Pack years: 0.50     Types: Cigarettes     Start date: 1965     Last attempt to quit: 1970     Years since quittin.4     Smokeless tobacco: Never Used   Substance Use Topics     Alcohol use: Yes     Comment: rarely - a beer a month       Current Outpatient Medications   Medication     alfuzosin ER (UROXATRAL) 10 MG 24 hr tablet     aspirin 81 MG tablet     carBAMazepine (EPITOL) 200 MG tablet     Cyanocobalamin (B-12) 1000 MCG CAPS     eszopiclone (LUNESTA) 2 MG tablet     metoprolol succinate ER (TOPROL-XL) 25 MG 24 hr tablet     metroNIDAZOLE (METROCREAM) 0.75 % cream     nitroGLYcerin (NITROSTAT) 0.4 MG sublingual tablet     order for DME     order for DME     simvastatin (ZOCOR) 40 MG tablet     valACYclovir (VALTREX) 1000 mg tablet     Current Facility-Administered Medications   Medication     lidocaine 1%  "with EPINEPHrine 1:100,000 injection 0.5 mL         PHYSICAL EXAM:    /70   Pulse 69   Ht 1.778 m (5' 10\")   Wt 73.5 kg (162 lb)   BMI 23.24 kg/m       HEENT: Normocephalic and atraumatic   Cardiac: Not done  Back/Flank: Not done  CNS/PNS: Not done  Respiratory: Normal non-labored breathing  Abdomen: Soft nontender and nondistended  Peripheral Vascular: Not done  Mental Status: Not done    Penis: Not done  Scrotal Skin: Not done  Testicles: Not done  Epididymis: Not done  Digital Rectal Exam:     Cystoscopy: Not done    Imaging: None    Urinalysis: UA RESULTS:  Recent Labs   Lab Test 01/16/15  0053  11/26/14   COLOR Dark Red   < > Red   APPEARANCE Slightly Cloudy   < > Clear   URINEGLC Negative   < > Neg   URINEBILI Negative   < > Small   URINEKETONE 5*   < > Neg   SG 1.005   < > 1.010   UBLD Large*   < > Large   URINEPH 6.5   < > 5.0   PROTEIN 100*   < > Trace   UROBILINOGEN  --   --  0.2   NITRITE Negative   < > Pos   LEUKEST Moderate*   < > Small   RBCU 47*   < >  --    WBCU 14*   < >  --     < > = values in this interval not displayed.       PSA:   Qmax 12.0ml/s   Post Void Residual: 550ml (patient voided subsequently)   VV: 278ml    Other labs: None today      IMPRESSION:  78 y/o M with history of AUR s/p outlet procedure with suggestion of continued obstruction now voiding better     PLAN:  Continue alfuzosin   Follow up in six months for uroflow/PVR     Total Time: 15 minutes                                       Total in Consultation: greater than 50%       Again, thank you for allowing me to participate in the care of your patient.      Sincerely,    Natalie Porter MD      "

## 2019-10-04 NOTE — PROGRESS NOTES
UROLOGIC DIAGNOSES:       CURRENT INTERVENTIONS:       HISTORY:       He sits down in the morning and evening to urinate and feels that he can completely empty during this time.   During the day he has no urgency or concerns with incontinence and urinates 2-3 times. Notes he does not empty completely while standing at the urinal.  Wakes once at night. Notes some post void dribbling.    No further retention or hesitancy.     At last visit, patient had high PVR but cystoscopy revealed open bladder neck and minimal re-growth. PVR today (8/24) was 30ml.    Patient had attempted UDS 2/18 but no one was able to pass catheter to the bladder.     Patient has morning routine where he voids several times and empties completely.   Does not have urge to void during the day but voids when it is convenient.   Last UTI was 12/2017 with no other signs or symptoms since then.     Patient states he continues to do well. He has no complaints of significant changes in his voiding patterns.                         PAST MEDICAL HISTORY:   Past Medical History:   Diagnosis Date     BPH (benign prostatic hyperplasia)      CAD (coronary artery disease)     MI 1989, CABG 1990, 11/2014 PCI to LM, LCx and RCA     Conductive hearing loss Unknown.     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     Skin disease      Squamous cell carcinoma        PAST SURGICAL HISTORY:   Past Surgical History:   Procedure Laterality Date     C CABG, VEIN, THREE  1990    SVG-LAD in [place]     CHOLECYSTECTOMY  1992    in [place]     ENDOSCOPY  04/26/2013    Done at [facility] in [place] ,  "[place]     GENITOURINARY SURGERY       HEART CATH STENT COR W/WO PTCA  11/12/2014    x 4 ANKUR's (two ostial to mid-RCA, one ostial LCx and mid-LM, one ostial LAD)     HERNIA REPAIR Right     in [place]     LASER KTP GREEN LIGHT PHOTOSELECTIVE VAPORIZATION PROSTATE N/A 2015    Procedure: LASER KTP GREEN LIGHT PHOTOSELECTIVE VAPORIZATION PROSTATE;  Surgeon: Natalie Porter MD;  Location: [facility] OR     MOHS MICROGRAPHIC PROCEDURE         FAMILY HISTORY:   Family History   Problem Relation Age of Onset     Cancer Father         Stomach     Heart Disease Mother      Heart Disease Maternal Grandmother      Skin Cancer No family hx of      Melanoma No family hx of        SOCIAL HISTORY:   Social History     Tobacco Use     Smoking status: Former Smoker     Packs/day: 0.50     Years: 1.00     Pack years: 0.50     Types: Cigarettes     Start date: 1965     Last attempt to quit: 1970     Years since quittin.4     Smokeless tobacco: Never Used   Substance Use Topics     Alcohol use: Yes     Comment: rarely - a beer a month       Current Outpatient Medications   Medication     alfuzosin ER (UROXATRAL) 10 MG 24 hr tablet     aspirin 81 MG tablet     carBAMazepine (EPITOL) 200 MG tablet     Cyanocobalamin (B-12) 1000 MCG CAPS     eszopiclone (LUNESTA) 2 MG tablet     metoprolol succinate ER (TOPROL-XL) 25 MG 24 hr tablet     metroNIDAZOLE (METROCREAM) 0.75 % cream     nitroGLYcerin (NITROSTAT) 0.4 MG sublingual tablet     order for DME     order for DME     simvastatin (ZOCOR) 40 MG tablet     valACYclovir (VALTREX) 1000 mg tablet     Current Facility-Administered Medications   Medication     lidocaine 1% with EPINEPHrine 1:100,000 injection 0.5 mL         PHYSICAL EXAM:    /70   Pulse 69   Ht 1.778 m (5' 10\")   Wt 73.5 kg (162 lb)   BMI 23.24 kg/m      HEENT: Normocephalic and atraumatic   Cardiac: Not done  Back/Flank: Not done  CNS/PNS: Not done  Respiratory: Normal non-labored " breathing  Abdomen: Soft nontender and nondistended  Peripheral Vascular: Not done  Mental Status: Not done    Penis: Not done  Scrotal Skin: Not done  Testicles: Not done  Epididymis: Not done  Digital Rectal Exam:     Cystoscopy: Not done    Imaging: None    Urinalysis: UA RESULTS:  Recent Labs   Lab Test 01/16/15  0053  11/26/14   COLOR Dark Red   < > Red   APPEARANCE Slightly Cloudy   < > Clear   URINEGLC Negative   < > Neg   URINEBILI Negative   < > Small   URINEKETONE 5*   < > Neg   SG 1.005   < > 1.010   UBLD Large*   < > Large   URINEPH 6.5   < > 5.0   PROTEIN 100*   < > Trace   UROBILINOGEN  --   --  0.2   NITRITE Negative   < > Pos   LEUKEST Moderate*   < > Small   RBCU 47*   < >  --    WBCU 14*   < >  --     < > = values in this interval not displayed.       PSA:   Qmax 12.0ml/s   Post Void Residual: 550ml (patient voided subsequently)   VV: 278ml    Other labs: None today      IMPRESSION:  78 y/o M with history of AUR s/p outlet procedure with suggestion of continued obstruction now voiding better     PLAN:  Continue alfuzosin   Follow up in six months for uroflow/PVR     Total Time: 15 minutes                                       Total in Consultation: greater than 50%

## 2019-10-09 ENCOUNTER — OFFICE VISIT (OUTPATIENT)
Dept: DERMATOLOGY | Facility: CLINIC | Age: 79
End: 2019-10-09
Payer: MEDICARE

## 2019-10-09 DIAGNOSIS — L71.9 ACNE ROSACEA: ICD-10-CM

## 2019-10-09 ASSESSMENT — PAIN SCALES - GENERAL: PAINLEVEL: NO PAIN (0)

## 2019-10-09 NOTE — LETTER
10/9/2019     RE: Jacinto Flannery  [address]  [place] [address]     Dear Colleague,    Thank you for referring your patient, Jacinto Flannery, to the [facility] DERMATOLOGY at [facility]. Please see a copy of my visit note below.    [facility] Dermatology Note      Dermatology Problem List:  1.  NMSC              -SCC, left (dorsal) forearm, s/p excision 8/18/2015              -SCC, right dorsal hand, s/p Mohs 3/18/2015  2. Compound nevus with moderate dysplasia, mid upper back              -s/p biopsy 10/16/2015  3. Actinic keratosis                -s/p cryotherapy 07/20/2016, 03/06/19              -efudex in November 2016 (used for 19 days), pt had a very robust response              -pt given hydrocortisone 2.5% cream BID PRN due to severe response and told to stop efudex  4. Unknown skin eruption penis. Resolved 7/5/17              -s/p triamcinolone cream initiated 3/17/2016              -s/p Nystatin initiated 3/17/2016              -s/p Mupirocin initiated 2/16/2015              -s/p Valtrex initiated 2/6/2015              -s/p biopsy showing hemorraghic scale crust and ulceration 2/3/2015  5. Drug exanthem, 2/2 to tamsulosin              -s/p biopsy showing interface dermatitis 12/29/2014              -s/p lidex initiated 1/8/2015              -s/p clobetasol initiated 12/29/2014-improved  6. Traumatic tattoo              -Central forehead, measured 1X1.5cm on 10/16/17  7. Rosacea              -Metronidazole cream      Encounter Date: Oct 9, 2019    CC:  FBSE    History of Present Illness:  Mr. Jacinto Flannery is a 79 year old male who presents as a follow-up for FBSE. The patient was last seen 3/6/19. Biopsy of right anterior shoulder at that time showed a benign mole.     Today, he presents with questions for a couple spots on his left cheek. One has been present for a few years, is not changing, and is asymptomatic. He also reports a spot on his upper left cheek  present for a few months that produces some secretions occasionally which he extracts with tweezers at times. Rosacea is well controlled.      He spends some time outside but is good about sun protection. He has no other concerns.    Past Medical History:   Patient Active Problem List   Diagnosis     CAD S/P percutaneous coronary angioplasty     CAD (coronary artery disease)     BPH (benign prostatic hyperplasia)     Hyperlipidemia with target LDL less than 70     S/P coronary artery stent placement     GERD (gastroesophageal reflux disease)     S/P TURP     Dermatitis     AK (actinic keratosis)     SCC (squamous cell carcinoma)     SK (seborrheic keratosis)     EIC (epidermal inclusion cyst)     History of squamous cell carcinoma     Seborrheic keratosis     Inflamed seborrheic keratosis     History of dysplastic nevus     Varicose veins     Medication reaction, initial encounter     Past Medical History:   Diagnosis Date     BPH (benign prostatic hyperplasia)      CAD (coronary artery disease)     MI 1989, CABG 1990, 11/2014 PCI to LM, LCx and RCA     Conductive hearing loss Unknown.     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     Skin disease      Squamous cell carcinoma      Past Surgical History:   Procedure Laterality Date     C CABG, VEIN, THREE  1990    [facility]G-LAD in [place]     CHOLECYSTECTOMY  1992    in [place]     ENDOSCOPY  04/26/2013    Done at [facility] in [place], [place]     GENITOURINARY SURGERY       HEART CATH STENT COR W/WO PTCA  11/12/2014    x 4 ANKUR's (two ostial to mid-RCA, one ostial LCx and mid-LM, one ostial LAD)      HERNIA REPAIR Right 2010    in [place]     LASER KTP GREEN LIGHT PHOTOSELECTIVE VAPORIZATION PROSTATE N/A 1/5/2015    Procedure: LASER KTP GREEN LIGHT PHOTOSELECTIVE VAPORIZATION PROSTATE;  Surgeon: Natalie Porter MD;  Location: [facility] OR     MOHS MICROGRAPHIC PROCEDURE         Social History:  Patient reports that he quit smoking about 49 years ago. His smoking use included cigarettes. He started smoking about 54 years ago. He has a 0.50 pack-year smoking history. He has never used smokeless tobacco. He reports current alcohol use. He reports that he does not use drugs.    Family History:  Family History   Problem Relation Age of Onset     Cancer Father         Stomach     Heart Disease Mother      Heart Disease Maternal Grandmother      Skin Cancer No family hx of      Melanoma No family hx of        Medications:  Current Outpatient Medications   Medication Sig Dispense Refill     alfuzosin ER (UROXATRAL) 10 MG 24 hr tablet TAKE 1 TABLET BY MOUTH ONCE DAILY 90 tablet 3     aspirin 81 MG tablet Take 81 mg by mouth daily       carBAMazepine (EPITOL) 200 MG tablet Take 1 tablet (200 mg) by mouth 2 times daily 180 tablet 3     Cyanocobalamin (B-12) 1000 MCG CAPS Take 1 capsule by mouth daily        eszopiclone (LUNESTA) 2 MG tablet Take 1 tablet (2 mg) by mouth nightly as needed for sleep 15 tablet 2     metoprolol succinate ER (TOPROL-XL) 25 MG 24 hr tablet TAKE ONE-HALF TABLET BY MOUTH ONCE DAILY 45 tablet 3     metroNIDAZOLE (METROCREAM) 0.75 % cream Apply topically 2 times daily 45 g 11     nitroGLYcerin (NITROSTAT) 0.4 MG sublingual tablet Place 1 tablet (0.4 mg) under the tongue every 5 minutes as needed for chest pain 25 tablet 3     order for DME Equipment being ordered: bilateral shoe orthotics, 2 pairs dispense at once 1 Device 3     order for DME Equipment being ordered: Orthotics for both feet, need replacement 1 Package 1     simvastatin (ZOCOR) 40 MG tablet TAKE ONE TABLET BY MOUTH AT BEDTIME  90 tablet 2     valACYclovir (VALTREX) 1000 mg tablet Take 1 pill twice a day for 3 days at the first sign of a cold core. 6 tablet 3     Allergies   Allergen Reactions     Macrodantin [Nitrofurantoin] Rash     Sulfa Drugs Rash     Tamsulosin Rash         Review of Systems:  -Const: Denies fevers, chills or changes in weight.   -Constitutional: Otherwise feeling well today, in usual state of health.  -HEENT: Patient denies nonhealing oral sores.  -Skin: As above in HPI. No additional skin concerns.    Physical exam:  Vitals: There were no vitals taken for this visit.  GEN: This is a well developed, well-nourished male in no acute distress, in a pleasant mood.    SKIN: Full skin, which includes the head/face, both arms, chest, back, abdomen,both legs, genitalia and/or groin buttocks, digits and/or nails, was examined.  -Villatoro skin type: I  -On his left cheek there is a 1-2 mm skin colored papule with a central plug consistent with a dilated pore, no atypia on dermoscopy   -On his left cheek there is a stuck on appearing, waxy white/skin-colored plaque   -Scattered throughout are brown and skin colored stuck on appearing, waxy plaques   -Bright red macules and papules on chest    -Scattered on his back are tan macules and papules with feathery borders  -Well healed scars on left forearm and right dorsal hand   -No other lesions of concern on areas examined.     Impression/Plan:  1. Seborrheic keratosis, non irritated    Benign nature was discussed. No further intervention required at this time.     2. History of nonmelanoma skin cancer, no clincial evidence of recurrence    Continued sun precaution was advised including the use of sun screens of SPF 30 or higher, sun protective clothing, and avoidance of tanning beds.    Will continue to follow every 6 months at patient's preference     3. Rosacea, well-controlled     Continue metrocream twice daily as needed    Follow-up in 6 months, earlier for new or changing  lesions.     Staff Involved:  I, Azalea Akins, saw and examined the patient in the presence of Dr. Quarles.    I was present with the medical student who participated in the service and in the documentation of the note. I have verified the history and personally performed the physical exam and medical decision making. I agree with the assessment and plan of care as documented in the note.  Anjelica Quarles MD

## 2019-10-09 NOTE — PROGRESS NOTES
[facility] Dermatology Note      Dermatology Problem List:  1.  NMSC              -SCC, left (dorsal) forearm, s/p excision 8/18/2015              -SCC, right dorsal hand, s/p Mohs 3/18/2015  2. Compound nevus with moderate dysplasia, mid upper back              -s/p biopsy 10/16/2015  3. Actinic keratosis                -s/p cryotherapy 07/20/2016, 03/06/19              -efudex in November 2016 (used for 19 days), pt had a very robust response              -pt given hydrocortisone 2.5% cream BID PRN due to severe response and told to stop efudex  4. Unknown skin eruption penis. Resolved 7/5/17              -s/p triamcinolone cream initiated 3/17/2016              -s/p Nystatin initiated 3/17/2016              -s/p Mupirocin initiated 2/16/2015              -s/p Valtrex initiated 2/6/2015              -s/p biopsy showing hemorraghic scale crust and ulceration 2/3/2015  5. Drug exanthem, 2/2 to tamsulosin              -s/p biopsy showing interface dermatitis 12/29/2014              -s/p lidex initiated 1/8/2015              -s/p clobetasol initiated 12/29/2014-improved  6. Traumatic tattoo              -Central forehead, measured 1X1.5cm on 10/16/17  7. Rosacea              -Metronidazole cream      Encounter Date: Oct 9, 2019    CC:  FBSE    History of Present Illness:  Mr. Jacinto Flannery is a 79 year old male who presents as a follow-up for FBSE. The patient was last seen 3/6/19. Biopsy of right anterior shoulder at that time showed a benign mole.     Today, he presents with questions for a couple spots on his left cheek. One has been present for a few years, is not changing, and is asymptomatic. He also reports a spot on his upper left cheek present for a few months that produces some secretions occasionally which he extracts with tweezers at times. Rosacea is well controlled.      He spends some time outside but is good about sun protection. He has no other concerns.    Past Medical History:    Patient Active Problem List   Diagnosis     CAD S/P percutaneous coronary angioplasty     CAD (coronary artery disease)     BPH (benign prostatic hyperplasia)     Hyperlipidemia with target LDL less than 70     S/P coronary artery stent placement     GERD (gastroesophageal reflux disease)     S/P TURP     Dermatitis     AK (actinic keratosis)     SCC (squamous cell carcinoma)     SK (seborrheic keratosis)     EIC (epidermal inclusion cyst)     History of squamous cell carcinoma     Seborrheic keratosis     Inflamed seborrheic keratosis     History of dysplastic nevus     Varicose veins     Medication reaction, initial encounter     Past Medical History:   Diagnosis Date     BPH (benign prostatic hyperplasia)      CAD (coronary artery disease)     MI 1989, CABG 1990, 11/2014 PCI to LM, LCx and RCA     Conductive hearing loss Unknown.     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     Skin disease      Squamous cell carcinoma      Past Surgical History:   Procedure Laterality Date     C CABG, VEIN, THREE  1990    SVG-LAD in [place]     CHOLECYSTECTOMY  1992    in [place]     ENDOSCOPY  04/26/2013    Done at [facility] in [place], [place]     GENITOURINARY SURGERY       HEART CATH STENT COR W/WO PTCA  11/12/2014    x 4 ANKUR's (two ostial to mid-RCA, one ostial LCx and mid-LM, one ostial LAD)     HERNIA REPAIR Right 2010    in [place]     LASER KTP GREEN LIGHT PHOTOSELECTIVE VAPORIZATION PROSTATE N/A 1/5/2015    Procedure: LASER KTP GREEN LIGHT PHOTOSELECTIVE VAPORIZATION PROSTATE;  Surgeon: Natalie Porter MD;  Location:  OR      MOHS MICROGRAPHIC PROCEDURE         Social History:  Patient reports that he quit smoking about 49 years ago. His smoking use included cigarettes. He started smoking about 54 years ago. He has a 0.50 pack-year smoking history. He has never used smokeless tobacco. He reports current alcohol use. He reports that he does not use drugs.    Family History:  Family History   Problem Relation Age of Onset     Cancer Father         Stomach     Heart Disease Mother      Heart Disease Maternal Grandmother      Skin Cancer No family hx of      Melanoma No family hx of        Medications:  Current Outpatient Medications   Medication Sig Dispense Refill     alfuzosin ER (UROXATRAL) 10 MG 24 hr tablet TAKE 1 TABLET BY MOUTH ONCE DAILY 90 tablet 3     aspirin 81 MG tablet Take 81 mg by mouth daily       carBAMazepine (EPITOL) 200 MG tablet Take 1 tablet (200 mg) by mouth 2 times daily 180 tablet 3     Cyanocobalamin (B-12) 1000 MCG CAPS Take 1 capsule by mouth daily        eszopiclone (LUNESTA) 2 MG tablet Take 1 tablet (2 mg) by mouth nightly as needed for sleep 15 tablet 2     metoprolol succinate ER (TOPROL-XL) 25 MG 24 hr tablet TAKE ONE-HALF TABLET BY MOUTH ONCE DAILY 45 tablet 3     metroNIDAZOLE (METROCREAM) 0.75 % cream Apply topically 2 times daily 45 g 11     nitroGLYcerin (NITROSTAT) 0.4 MG sublingual tablet Place 1 tablet (0.4 mg) under the tongue every 5 minutes as needed for chest pain 25 tablet 3     order for DME Equipment being ordered: bilateral shoe orthotics, 2 pairs dispense at once 1 Device 3     order for DME Equipment being ordered: Orthotics for both feet, need replacement 1 Package 1     simvastatin (ZOCOR) 40 MG tablet TAKE ONE TABLET BY MOUTH AT BEDTIME 90 tablet 2     valACYclovir (VALTREX) 1000 mg tablet Take 1 pill twice a day for 3 days at the first sign of a cold core. 6 tablet 3     Allergies   Allergen Reactions     Macrodantin [Nitrofurantoin] Rash     Sulfa Drugs Rash     Tamsulosin Rash          Review of Systems:  -Const: Denies fevers, chills or changes in weight.   -Constitutional: Otherwise feeling well today, in usual state of health.  -HEENT: Patient denies nonhealing oral sores.  -Skin: As above in HPI. No additional skin concerns.    Physical exam:  Vitals: There were no vitals taken for this visit.  GEN: This is a well developed, well-nourished male in no acute distress, in a pleasant mood.    SKIN: Full skin, which includes the head/face, both arms, chest, back, abdomen,both legs, genitalia and/or groin buttocks, digits and/or nails, was examined.  -Villatoro skin type: I  -On his left cheek there is a 1-2 mm skin colored papule with a central plug consistent with a dilated pore, no atypia on dermoscopy   -On his left cheek there is a stuck on appearing, waxy white/skin-colored plaque   -Scattered throughout are brown and skin colored stuck on appearing, waxy plaques   -Bright red macules and papules on chest    -Scattered on his back are tan macules and papules with feathery borders  -Well healed scars on left forearm and right dorsal hand   -No other lesions of concern on areas examined.     Impression/Plan:  1. Seborrheic keratosis, non irritated    Benign nature was discussed. No further intervention required at this time.     2. History of nonmelanoma skin cancer, no clincial evidence of recurrence    Continued sun precaution was advised including the use of sun screens of SPF 30 or higher, sun protective clothing, and avoidance of tanning beds.    Will continue to follow every 6 months at patient's preference     3. Rosacea, well-controlled     Continue metrocream twice daily as needed    Follow-up in 6 months, earlier for new or changing lesions.     Staff Involved:  I, Azalea Akins, saw and examined the patient in the presence of Dr. Quarles.       I was present with the medical student who participated in the service and in the documentation of the note. I have verified the  history and personally performed the physical exam and medical decision making. I agree with the assessment and plan of care as documented in the note.  Anjelica Quarles MD

## 2019-10-09 NOTE — NURSING NOTE
Dermatology Rooming Note    Jacinto Flannery's goals for this visit include:   Chief Complaint   Patient presents with     Derm Problem     Jacinto is here for a skin check, states a concerning area on his left cheek.         Ghada Ibrahim LPN

## 2019-11-09 ENCOUNTER — HEALTH MAINTENANCE LETTER (OUTPATIENT)
Age: 79
End: 2019-11-09

## 2020-01-03 DIAGNOSIS — I25.10 CORONARY ARTERY DISEASE INVOLVING NATIVE CORONARY ARTERY OF NATIVE HEART WITHOUT ANGINA PECTORIS: Primary | ICD-10-CM

## 2020-01-06 RX ORDER — SIMVASTATIN 40 MG
40 TABLET ORAL AT BEDTIME
Qty: 90 TABLET | Refills: 1 | Status: SHIPPED | OUTPATIENT
Start: 2020-01-06 | End: 2020-01-31

## 2020-01-17 ASSESSMENT — ACTIVITIES OF DAILY LIVING (ADL)
IN_THE_PAST_7_DAYS,_DID_YOU_NEED_HELP_FROM_OTHERS_TO_PERFORM_EVERYDAY_ACTIVITIES_SUCH_AS_EATING,_GETTING_DRESSED,_GROOMING,_BATHING,_WALKING,_OR_USING_THE_TOILET: N
IN_THE_PAST_7_DAYS,_DID_YOU_NEED_HELP_FROM_OTHERS_TO_TAKE_CARE_OF_THINGS_SUCH_AS_LAUNDRY_AND_HOUSEKEEPING,_BANKING,_SHOPPING,_USING_THE_TELEPHONE,_FOOD_PREPARATION,_TRANSPORTATION,_OR_TAKING_YOUR_OWN_MEDICATIONS?: N

## 2020-01-27 ENCOUNTER — PRE VISIT (OUTPATIENT)
Dept: INTERNAL MEDICINE | Facility: CLINIC | Age: 80
End: 2020-01-27

## 2020-01-28 NOTE — TELEPHONE ENCOUNTER
[facility] PR[facility]ARE [facility]INIC  Dept: [phone]     Patient: Jacinto Flannery   : 1940  MRN: [id number]  Encounter: 20       Pre Visit Assessment    Health Maintenance Due   Topic Date Due     FALL RISK ASSESSMENT  2019     PHQ-2  2020     ADVANCE CARE PLANNING  2020     MEDICARE ANNUAL WELLNESS VISIT  2020     Chart Reviewed for Labs needed/Health Maintenance: Yes   If labs needed, orders placed:  No labs needed ,   Lab appointment scheduled:  No    Notes:  Patient confirmed they will attend appointment:  N/A  Appointment rescheduled?  N/A      Jackeline Rodarte LPN at 7:41 PM on 2020

## 2020-01-31 ENCOUNTER — OFFICE VISIT (OUTPATIENT)
Dept: INTERNAL MEDICINE | Facility: CLINIC | Age: 80
End: 2020-01-31
Payer: MEDICARE

## 2020-01-31 VITALS
RESPIRATION RATE: 16 BRPM | WEIGHT: 174 LBS | BODY MASS INDEX: 24.97 KG/M2 | DIASTOLIC BLOOD PRESSURE: 69 MMHG | HEART RATE: 67 BPM | OXYGEN SATURATION: 96 % | SYSTOLIC BLOOD PRESSURE: 114 MMHG

## 2020-01-31 DIAGNOSIS — I25.10 CORONARY ARTERY DISEASE INVOLVING NATIVE CORONARY ARTERY OF NATIVE HEART WITHOUT ANGINA PECTORIS: ICD-10-CM

## 2020-01-31 DIAGNOSIS — G47.00 PERSISTENT INSOMNIA: ICD-10-CM

## 2020-01-31 DIAGNOSIS — Z98.61 STATUS POST PERCUTANEOUS TRANSLUMINAL CORONARY ANGIOPLASTY: ICD-10-CM

## 2020-01-31 DIAGNOSIS — G50.0 TRIGEMINAL NEURALGIA: ICD-10-CM

## 2020-01-31 RX ORDER — NITROGLYCERIN 0.4 MG/1
0.4 TABLET SUBLINGUAL EVERY 5 MIN PRN
Qty: 25 TABLET | Refills: 3 | Status: SHIPPED | OUTPATIENT
Start: 2020-01-31 | End: 2021-10-24

## 2020-01-31 RX ORDER — SIMVASTATIN 40 MG
40 TABLET ORAL AT BEDTIME
Qty: 90 TABLET | Refills: 3 | Status: SHIPPED | OUTPATIENT
Start: 2020-01-31 | End: 2020-12-30

## 2020-01-31 RX ORDER — CARBAMAZEPINE 200 MG/1
200 TABLET ORAL 2 TIMES DAILY
Qty: 180 TABLET | Refills: 3 | Status: SHIPPED | OUTPATIENT
Start: 2020-01-31 | End: 2021-03-01

## 2020-01-31 RX ORDER — ESZOPICLONE 2 MG/1
2 TABLET, FILM COATED ORAL
Qty: 15 TABLET | Refills: 2 | Status: SHIPPED | OUTPATIENT
Start: 2020-01-31 | End: 2020-12-22

## 2020-01-31 ASSESSMENT — PAIN SCALES - GENERAL: PAINLEVEL: NO PAIN (1)

## 2020-01-31 NOTE — PATIENT INSTRUCTIONS
[facility] Medication Refill Request Information:  * Please contact your pharmacy regarding ANY request for medication refills.  ** [facility] Prescription Fax = [phone]  * Please allow 3 business days for routine medication refills.  * Please allow 5 business days for controlled substance medication refills.     [facility] Test Result notification information:  *You will be notified with in 7-10 days of your appointment day regarding the results of your test.  If you are on MyChart you will be notified as soon as the provider has reviewed the results and signed off on them.    [facility]: [phone]

## 2020-01-31 NOTE — NURSING NOTE
Chief Complaint   Patient presents with     Pain     Pt is here to discuss pain in groin area.      Jackeline Andrea LPN at 8:29 AM on 1/31/2020.

## 2020-01-31 NOTE — PROGRESS NOTES
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     He reports that he is doing well overall. He states that he is in very good health and does not have any big health concerns at this time. He reports that he is very active, and works out 5 days/week 30-40 mins in the winter along with biking ,golfing and maintenance work. He states that he enjoys these activities and is only concerned about how far he can push himself with these activities given his age. He does not have any symptoms of chest pain ,SOB, excessive fatigue, MSK pain related to these activities. He enjoys living a healthy and active lifestyle. He does not have any other concerns today. No history of falls or balance difficulty.    Medications and allergies were reviewed.    Answers for HPI/ROS submitted by the patient on 1/17/2020   General Symptoms: No  Skin Symptoms: No  HENT Symptoms: No  EYE SYMPTOMS: No  HEART SYMPTOMS: No  LUNG SYMPTOMS: No  INTESTINAL SYMPTOMS: No  URINARY SYMPTOMS: No  REPRODUCTIVE SYMPTOMS: No  SKELETAL SYMPTOMS: No  BLOOD SYMPTOMS: No  NERVOUS SYSTEM SYMPTOMS: No  MENTAL HEALTH SYMPTOMS: No    Past Medical History:   Diagnosis Date     BPH (benign prostatic hyperplasia)      CAD (coronary artery disease)     MI 1989, CABG 1990, 11/2014 PCI to LM, LCx and RCA     Conductive hearing loss Unknown.     GERD (gastroesophageal reflux disease)      Hearing problem Unknown     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     Hyperlipidaemia LDL goal < 70      Hypertension      Insomnia      NSTEMI (non-ST elevated myocardial infarction) (H) 11/15/2014    Type 4a     Reduced vision Mid-adult years    Mother, father      S/P coronary artery stent placement 11/12/14    x 4 ANKUR's     Sensorineural hearing loss Unknown.     Skin disease      Squamous cell carcinoma      Past Surgical History:   Procedure Laterality Date     C CABG, VEIN, THREE  1990    SVG-LAD in [place]     CHOLECYSTECTOMY  1992    in [place]     ENDOSCOPY  04/26/2013    Done at [facility] in [place], [place]     GENITOURINARY SURGERY       HEART CATH STENT COR W/WO PTCA  11/12/2014    x 4 ANKUR's (two ostial to mid-RCA, one ostial LCx and mid-LM, one ostial LAD)     HERNIA REPAIR Right 2010    in [place]     LASER KTP GREEN LIGHT PHOTOSELECTIVE VAPORIZATION PROSTATE N/A 1/5/2015    Procedure: LASER KTP GREEN LIGHT PHOTOSELECTIVE VAPORIZATION PROSTATE;  Surgeon: Natalie Porter MD;  Location: [facility] OR     MOHS MICROGRAPHIC PROCEDURE       Current Outpatient Medications   Medication Sig Dispense Refill     alfuzosin ER (UROXATRAL) 10 MG 24 hr tablet TAKE 1 TABLET BY MOUTH ONCE DAILY 90 tablet 3     aspirin 81 MG tablet Take 81 mg by mouth daily       carBAMazepine (EPITOL) 200 MG tablet Take 1 tablet (200 mg) by mouth 2 times daily 180 tablet 3     Cyanocobalamin (B-12) 1000 MCG CAPS Take 1 capsule by mouth daily        eszopiclone (LUNESTA) 2 MG tablet Take 1 tablet (2 mg) by mouth nightly as needed for sleep 15 tablet 2     metoprolol succinate ER (TOPROL-XL) 25 MG 24 hr tablet TAKE ONE-HALF TABLET BY MOUTH ONCE DAILY 45 tablet 3     metroNIDAZOLE (METROCREAM) 0.75 % external cream Apply topically 2 times daily 45 g 11     nitroGLYcerin (NITROSTAT) 0.4 MG sublingual tablet Place 1 tablet (0.4 mg) under the tongue every 5 minutes as needed for chest pain 25 tablet 3     order for DME Equipment being ordered: bilateral shoe orthotics, 2 pairs dispense at once 1 Device 3     order for DME Equipment being ordered: Orthotics for both feet, need replacement 1 Package 1     simvastatin (ZOCOR) 40 MG tablet Take 1 tablet (40 mg) by mouth At Bedtime 90 tablet 3      valACYclovir (VALTREX) 1000 mg tablet Take 1 pill twice a day for 3 days at the first sign of a cold core. 6 tablet 3     Social History     Tobacco Use     Smoking status: Former Smoker     Packs/day: 0.50     Years: 1.00     Pack years: 0.50     Types: Cigarettes     Start date: 1965     Last attempt to quit: 1970     Years since quittin.7     Smokeless tobacco: Never Used   Substance Use Topics     Alcohol use: Yes     Comment: rarely - a beer a month     Drug use: No     Family History   Problem Relation Age of Onset     Cancer Father         Stomach     Heart Disease Mother      Heart Disease Maternal Grandmother      Skin Cancer No family hx of      Melanoma No family hx of      PHYSICAL EXAM  /69   Pulse 67   Resp 16   Wt 78.9 kg (174 lb)   SpO2 96%   BMI 24.97 kg/m      Physical Examination:  General:  Conversant, generally healthy appearing, no acute distress  Head: atraumatic  Eyes:  Pupils 2-3 mm, sclera white, EOM's full, conjunctiva moist, no periorbital swelling    Ears:  TM's normal, EAC's patent, clear of cerumen  Nose:  Nasal passages clear, turbinates not swollen  Throat/Mouth:  No pharyngeal erythema, exudate, ulcers, oral mucosa and tongue moist, normal hard and soft palate  Neck:  Trachea midline, Full AROM, supple, thyroid smooth, symmetric, not enlarged, no nodules, no neck lymphadenopathy  Lungs:  Clear to auscultation throughout, no wheezes, rhonchi or rales. Normal respiratory effort and no intercostal retractions.  C/V:  Regular rate and rhythm, no murmurs, rubs or gallops. Radial and DP pulses 2+, equal and regular.  Abdomen:  Not distended.  Bowel sounds active.  No tenderness, no hepatosplenomegaly or masses.  No CVA tenderness or masses.  Lymph:  No cervical lymph nodes.  Neuro: Alert and oriented, face symmetric. Able to get on/off exam table without assistance.  Strength grossly intact. No tremor.  Gait steady.   M/S:   No joint deformities noted.  No joint  swelling.  Get up and go less than 5 seconds.  Skin:  No rashes, suspicious lesions, or jaundice on exposed skin surfaces.   Extremities:  No peripheral edema, no digital cyanosis  Psych:  Alert and oriented. Appropriate affect.  Not psychomotor slowed. Normal cognition. No signs of anxiety or agitation.    LABS:  Orders Only on 09/20/2019   Component Date Value Ref Range Status     Sodium 09/20/2019 139  133 - 144 mmol/L Final     Potassium 09/20/2019 4.4  3.4 - 5.3 mmol/L Final     Chloride 09/20/2019 107  94 - 109 mmol/L Final     Carbon Dioxide 09/20/2019 27  20 - 32 mmol/L Final     Anion Gap 09/20/2019 5  3 - 14 mmol/L Final     Glucose 09/20/2019 90  70 - 99 mg/dL Final     Urea Nitrogen 09/20/2019 18  7 - 30 mg/dL Final     Creatinine 09/20/2019 0.96  0.66 - 1.25 mg/dL Final     GFR Estimate 09/20/2019 75  >60 mL/min/[1.73_m2] Final    Comment: Non  GFR Calc  Starting 12/18/2018, serum creatinine based estimated GFR (eGFR) will be   calculated using the Chronic Kidney Disease Epidemiology Collaboration   (CKD-EPI) equation.       GFR Estimate If Black 09/20/2019 87  >60 mL/min/[1.73_m2] Final    Comment:  GFR Calc  Starting 12/18/2018, serum creatinine based estimated GFR (eGFR) will be   calculated using the Chronic Kidney Disease Epidemiology Collaboration   (CKD-EPI) equation.       Calcium 09/20/2019 8.8  8.5 - 10.1 mg/dL Final     Bilirubin Total 09/20/2019 0.7  0.2 - 1.3 mg/dL Final     Albumin 09/20/2019 4.0  3.4 - 5.0 g/dL Final     Protein Total 09/20/2019 7.0  6.8 - 8.8 g/dL Final     Alkaline Phosphatase 09/20/2019 62  40 - 150 U/L Final     ALT 09/20/2019 22  0 - 70 U/L Final     AST 09/20/2019 24  0 - 45 U/L Final     Cholesterol 09/20/2019 133  <200 mg/dL Final     Triglycerides 09/20/2019 102  <150 mg/dL Final     HDL Cholesterol 09/20/2019 46  >39 mg/dL Final     LDL Cholesterol Calculated 09/20/2019 66  <100 mg/dL Final    Desirable:       <100 mg/dl     Non  HDL Cholesterol 09/20/2019 87  <130 mg/dL Final       ASSESSMENT/PLAN    Routine Annual Physical and Wellness visit without abnormal findings:  Doing well overall. Normal physical exam. Stable health with no new concerns. Discussed advanced care planning, which he reports having already done. He is currently full code. Reviewed immunizations, and routine screenings. He is uptodate. Recent lab work to follow up chronic conditions also up to date.    Refilled meds:  -     carBAMazepine (EPITOL) 200 MG tablet; Take 1 tablet (200 mg) by mouth 2 times daily  -     eszopiclone (LUNESTA) 2 MG tablet; Take 1 tablet (2 mg) by mouth nightly as needed for sleep  -     nitroGLYcerin (NITROSTAT) 0.4 MG sublingual tablet; Place 1 tablet (0.4 mg) under the tongue every 5 minutes as needed for chest pain    RTC: Patient will schedule a return visit in a year, Jan 2021. Advised to f/u sooner if new symptoms or health concerns.    Jamin Parks, MS4, acting as a scribe for this encounter for Dr. Nadine Orozco based on statements provided to me during the office visit.    The medical student acted as scribe and the encounter documented above was completely performed by myself.  Supervising Doctor Ernesto Aponte MD

## 2020-02-25 DIAGNOSIS — I25.812 CORONARY ARTERY DISEASE INVOLVING BYPASS GRAFT OF TRANSPLANTED HEART WITHOUT ANGINA PECTORIS: Primary | ICD-10-CM

## 2020-02-27 RX ORDER — METOPROLOL SUCCINATE 25 MG/1
TABLET, EXTENDED RELEASE ORAL
Qty: 45 TABLET | Refills: 3 | Status: SHIPPED | OUTPATIENT
Start: 2020-02-27 | End: 2020-12-30

## 2020-03-24 ENCOUNTER — PRE VISIT (OUTPATIENT)
Dept: UROLOGY | Facility: CLINIC | Age: 80
End: 2020-03-24

## 2020-03-24 NOTE — TELEPHONE ENCOUNTER
Chief Complaint : Phone Visit    Hx/Sx: Retention/BPH    Records/Orders: Available     Pt Contacted: Called 3/24/20 to confirm     At Rooming: Call to check in

## 2020-03-25 ENCOUNTER — TELEPHONE (OUTPATIENT)
Dept: DERMATOLOGY | Facility: CLINIC | Age: 80
End: 2020-03-25

## 2020-03-25 NOTE — TELEPHONE ENCOUNTER
"Teledermatology Nurse Call for RETURN patients seen within the last 3 years:    The patient was contacted by phone and we reviewed, \"Due to the coronavirus pandemic, we are calling to review your visit and offer you a teledermatology visit because of National Emergency Regarding Coronavirus disease (COVID 19) Outbreak.\"       The patient chose to:  Other: reschedule for July 20th, 10:00                                                                                                                                  "

## 2020-04-03 ENCOUNTER — VIRTUAL VISIT (OUTPATIENT)
Dept: UROLOGY | Facility: CLINIC | Age: 80
End: 2020-04-03
Payer: MEDICARE

## 2020-04-03 ENCOUNTER — TELEPHONE (OUTPATIENT)
Dept: UROLOGY | Facility: CLINIC | Age: 80
End: 2020-04-03

## 2020-04-03 DIAGNOSIS — R33.9 URINARY RETENTION: Primary | ICD-10-CM

## 2020-04-03 NOTE — PATIENT INSTRUCTIONS
Follow up with Dr. Porter in 6 month with uroflow/pvr.    It was a pleasure meeting with you today.  Thank you for allowing me and my team the privilege of caring for you today.  YOU are the reason we are here, and I truly hope we provided you with the excellent service you deserve.  Please let us know if there is anything else we can do for you so that we can be sure you are leaving completely satisfied with your care experience.        Korina Juan, CMA

## 2020-04-03 NOTE — TELEPHONE ENCOUNTER
Spoke with patient to confirm current medications, allergies, and pharmacy. Checked patient in and let them know Dr. Porter will be giving them a call at their scheduled appointment time. Patient stated understanding.      Amanda Sabillon EMT

## 2020-04-03 NOTE — PROGRESS NOTES
"Jacinto Flannery is a 79 year old male who is being evaluated via a billable telephone visit.      The patient has been notified of following:     \"This telephone visit will be conducted via a call between you and your physician/provider. We have found that certain health care needs can be provided without the need for a physical exam.  This service lets us provide the care you need with a short phone conversation.  If a prescription is necessary we can send it directly to your pharmacy.  If lab work is needed we can place an order for that and you can then stop by our lab to have the test done at a later time.    If during the course of the call the physician/provider feels a telephone visit is not appropriate, you will not be charged for this service.\"     Patient has given verbal consent for Telephone visit?  Yes    Jacinto Flannery complains of    Chief Complaint   Patient presents with     Follow Up     Retention/BPH       I have reviewed and updated the patient's Past Medical History, Social History, Family History and Medication List.    ALLERGIES  Macrodantin [nitrofurantoin]; Sulfa drugs; and Tamsulosin    Additional provider notes:    Unchanged symptoms from last visit   Still taking alfuzosin   Notes that he often does not empty well in the AM but is able to empty better in the PM.     Continue alfuzosin   Uroflow/PVR in six months   Follow up in six months         Phone call duration: 13 minutes    Natalie Porter MD    "

## 2020-04-03 NOTE — NURSING NOTE
Chief Complaint   Patient presents with     Follow Up     Retention/BPH       There were no vitals taken for this visit. Visit completed over the phone.    Patient Active Problem List   Diagnosis     CAD S/P percutaneous coronary angioplasty     CAD (coronary artery disease)     BPH (benign prostatic hyperplasia)     Hyperlipidemia with target LDL less than 70     S/P coronary artery stent placement     GERD (gastroesophageal reflux disease)     S/P TURP     Dermatitis     AK (actinic keratosis)     SCC (squamous cell carcinoma)     SK (seborrheic keratosis)     EIC (epidermal inclusion cyst)     History of squamous cell carcinoma     Seborrheic keratosis     Inflamed seborrheic keratosis     History of dysplastic nevus     Varicose veins     Medication reaction, initial encounter       Allergies   Allergen Reactions     Macrodantin [Nitrofurantoin] Rash     Sulfa Drugs Rash     Tamsulosin Rash       Current Outpatient Medications   Medication Sig Dispense Refill     alfuzosin ER (UROXATRAL) 10 MG 24 hr tablet TAKE 1 TABLET BY MOUTH ONCE DAILY 90 tablet 3     aspirin 81 MG tablet Take 81 mg by mouth daily       carBAMazepine (EPITOL) 200 MG tablet Take 1 tablet (200 mg) by mouth 2 times daily 180 tablet 3     Cyanocobalamin (B-12) 1000 MCG CAPS Take 1 capsule by mouth daily        eszopiclone (LUNESTA) 2 MG tablet Take 1 tablet (2 mg) by mouth nightly as needed for sleep 15 tablet 2     metoprolol succinate ER (TOPROL-XL) 25 MG 24 hr tablet TAKE 1/2 (ONE-HALF) TABLET BY MOUTH ONCE DAILY 45 tablet 3     metroNIDAZOLE (METROCREAM) 0.75 % external cream Apply topically 2 times daily 45 g 11     nitroGLYcerin (NITROSTAT) 0.4 MG sublingual tablet Place 1 tablet (0.4 mg) under the tongue every 5 minutes as needed for chest pain 25 tablet 3     order for DME Equipment being ordered: bilateral shoe orthotics, 2 pairs dispense at once 1 Device 3     order for DME Equipment being ordered: Orthotics for both feet, need  replacement 1 Package 1     simvastatin (ZOCOR) 40 MG tablet Take 1 tablet (40 mg) by mouth At Bedtime 90 tablet 3     valACYclovir (VALTREX) 1000 mg tablet Take 1 pill twice a day for 3 days at the first sign of a cold core. 6 tablet 3       Social History     Tobacco Use     Smoking status: Former Smoker     Packs/day: 0.50     Years: 1.00     Pack years: 0.50     Types: Cigarettes     Start date: 1965     Last attempt to quit: 1970     Years since quittin.9     Smokeless tobacco: Never Used   Substance Use Topics     Alcohol use: Yes     Comment: rarely - a beer a month     Drug use: No       Amanda Sabillon, EMT  4/3/2020  8:17 AM

## 2020-07-14 ENCOUNTER — TELEPHONE (OUTPATIENT)
Dept: DERMATOLOGY | Facility: CLINIC | Age: 80
End: 2020-07-14

## 2020-07-14 NOTE — TELEPHONE ENCOUNTER
I spoke to Jacinto Flannery and he wants to keep his his in person appointment due to concerning areas. Went over the no visitors or  parking at this time. Patient understood and had no further questions or concerns.      Ghada Ibrahim LPN

## 2020-07-20 ENCOUNTER — OFFICE VISIT (OUTPATIENT)
Dept: DERMATOLOGY | Facility: CLINIC | Age: 80
End: 2020-07-20
Payer: MEDICARE

## 2020-07-20 DIAGNOSIS — Z85.828 HISTORY OF SKIN CANCER: ICD-10-CM

## 2020-07-20 DIAGNOSIS — L82.1 SK (SEBORRHEIC KERATOSIS): Primary | ICD-10-CM

## 2020-07-20 ASSESSMENT — PAIN SCALES - GENERAL: PAINLEVEL: NO PAIN (0)

## 2020-07-20 NOTE — LETTER
7/20/2020       RE: Jacinto Flannery  [address]  [place] [address]     Dear Colleague,    Thank you for referring your patient, Jacinto Flannery, to the [facility] DERMATOLOGY at [facility]. Please see a copy of my visit note below.    [facility] Dermatology Note      Dermatology Problem List:  1.  NMSC              -SCC, left (dorsal) forearm, s/p excision 8/18/2015              -SCC, right dorsal hand, s/p Mohs 3/18/2015  2. Compound nevus with moderate dysplasia, mid upper back              -s/p biopsy 10/16/2015  3. Actinic keratosis                -s/p cryotherapy 07/20/2016, 03/06/19              -efudex in November 2016 (used for 19 days), pt had a very robust response              -pt given hydrocortisone 2.5% cream BID PRN due to severe response and told to stop efudex  4. Unknown skin eruption penis. Resolved 7/5/17              -s/p triamcinolone cream initiated 3/17/2016              -s/p Nystatin initiated 3/17/2016              -s/p Mupirocin initiated 2/16/2015              -s/p Valtrex initiated 2/6/2015              -s/p biopsy showing hemorraghic scale crust and ulceration 2/3/2015  5. Drug exanthem, 2/2 to tamsulosin              -s/p biopsy showing interface dermatitis 12/29/2014              -s/p lidex initiated 1/8/2015              -s/p clobetasol initiated 12/29/2014-improved  6. Traumatic tattoo              -Central forehead, measured 1X1.5cm on 10/16/17  7. Rosacea              -Metronidazole cream     CC:   Chief Complaint   Patient presents with     Skin Check     Jacinto is here today for a 6 month follow up skin exam.         Encounter Date: Jul 20, 2020    History of Present Illness:  Mr. Jacinto Flannery is a 80 year old male who with a history of skin cancer presents for a skin check.  No lesions of concern.  No bleeding or tender spots.  His rosacea has been quiet and he has not needed his metrocream recently.    Past Medical History:    Patient Active Problem List   Diagnosis     CAD S/P percutaneous coronary angioplasty     CAD (coronary artery disease)     BPH (benign prostatic hyperplasia)     Hyperlipidemia with target LDL less than 70     S/P coronary artery stent placement     GERD (gastroesophageal reflux disease)     S/P TURP     Dermatitis     AK (actinic keratosis)     SCC (squamous cell carcinoma)     SK (seborrheic keratosis)     EIC (epidermal inclusion cyst)     History of squamous cell carcinoma     Seborrheic keratosis     Inflamed seborrheic keratosis     History of dysplastic nevus     Varicose veins     Medication reaction, initial encounter     Past Medical History:   Diagnosis Date     BPH (benign prostatic hyperplasia)      CAD (coronary artery disease)     MI 1989, CABG 1990, 11/2014 PCI to LM, LCx and RCA     Conductive hearing loss Unknown.     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     Skin disease      Squamous cell carcinoma      Past Surgical History:   Procedure Laterality Date     C CABG, VEIN, THREE  1990    SVG-LAD in [place]     CHOLECYSTECTOMY  1992    in [place]     ENDOSCOPY  04/26/2013    Done at [facility] in [place], [place]     GENITOURINARY SURGERY       HEART CATH STENT COR W/WO PTCA  11/12/2014    x 4 ANKUR's (two ostial to mid-RCA, one ostial LCx and mid-LM, one ostial LAD)     HERNIA REPAIR Right 2010    in [place]     LASER KTP GREEN LIGHT PHOTOSELECTIVE VAPORIZATION PROSTATE N/A 1/5/2015    Procedure: LASER KTP GREEN LIGHT PHOTOSELECTIVE VAPORIZATION PROSTATE;  Surgeon: Natalie Porter MD;  Location:  OR      MOHS MICROGRAPHIC PROCEDURE         Social History:  Patient reports that he quit smoking about 50 years ago. His smoking use included cigarettes. He started smoking about 54 years ago. He has a 0.50 pack-year smoking history. He has never used smokeless tobacco. He reports current alcohol use. He reports that he does not use drugs.    Family History:  Family History   Problem Relation Age of Onset     Cancer Father         Stomach     Heart Disease Mother      Heart Disease Maternal Grandmother      Skin Cancer No family hx of      Melanoma No family hx of        Medications:  Current Outpatient Medications   Medication Sig Dispense Refill     alfuzosin ER (UROXATRAL) 10 MG 24 hr tablet TAKE 1 TABLET BY MOUTH ONCE DAILY 90 tablet 3     aspirin 81 MG tablet Take 81 mg by mouth daily       carBAMazepine (EPITOL) 200 MG tablet Take 1 tablet (200 mg) by mouth 2 times daily 180 tablet 3     Cyanocobalamin (B-12) 1000 MCG CAPS Take 1 capsule by mouth daily        eszopiclone (LUNESTA) 2 MG tablet Take 1 tablet (2 mg) by mouth nightly as needed for sleep 15 tablet 2     metoprolol succinate ER (TOPROL-XL) 25 MG 24 hr tablet TAKE 1/2 (ONE-HALF) TABLET BY MOUTH ONCE DAILY 45 tablet 3     metroNIDAZOLE (METROCREAM) 0.75 % external cream Apply topically 2 times daily 45 g 11     nitroGLYcerin (NITROSTAT) 0.4 MG sublingual tablet Place 1 tablet (0.4 mg) under the tongue every 5 minutes as needed for chest pain 25 tablet 3     simvastatin (ZOCOR) 40 MG tablet Take 1 tablet (40 mg) by mouth At Bedtime 90 tablet 3     valACYclovir (VALTREX) 1000 mg tablet Take 1 pill twice a day for 3 days at the first sign of a cold core. 6 tablet 3     order for DME Equipment being ordered: bilateral shoe orthotics, 2 pairs dispense at once 1 Device 3     order for DME Equipment being ordered: Orthotics for both feet, need replacement 1 Package 1     Allergies   Allergen Reactions     Macrodantin [Nitrofurantoin] Rash     Sulfa Drugs Rash      Tamsulosin Rash         .    Physical exam:  Vitals: There were no vitals taken for this visit.  GEN: This is a well developed, well-nourished male in no acute distress, in a pleasant mood.    SKIN: Full skin, which includes the head/face, both arms, chest, back, abdomen,both legs, buttocks, digits and/or nails, was examined.  -Villatoro skin type: I  -There are waxy stuck on tan to brown papules on the face, trunk and extremities.  -There is no erythema, telangectasias, nodularity, or pigmentation on the previous skin cancer sites.  -No other lesions of concern on areas examined.     Impression/Plan:  1. Seborrheic keratosis, non irritated  - No atypia  - Reassurance    2. History of nonmelanoma skin cancer, no clincial evidence of recurrence  - Continue to monitor sites  -He prefers every 6 month skin checks      3. Rosacea  - Not active  - Rosacea prn        CC Referred Self, MD  No address on file on close of this encounter.  Follow-up in 6 months, earlier for new or changing lesions.       Staff Involved:  Staff Only          Again, thank you for allowing me to participate in the care of your patient.      Sincerely,    Anjelica Quarles MD

## 2020-07-20 NOTE — NURSING NOTE
Chief Complaint   Patient presents with     Skin Check     Jacinto is here today for a 6 month follow up skin exam.     GENOVEVA STROUD on 7/20/2020 at 9:46 AM

## 2020-07-20 NOTE — PROGRESS NOTES
[facility] Dermatology Note      Dermatology Problem List:  1.  NMSC              -SCC, left (dorsal) forearm, s/p excision 8/18/2015              -SCC, right dorsal hand, s/p Mohs 3/18/2015  2. Compound nevus with moderate dysplasia, mid upper back              -s/p biopsy 10/16/2015  3. Actinic keratosis                -s/p cryotherapy 07/20/2016, 03/06/19              -efudex in November 2016 (used for 19 days), pt had a very robust response              -pt given hydrocortisone 2.5% cream BID PRN due to severe response and told to stop efudex  4. Unknown skin eruption penis. Resolved 7/5/17              -s/p triamcinolone cream initiated 3/17/2016              -s/p Nystatin initiated 3/17/2016              -s/p Mupirocin initiated 2/16/2015              -s/p Valtrex initiated 2/6/2015              -s/p biopsy showing hemorraghic scale crust and ulceration 2/3/2015  5. Drug exanthem, 2/2 to tamsulosin              -s/p biopsy showing interface dermatitis 12/29/2014              -s/p lidex initiated 1/8/2015              -s/p clobetasol initiated 12/29/2014-improved  6. Traumatic tattoo              -Central forehead, measured 1X1.5cm on 10/16/17  7. Rosacea              -Metronidazole cream     CC:   Chief Complaint   Patient presents with     Skin Check     Jacinto is here today for a 6 month follow up skin exam.         Encounter Date: Jul 20, 2020    History of Present Illness:  Mr. Jacinto Flannery is a 80 year old male who with a history of skin cancer presents for a skin check.  No lesions of concern.  No bleeding or tender spots.  His rosacea has been quiet and he has not needed his metrocream recently.    Past Medical History:   Patient Active Problem List   Diagnosis     CAD S/P percutaneous coronary angioplasty     CAD (coronary artery disease)     BPH (benign prostatic hyperplasia)     Hyperlipidemia with target LDL less than 70     S/P coronary artery stent placement     GERD  (gastroesophageal reflux disease)     S/P TURP     Dermatitis     AK (actinic keratosis)     SCC (squamous cell carcinoma)     SK (seborrheic keratosis)     EIC (epidermal inclusion cyst)     History of squamous cell carcinoma     Seborrheic keratosis     Inflamed seborrheic keratosis     History of dysplastic nevus     Varicose veins     Medication reaction, initial encounter     Past Medical History:   Diagnosis Date     BPH (benign prostatic hyperplasia)      CAD (coronary artery disease)     MI 1989, CABG 1990, 11/2014 PCI to LM, LCx and RCA     Conductive hearing loss Unknown.     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     Skin disease      Squamous cell carcinoma      Past Surgical History:   Procedure Laterality Date     C CABG, VEIN, THREE  1990    SVG-LAD in [place]     CHOLECYSTECTOMY  1992    in [place]     ENDOSCOPY  04/26/2013    Done at [facility] in [place], [place]     GENITOURINARY SURGERY       HEART CATH STENT COR W/WO PTCA  11/12/2014    x 4 ANKUR's (two ostial to mid-RCA, one ostial LCx and mid-LM, one ostial LAD)     HERNIA REPAIR Right 2010    in [place]     LASER KTP GREEN LIGHT PHOTOSELECTIVE VAPORIZATION PROSTATE N/A 1/5/2015    Procedure: LASER KTP GREEN LIGHT PHOTOSELECTIVE VAPORIZATION PROSTATE;  Surgeon: Natalie Porter MD;  Location:  OR     MOHS MICROGRAPHIC PROCEDURE         Social History:  Patient reports that he quit smoking about 50 years ago. His smoking use included cigarettes. He started smoking about 54 years ago. He has a 0.50 pack-year smoking history. He has never used smokeless  tobacco. He reports current alcohol use. He reports that he does not use drugs.    Family History:  Family History   Problem Relation Age of Onset     Cancer Father         Stomach     Heart Disease Mother      Heart Disease Maternal Grandmother      Skin Cancer No family hx of      Melanoma No family hx of        Medications:  Current Outpatient Medications   Medication Sig Dispense Refill     alfuzosin ER (UROXATRAL) 10 MG 24 hr tablet TAKE 1 TABLET BY MOUTH ONCE DAILY 90 tablet 3     aspirin 81 MG tablet Take 81 mg by mouth daily       carBAMazepine (EPITOL) 200 MG tablet Take 1 tablet (200 mg) by mouth 2 times daily 180 tablet 3     Cyanocobalamin (B-12) 1000 MCG CAPS Take 1 capsule by mouth daily        eszopiclone (LUNESTA) 2 MG tablet Take 1 tablet (2 mg) by mouth nightly as needed for sleep 15 tablet 2     metoprolol succinate ER (TOPROL-XL) 25 MG 24 hr tablet TAKE 1/2 (ONE-HALF) TABLET BY MOUTH ONCE DAILY 45 tablet 3     metroNIDAZOLE (METROCREAM) 0.75 % external cream Apply topically 2 times daily 45 g 11     nitroGLYcerin (NITROSTAT) 0.4 MG sublingual tablet Place 1 tablet (0.4 mg) under the tongue every 5 minutes as needed for chest pain 25 tablet 3     simvastatin (ZOCOR) 40 MG tablet Take 1 tablet (40 mg) by mouth At Bedtime 90 tablet 3     valACYclovir (VALTREX) 1000 mg tablet Take 1 pill twice a day for 3 days at the first sign of a cold core. 6 tablet 3     order for DME Equipment being ordered: bilateral shoe orthotics, 2 pairs dispense at once 1 Device 3     order for DME Equipment being ordered: Orthotics for both feet, need replacement 1 Package 1     Allergies   Allergen Reactions     Macrodantin [Nitrofurantoin] Rash     Sulfa Drugs Rash     Tamsulosin Rash         .    Physical exam:  Vitals: There were no vitals taken for this visit.  GEN: This is a well developed, well-nourished male in no acute distress, in a pleasant mood.    SKIN: Full skin, which includes the head/face, both arms,  chest, back, abdomen,both legs, buttocks, digits and/or nails, was examined.  -Villatoro skin type: I  -There are waxy stuck on tan to brown papules on the face, trunk and extremities.  -There is no erythema, telangectasias, nodularity, or pigmentation on the previous skin cancer sites.  -No other lesions of concern on areas examined.     Impression/Plan:  1. Seborrheic keratosis, non irritated  - No atypia  - Reassurance    2. History of nonmelanoma skin cancer, no clincial evidence of recurrence  - Continue to monitor sites  -He prefers every 6 month skin checks      3. Rosacea  - Not active  - Rosacea prn        CC Referred Self, MD  No address on file on close of this encounter.  Follow-up in 6 months, earlier for new or changing lesions.       Staff Involved:  Staff Only

## 2020-07-29 ENCOUNTER — TELEPHONE (OUTPATIENT)
Dept: UROLOGY | Facility: CLINIC | Age: 80
End: 2020-07-29

## 2020-07-29 NOTE — TELEPHONE ENCOUNTER
[facility] Call Center    Phone Message    May a detailed message be left on voicemail: yes     Reason for Call: Other: Jacinto is currently scheduled for an appointment with Dr. Porter for 10/9/20. Jacinto has heard that she has left the clinic, and is wondering if he can see Dr. Pritchett instead. Per clinic protocols, it is recommended an encounter be sent over when patients are changing providers. Please give Jacinto a call back at your earliest convenience to discuss.     Action Taken: Message routed to:  [facility] ([facility]  [facility]    Travel Screening: Not Applicable

## 2020-09-13 DIAGNOSIS — N40.1 BENIGN NON-NODULAR PROSTATIC HYPERPLASIA WITH LOWER URINARY TRACT SYMPTOMS: ICD-10-CM

## 2020-09-14 ENCOUNTER — PRE VISIT (OUTPATIENT)
Dept: UROLOGY | Facility: CLINIC | Age: 80
End: 2020-09-14

## 2020-09-16 RX ORDER — ALFUZOSIN HYDROCHLORIDE 10 MG/1
TABLET, EXTENDED RELEASE ORAL
Qty: 90 TABLET | Refills: 0 | Status: SHIPPED | OUTPATIENT
Start: 2020-09-16 | End: 2020-09-22

## 2020-09-22 ENCOUNTER — VIRTUAL VISIT (OUTPATIENT)
Dept: UROLOGY | Facility: CLINIC | Age: 80
End: 2020-09-22
Payer: MEDICARE

## 2020-09-22 DIAGNOSIS — N40.1 BENIGN NON-NODULAR PROSTATIC HYPERPLASIA WITH LOWER URINARY TRACT SYMPTOMS: ICD-10-CM

## 2020-09-22 RX ORDER — ALFUZOSIN HYDROCHLORIDE 10 MG/1
1 TABLET, EXTENDED RELEASE ORAL DAILY
Qty: 90 TABLET | Refills: 3 | Status: SHIPPED | OUTPATIENT
Start: 2020-09-22 | End: 2021-10-26

## 2020-09-22 ASSESSMENT — PAIN SCALES - GENERAL: PAINLEVEL: NO PAIN (0)

## 2020-09-22 NOTE — LETTER
9/22/2020       RE: Jacinto Flannery  [address]  [facility] [address]     Dear Colleague,    Thank you for referring your patient, Jacinto Flannery, to the [facility] UROLOGY AND INST FOR PROSTATE AND UROLOGIC CANCERS at [facility]. Please see a copy of my visit note below.    Jacinto Flannery is a 80 year old male who is being evaluated via a billable telephone visit.            UROLOGY TELEPHONE FOLLOW-UP NOTE           Chief Complaint:   Enlarged prostate         Interval Update    Jacinto Flannery is a very pleasant 81 yo M with hx of urinary retention.  He underwent PVP with Dr. Porter in 2014.  He has had some residual symptoms and has managed these conservatively over the years.  He is not bothered currently. He has had some elevated PVRs in the past.  At one point underwent VUDS but there was difficulty getting the cather in.  One year ago had a PVR of 30 and a cystoscopy around that time showed minimal regrowth with an open bladder neck.    Today notes: He is very pleased with the way he voids.  Denies slow stream, hesitancy, UTI or hematuria.  He takes alfuzosin.      Labs and Pathology:    I personally reviewed all applicable laboratory data and went over findings with patient  Significant for:    CBC RESULTS:  Recent Labs   Lab Test 04/01/16  1108 02/09/15  1325 01/17/15  0654 01/16/15  0737   WBC 5.5 4.5 5.1 6.5   HGB 14.1 11.5* 10.5* 11.6*   * 161 132* 126*        BMP RESULTS:  Recent Labs   Lab Test 09/20/19  0816 08/22/18  0831 04/01/16  1108 06/08/15  0839    140 136 141   POTASSIUM 4.4 4.1 4.8 4.4   CHLORIDE 107 106 103 108   CO2 27 29 30 30   ANIONGAP 5 5 3 3   GLC 90 90 88 88   BUN 18 20 18 18   CR 0.96 0.95 0.92 0.89   GFRESTIMATED 75 77 81 84   GFRESTBLACK 87 >90 >90   GFR Calc   >90   GFR Calc     JAMIE 8.8 8.6 8.7 8.9       UA RESULTS:   Recent Labs   Lab Test 01/16/15  0053 01/12/15  1754 12/17/14  1050   SG 1.005 1.012 1.006    URINEPH 6.5 6.5 5.0   NITRITE Negative Positive* Negative   RBCU 47* 1,312* 137*   WBCU 14* 32* 13*            Assessment/Plan   80 year old male with hx of retention, doing well  -Will coordinate PVR through the office on an elective basis, he will call several weeks prior to being in [place]  -Renewed rx for alfuzosin  -Discussed PSA testing and associated controversy.  He has not had a PSA in several years.  Denies prior personal or family hx of prostate cancer.  He will have a clinical exam with his PMD int he coming months and elects to proceed only if abnormal exam.   -Can follow-up in an annual basis            Past Medical History:     Past Medical History:   Diagnosis Date     BPH (benign prostatic hyperplasia)      CAD (coronary artery disease)     MI 1989, CABG 1990, 11/2014 PCI to LM, LCx and RCA     Conductive hearing loss Unknown.     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     Skin disease      Squamous cell carcinoma             Past Surgical History:     Past Surgical History:   Procedure Laterality Date     C CABG, VEIN, THREE  1990    SVG-LAD in [place]     CHOLECYSTECTOMY  1992    in [place]     ENDOSCOPY  04/26/2013    Done at [facility] in [place], [place]     GENITOURINARY SURGERY       HEART CATH STENT COR W/WO PTCA  11/12/2014    x 4 ANKUR's (two ostial to mid-RCA, one ostial LCx and mid-LM, one ostial LAD)     HERNIA REPAIR Right 2010    in [place]     LASER KTP GREEN LIGHT PHOTOSELECTIVE VAPORIZATION PROSTATE N/A 1/5/2015    Procedure: LASER KTP GREEN LIGHT PHOTOSELECTIVE VAPORIZATION PROSTATE;  Surgeon:  Natalie Porter MD;  Location: [facility] OR     MOHS MICROGRAPHIC PROCEDURE              Medications     Current Outpatient Medications   Medication     alfuzosin ER (UROXATRAL) 10 MG 24 hr tablet     aspirin 81 MG tablet     carBAMazepine (EPITOL) 200 MG tablet     Cyanocobalamin (B-12) 1000 MCG CAPS     eszopiclone (LUNESTA) 2 MG tablet     melatonin 1 MG CAPS     metoprolol succinate ER (TOPROL-XL) 25 MG 24 hr tablet     metroNIDAZOLE (METROCREAM) 0.75 % external cream     nitroGLYcerin (NITROSTAT) 0.4 MG sublingual tablet     simvastatin (ZOCOR) 40 MG tablet     valACYclovir (VALTREX) 1000 mg tablet     Current Facility-Administered Medications   Medication     lidocaine 1% with EPINEPHrine 1:100,000 injection 0.5 mL            Family History:     Family History   Problem Relation Age of Onset     Cancer Father         Stomach     Heart Disease Mother      Heart Disease Maternal Grandmother      Skin Cancer No family hx of      Melanoma No family hx of             Social History:     Social History     Socioeconomic History     Marital status:      Spouse name: Not on file     Number of children: Not on file     Years of education: Not on file     Highest education level: Not on file   Occupational History     Not on file   Social Needs     Financial resource strain: Not on file     Food insecurity     Worry: Not on file     Inability: Not on file     Transportation needs     Medical: Not on file     Non-medical: Not on file   Tobacco Use     Smoking status: Former Smoker     Packs/day: 0.50     Years: 1.00     Pack years: 0.50     Types: Cigarettes     Start date: 1965     Last attempt to quit: 1970     Years since quittin.4     Smokeless tobacco: Never Used   Substance and Sexual Activity     Alcohol use: Yes     Comment: rarely - a beer a month     Drug use: No     Sexual activity: Yes     Partners: Female     Birth control/protection: Male Surgical   Lifestyle     Physical activity      "Days per week: Not on file     Minutes per session: Not on file     Stress: Not on file   Relationships     Social connections     Talks on phone: Not on file     Gets together: Not on file     Attends [employer] service: Not on file     Active member of club or organization: Not on file     Attends meetings of clubs or organizations: Not on file     Relationship status: Not on file     Intimate partner violence     Fear of current or ex partner: Not on file     Emotionally abused: Not on file     Physically abused: Not on file     Forced sexual activity: Not on file   Other Topics Concern     Parent/sibling w/ CABG, MI or angioplasty before 65F 55M? Not Asked   Social History Narrative     Not on file            Allergies:   Macrodantin [nitrofurantoin]; Sulfa drugs; and Tamsulosin         Review of Systems:  From intake questionnaire   Negative 14 system review except as noted on HPI, nurse's note.        CC:  Nadine Alonso                The patient has been notified of following:     \"This telephone visit will be conducted via a call between you and your physician/provider. We have found that certain health care needs can be provided without the need for a physical exam.  This service lets us provide the care you need with a short phone conversation.  If a prescription is necessary we can send it directly to your pharmacy.  If lab work is needed we can place an order for that and you can then stop by our lab to have the test done at a later time.    Telephone visits are billed at different rates depending on your insurance coverage. During this emergency period, for some insurers they may be billed the same as an in-person visit.  Please reach out to your insurance provider with any questions.    If during the course of the call the physician/provider feels a telephone visit is not appropriate, you will not be charged for this service.\"    Patient has given verbal consent for Telephone visit?  " Yes    What phone number would you like to be contacted at? [phone]    How would you like to obtain your AVS? [email or web address]    Phone call duration: 12 minutes    Demian Pritchett MD

## 2020-09-22 NOTE — PATIENT INSTRUCTIONS
Schedule nurse visit for post void residual when you are back in [place]. (patient will call to schedule)    Follow up in one year with Gill Melendez PA-C or Paulette Marshall CNP in one year.    It was a pleasure meeting with you today.  Thank you for allowing me and my team the privilege of caring for you today.  YOU are the reason we are here, and I truly hope we provided you with the excellent service you deserve.  Please let us know if there is anything else we can do for you so that we can be sure you are leaving completely satisfied with your care experience.        Korina Juan, CMA

## 2020-09-22 NOTE — NURSING NOTE
Chief Complaint   Patient presents with     RECHECK     Urinary retention follow up     Korina Juan, CMA

## 2020-09-22 NOTE — PROGRESS NOTES
Jacinto Flannery is a 80 year old male who is being evaluated via a billable telephone visit.            UROLOGY TELEPHONE FOLLOW-UP NOTE           Chief Complaint:   Enlarged prostate         Interval Update    Jacinto Flannery is a very pleasant 79 yo M with hx of urinary retention.  He underwent PVP with Dr. Porter in 2014.  He has had some residual symptoms and has managed these conservatively over the years.  He is not bothered currently. He has had some elevated PVRs in the past.  At one point underwent VUDS but there was difficulty getting the cather in.  One year ago had a PVR of 30 and a cystoscopy around that time showed minimal regrowth with an open bladder neck.    Today notes: He is very pleased with the way he voids.  Denies slow stream, hesitancy, UTI or hematuria.  He takes alfuzosin.      Labs and Pathology:    I personally reviewed all applicable laboratory data and went over findings with patient  Significant for:    CBC RESULTS:  Recent Labs   Lab Test 04/01/16  1108 02/09/15  1325 01/17/15  0654 01/16/15  0737   WBC 5.5 4.5 5.1 6.5   HGB 14.1 11.5* 10.5* 11.6*   * 161 132* 126*        BMP RESULTS:  Recent Labs   Lab Test 09/20/19  0816 08/22/18  0831 04/01/16  1108 06/08/15  0839    140 136 141   POTASSIUM 4.4 4.1 4.8 4.4   CHLORIDE 107 106 103 108   CO2 27 29 30 30   ANIONGAP 5 5 3 3   GLC 90 90 88 88   BUN 18 20 18 18   CR 0.96 0.95 0.92 0.89   GFRESTIMATED 75 77 81 84   GFRESTBLACK 87 >90 >90   GFR Calc   >90   GFR Calc     JAMIE 8.8 8.6 8.7 8.9       UA RESULTS:   Recent Labs   Lab Test 01/16/15  0053 01/12/15  1754 12/17/14  1050   SG 1.005 1.012 1.006   URINEPH 6.5 6.5 5.0   NITRITE Negative Positive* Negative   RBCU 47* 1,312* 137*   WBCU 14* 32* 13*            Assessment/Plan   80 year old male with hx of retention, doing well  -Will coordinate PVR through the office on an elective basis, he will call several weeks prior to being in  [place]  -Renewed rx for alfuzosin  -Discussed PSA testing and associated controversy.  He has not had a PSA in several years.  Denies prior personal or family hx of prostate cancer.  He will have a clinical exam with his PMD int he coming months and elects to proceed only if abnormal exam.   -Can follow-up in an annual basis            Past Medical History:     Past Medical History:   Diagnosis Date     BPH (benign prostatic hyperplasia)      CAD (coronary artery disease)     MI 1989, CABG 1990, 11/2014 PCI to LM, LCx and RCA     Conductive hearing loss Unknown.     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     Skin disease      Squamous cell carcinoma             Past Surgical History:     Past Surgical History:   Procedure Laterality Date     C CABG, VEIN, THREE  1990    SVG-LAD in [place]     CHOLECYSTECTOMY  1992    in [place]     ENDOSCOPY  04/26/2013    Done at [facility] in [place], [place]     GENITOURINARY SURGERY       HEART CATH STENT COR W/WO PTCA  11/12/2014    x 4 ANKUR's (two ostial to mid-RCA, one ostial LCx and mid-LM, one ostial LAD)     HERNIA REPAIR Right 2010    in [place]     LASER KTP GREEN LIGHT PHOTOSELECTIVE VAPORIZATION PROSTATE N/A 1/5/2015    Procedure: LASER KTP GREEN LIGHT PHOTOSELECTIVE VAPORIZATION PROSTATE;  Surgeon: Natalie Porter MD;  Location:  OR     MOHS MICROGRAPHIC PROCEDURE              Medications     Current Outpatient Medications   Medication     alfuzosin ER (UROXATRAL) 10 MG 24 hr tablet     aspirin 81 MG tablet     carBAMazepine (EPITOL) 200 MG tablet     Cyanocobalamin (B-12)  1000 MCG CAPS     eszopiclone (LUNESTA) 2 MG tablet     melatonin 1 MG CAPS     metoprolol succinate ER (TOPROL-XL) 25 MG 24 hr tablet     metroNIDAZOLE (METROCREAM) 0.75 % external cream     nitroGLYcerin (NITROSTAT) 0.4 MG sublingual tablet     simvastatin (ZOCOR) 40 MG tablet     valACYclovir (VALTREX) 1000 mg tablet     Current Facility-Administered Medications   Medication     lidocaine 1% with EPINEPHrine 1:100,000 injection 0.5 mL            Family History:     Family History   Problem Relation Age of Onset     Cancer Father         Stomach     Heart Disease Mother      Heart Disease Maternal Grandmother      Skin Cancer No family hx of      Melanoma No family hx of             Social History:     Social History     Socioeconomic History     Marital status:      Spouse name: Not on file     Number of children: Not on file     Years of education: Not on file     Highest education level: Not on file   Occupational History     Not on file   Social Needs     Financial resource strain: Not on file     Food insecurity     Worry: Not on file     Inability: Not on file     Transportation needs     Medical: Not on file     Non-medical: Not on file   Tobacco Use     Smoking status: Former Smoker     Packs/day: 0.50     Years: 1.00     Pack years: 0.50     Types: Cigarettes     Start date: 1965     Last attempt to quit: 1970     Years since quittin.4     Smokeless tobacco: Never Used   Substance and Sexual Activity     Alcohol use: Yes     Comment: rarely - a beer a month     Drug use: No     Sexual activity: Yes     Partners: Female     Birth control/protection: Male Surgical   Lifestyle     Physical activity     Days per week: Not on file     Minutes per session: Not on file     Stress: Not on file   Relationships     Social connections     Talks on phone: Not on file     Gets together: Not on file     Attends Anabaptism service: Not on file     Active member of club or organization: Not on file      "Attends meetings of clubs or organizations: Not on file     Relationship status: Not on file     Intimate partner violence     Fear of current or ex partner: Not on file     Emotionally abused: Not on file     Physically abused: Not on file     Forced sexual activity: Not on file   Other Topics Concern     Parent/sibling w/ CABG, MI or angioplasty before 65F 55M? Not Asked   Social History Narrative     Not on file            Allergies:   Macrodantin [nitrofurantoin]; Sulfa drugs; and Tamsulosin         Review of Systems:  From intake questionnaire   Negative 14 system review except as noted on HPI, nurse's note.        CC:  Nadine Alonso                The patient has been notified of following:     \"This telephone visit will be conducted via a call between you and your physician/provider. We have found that certain health care needs can be provided without the need for a physical exam.  This service lets us provide the care you need with a short phone conversation.  If a prescription is necessary we can send it directly to your pharmacy.  If lab work is needed we can place an order for that and you can then stop by our lab to have the test done at a later time.    Telephone visits are billed at different rates depending on your insurance coverage. During this emergency period, for some insurers they may be billed the same as an in-person visit.  Please reach out to your insurance provider with any questions.    If during the course of the call the physician/provider feels a telephone visit is not appropriate, you will not be charged for this service.\"    Patient has given verbal consent for Telephone visit?  Yes    What phone number would you like to be contacted at? [phone]    How would you like to obtain your AVS? MyChart    Phone call duration: 12 minutes    Demian Pritchett MD      "

## 2020-09-23 ENCOUNTER — OFFICE VISIT (OUTPATIENT)
Dept: DERMATOLOGY | Facility: CLINIC | Age: 80
End: 2020-09-23
Payer: MEDICARE

## 2020-09-23 DIAGNOSIS — L82.0 INFLAMED SEBORRHEIC KERATOSIS: Primary | ICD-10-CM

## 2020-09-23 ASSESSMENT — PAIN SCALES - GENERAL: PAINLEVEL: NO PAIN (0)

## 2020-09-23 NOTE — NURSING NOTE
Dermatology Rooming Note    Jacinto Flannery's goals for this visit include:   Chief Complaint   Patient presents with     Derm Problem     Sammy is here today to have a spot on his neck looked at.      KAYA Sanon

## 2020-09-23 NOTE — PATIENT INSTRUCTIONS
Cryotherapy    What is it?    Use of a very cold liquid, such as liquid nitrogen, to freeze and destroy abnormal skin cells that need to be removed    What should I expect?    Tenderness and redness    A small blister that might grow and fill with dark purple blood. There may be crusting.    More than one treatment may be needed if the lesions do not go away.    How do I care for the treated area?    Gently wash the area with your hands when bathing.    Use a thin layer of Vaseline to help with healing. You may use a Band-Aid.     The area should heal within 7-10 days and may leave behind a pink or lighter color.     Do not use an antibiotic or Neosporin ointment.     You may take acetaminophen (Tylenol) for pain.     Call your Doctor if you have:    Severe pain    Signs of infection (warmth, redness, cloudy yellow drainage, and or a bad smell)    Questions or concerns    Who should I call with questions?       [facility]: [phone]       [facility]: [phone]       For urgent needs outside of business hours call the [facility] at [phone]        and ask for the dermatology resident on call

## 2020-09-23 NOTE — LETTER
9/23/2020       RE: Jacinto Flannery  [address]  [place] [address]     Dear Colleague,    Thank you for referring your patient, Jacinto Flannery, to the [facility] DERMATOLOGY at [facility]. Please see a copy of my visit note below.      [facility] Dermatology Note      Dermatology Problem List:  1.  NMSC              -SCC, left (dorsal) forearm, s/p excision 8/18/2015              -SCC, right dorsal hand, s/p Mohs 3/18/2015  2. Compound nevus with moderate dysplasia, mid upper back              -s/p biopsy 10/16/2015  3. Actinic keratosis                -s/p cryotherapy 07/20/2016, 03/06/19              -efudex in November 2016 (used for 19 days), pt had a very robust response              -pt given hydrocortisone 2.5% cream BID PRN due to severe response and told to stop efudex  4. Unknown skin eruption penis. Resolved 7/5/17              -s/p triamcinolone cream initiated 3/17/2016              -s/p Nystatin initiated 3/17/2016              -s/p Mupirocin initiated 2/16/2015              -s/p Valtrex initiated 2/6/2015              -s/p biopsy showing hemorraghic scale crust and ulceration 2/3/2015  5. Drug exanthem, 2/2 to tamsulosin              -s/p biopsy showing interface dermatitis 12/29/2014              -s/p lidex initiated 1/8/2015              -s/p clobetasol initiated 12/29/2014-improved  6. Traumatic tattoo              -Central forehead, measured 1X1.5cm on 10/16/17  7. Rosacea              -Metronidazole cream    Encounter Date: Sep 23, 2020    CC:   Chief Complaint   Patient presents with     Derm Problem     Sammy is here today to have a spot on his neck looked at.          History of Present Illness:  Mr. Jacinto Flannery is a 80 year old male who presents for evaluation of right neck skin lesion. Patient describes the lesion as a stalk-like protuberance that was ~1/4 in in length. It was not painful, itchy or bleeding. Then, 2 days ago, the stalk broke off  "while patient was removing his t-shirt. He would like to have the base of the lesion examined. With his personal history of skin cancer, he did not want to wait until his next scheduled skin check. He states he has had other \"stalk-like\" lesion in the past which have been evaluated on previous skin checks and were found to be benign . However, he has never had one these stalk-like lesions break off like this before.     Past Medical History:   Patient Active Problem List   Diagnosis     CAD S/P percutaneous coronary angioplasty     CAD (coronary artery disease)     BPH (benign prostatic hyperplasia)     Hyperlipidemia with target LDL less than 70     S/P coronary artery stent placement     GERD (gastroesophageal reflux disease)     S/P TURP     Dermatitis     AK (actinic keratosis)     SCC (squamous cell carcinoma)     SK (seborrheic keratosis)     EIC (epidermal inclusion cyst)     History of squamous cell carcinoma     Seborrheic keratosis     Inflamed seborrheic keratosis     History of dysplastic nevus     Varicose veins     Medication reaction, initial encounter     HSV (herpes simplex virus) infection     Hypertension     Insomnia     Thrombocytopenia (H)     Benign prostatic hyperplasia with urinary obstruction     Hypertrophy of prostate without urinary obstruction and other lower urinary tract symptoms (LUTS)     Atherosclerosis of coronary artery     Recurrent coronary arteriosclerosis after percutaneous transluminal coronary angioplasty     History of atypical nevus     Hyperlipidemia     History of transurethral resection of prostate     Presence of coronary angioplasty implant and graft     Squamous cell carcinoma of skin     Varicose veins of other specified sites     Past Medical History:   Diagnosis Date     BPH (benign prostatic hyperplasia)      CAD (coronary artery disease)     MI 1989, CABG 1990, 11/2014 PCI to LM, LCx and RCA     Conductive hearing loss Unknown.     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     Skin disease      Squamous cell carcinoma      Past Surgical History:   Procedure Laterality Date     C CABG, VEIN, THREE  1990    SVG-LAD in [place]     CHOLECYSTECTOMY  1992    in [place]     ENDOSCOPY  04/26/2013    Done at [facility] in [place], [place]     GENITOURINARY SURGERY       HEART CATH STENT COR W/WO PTCA  11/12/2014    x 4 ANKUR's (two ostial to mid-RCA, one ostial LCx and mid-LM, one ostial LAD)     HERNIA REPAIR Right 2010    in [place]     LASER KTP GREEN LIGHT PHOTOSELECTIVE VAPORIZATION PROSTATE N/A 1/5/2015    Procedure: LASER KTP GREEN LIGHT PHOTOSELECTIVE VAPORIZATION PROSTATE;  Surgeon: Natalie Porter MD;  Location: [facility] OR     MOHS MICROGRAPHIC PROCEDURE         Social History:  Patient reports that he quit smoking about 50 years ago. His smoking use included cigarettes. He started smoking about 55 years ago. He has a 0.50 pack-year smoking history. He has never used smokeless tobacco. He reports current alcohol use. He reports that he does not use drugs.    Family History:  Family History   Problem Relation Age of Onset     Cancer Father         Stomach     Heart Disease Mother      Heart Disease Maternal Grandmother      Skin Cancer No family hx of      Melanoma No family hx of        Medications:  Current Outpatient Medications   Medication Sig Dispense Refill     alfuzosin ER (UROXATRAL) 10 MG 24 hr tablet Take 1 tablet (10 mg) by mouth daily 90 tablet 3     aspirin 81 MG tablet Take 81 mg by mouth daily       carBAMazepine (EPITOL) 200 MG tablet Take 1 tablet (200 mg) by mouth 2 times  daily 180 tablet 3     Cyanocobalamin (B-12) 1000 MCG CAPS Take 1 capsule by mouth daily        eszopiclone (LUNESTA) 2 MG tablet Take 1 tablet (2 mg) by mouth nightly as needed for sleep 15 tablet 2     melatonin 1 MG CAPS        metoprolol succinate ER (TOPROL-XL) 25 MG 24 hr tablet TAKE 1/2 (ONE-HALF) TABLET BY MOUTH ONCE DAILY 45 tablet 3     metroNIDAZOLE (METROCREAM) 0.75 % external cream Apply topically 2 times daily 45 g 11     nitroGLYcerin (NITROSTAT) 0.4 MG sublingual tablet Place 1 tablet (0.4 mg) under the tongue every 5 minutes as needed for chest pain 25 tablet 3     simvastatin (ZOCOR) 40 MG tablet Take 1 tablet (40 mg) by mouth At Bedtime 90 tablet 3     valACYclovir (VALTREX) 1000 mg tablet Take 1 pill twice a day for 3 days at the first sign of a cold core. 6 tablet 3        Allergies   Allergen Reactions     Macrodantin [Nitrofurantoin] Rash     Sulfa Drugs Rash     Tamsulosin Rash         Review of Systems:  -Skin Establ Pt: The patient denies any new rash, pruritus, or lesions that are symptomatic, changing or bleeding, except as per HPI.  -Constitutional: Otherwise feeling well today, in usual state of health.  -HEENT: Patient denies nonhealing oral sores.  -Skin: As above in HPI. No additional skin concerns.    Physical exam:  Vitals: There were no vitals taken for this visit.  GEN: This is a well developed, well-nourished male in no acute distress, in a pleasant mood.    SKIN: Focused examination of the right side of neck was performed.  -Villatoro skin type: I  -There is a tan to brown waxy stuck on papule with surrounding erythema on the base of the right neck. There is a similar appearing, non-inflamed, tan waxy stuck on papule near the lesion of primary concern.   -No other lesions of concern on areas examined.           Impression/Plan:    1. Seborrheic keratosis, inflamed  - Cryotherapy procedure note: After verbal consent and discussion of risks and benefits including but no limited  to dyspigmentation/scar, blister, and pain, 1 lesion at base of neck on right side was treated with 1-2mm freeze border for 3 cycles with liquid nitrogen. Post cryotherapy instructions were provided.  - 6 week virtual follow-up to confirm lesion resolved    Follow-up in 6 weeks, earlier for new or changing lesions.     Dr. Junior staffed the patient.    Staff Involved:  Resident(Ginger Chowdhury MD, Med PGY-2)/Staff    Staff Physician Comments:   I saw and evaluated the patient with the resident and I agree with the assessment and plan.  I was present for the entire minor procedure and examination.    Esau Junior MD  Pronouns: he/him/his    Department of Dermatology  [facility]: Phone: [phone], Fax:[phone]  [facility]: Phone: [phone] Fax: [phone]

## 2020-09-23 NOTE — PROGRESS NOTES
"  [facility] Dermatology Note      Dermatology Problem List:  1.  NMSC              -SCC, left (dorsal) forearm, s/p excision 8/18/2015              -SCC, right dorsal hand, s/p Mohs 3/18/2015  2. Compound nevus with moderate dysplasia, mid upper back              -s/p biopsy 10/16/2015  3. Actinic keratosis                -s/p cryotherapy 07/20/2016, 03/06/19              -efudex in November 2016 (used for 19 days), pt had a very robust response              -pt given hydrocortisone 2.5% cream BID PRN due to severe response and told to stop efudex  4. Unknown skin eruption penis. Resolved 7/5/17              -s/p triamcinolone cream initiated 3/17/2016              -s/p Nystatin initiated 3/17/2016              -s/p Mupirocin initiated 2/16/2015              -s/p Valtrex initiated 2/6/2015              -s/p biopsy showing hemorraghic scale crust and ulceration 2/3/2015  5. Drug exanthem, 2/2 to tamsulosin              -s/p biopsy showing interface dermatitis 12/29/2014              -s/p lidex initiated 1/8/2015              -s/p clobetasol initiated 12/29/2014-improved  6. Traumatic tattoo              -Central forehead, measured 1X1.5cm on 10/16/17  7. Rosacea              -Metronidazole cream    Encounter Date: Sep 23, 2020    CC:   Chief Complaint   Patient presents with     Derm Problem     Sammy is here today to have a spot on his neck looked at.          History of Present Illness:  Mr. Jacinto Flannery is a 80 year old male who presents for evaluation of right neck skin lesion. Patient describes the lesion as a stalk-like protuberance that was ~1/4 in in length. It was not painful, itchy or bleeding. Then, 2 days ago, the stalk broke off while patient was removing his t-shirt. He would like to have the base of the lesion examined. With his personal history of skin cancer, he did not want to wait until his next scheduled skin check. He states he has had other \"stalk-like\" lesion in the past " which have been evaluated on previous skin checks and were found to be benign . However, he has never had one these stalk-like lesions break off like this before.     Past Medical History:   Patient Active Problem List   Diagnosis     CAD S/P percutaneous coronary angioplasty     CAD (coronary artery disease)     BPH (benign prostatic hyperplasia)     Hyperlipidemia with target LDL less than 70     S/P coronary artery stent placement     GERD (gastroesophageal reflux disease)     S/P TURP     Dermatitis     AK (actinic keratosis)     SCC (squamous cell carcinoma)     SK (seborrheic keratosis)     EIC (epidermal inclusion cyst)     History of squamous cell carcinoma     Seborrheic keratosis     Inflamed seborrheic keratosis     History of dysplastic nevus     Varicose veins     Medication reaction, initial encounter     HSV (herpes simplex virus) infection     Hypertension     Insomnia     Thrombocytopenia (H)     Benign prostatic hyperplasia with urinary obstruction     Hypertrophy of prostate without urinary obstruction and other lower urinary tract symptoms (LUTS)     Atherosclerosis of coronary artery     Recurrent coronary arteriosclerosis after percutaneous transluminal coronary angioplasty     History of atypical nevus     Hyperlipidemia     History of transurethral resection of prostate     Presence of coronary angioplasty implant and graft     Squamous cell carcinoma of skin     Varicose veins of other specified sites     Past Medical History:   Diagnosis Date     BPH (benign prostatic hyperplasia)      CAD (coronary artery disease)     MI 1989, CABG 1990, 11/2014 PCI to LM, LCx and RCA     Conductive hearing loss Unknown.     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     Skin disease      Squamous cell carcinoma      Past Surgical History:   Procedure Laterality Date     C CABG, VEIN, THREE  1990    SVG-LAD in [place]     CHOLECYSTECTOMY  1992    in [place]     ENDOSCOPY  04/26/2013    Done at [facility] in [place], [place]     GENITOURINARY SURGERY       HEART CATH STENT COR W/WO PTCA  11/12/2014    x 4 ANKUR's (two ostial to mid-RCA, one ostial LCx and mid-LM, one ostial LAD)     HERNIA REPAIR Right 2010    in [place]     LASER KTP GREEN LIGHT PHOTOSELECTIVE VAPORIZATION PROSTATE N/A 1/5/2015    Procedure: LASER KTP GREEN LIGHT PHOTOSELECTIVE VAPORIZATION PROSTATE;  Surgeon: Natalie Porter MD;  Location: [facility] OR     MOHS MICROGRAPHIC PROCEDURE         Social History:  Patient reports that he quit smoking about 50 years ago. His smoking use included cigarettes. He started smoking about 55 years ago. He has a 0.50 pack-year smoking history. He has never used smokeless tobacco. He reports current alcohol use. He reports that he does not use drugs.    Family History:  Family History   Problem Relation Age of Onset     Cancer Father         Stomach     Heart Disease Mother      Heart Disease Maternal Grandmother      Skin Cancer No family hx of      Melanoma No family hx of        Medications:  Current Outpatient Medications   Medication Sig Dispense Refill     alfuzosin ER (UROXATRAL) 10 MG 24 hr tablet Take 1 tablet (10 mg) by mouth daily 90 tablet 3     aspirin 81 MG tablet Take 81 mg by mouth daily       carBAMazepine (EPITOL) 200 MG tablet Take 1 tablet (200 mg) by mouth 2 times daily 180 tablet 3     Cyanocobalamin (B-12) 1000 MCG CAPS Take 1 capsule by mouth daily        eszopiclone (LUNESTA) 2 MG tablet Take 1 tablet (2 mg) by mouth nightly as needed for sleep 15 tablet 2     melatonin 1 MG CAPS        metoprolol succinate ER  (TOPROL-XL) 25 MG 24 hr tablet TAKE 1/2 (ONE-HALF) TABLET BY MOUTH ONCE DAILY 45 tablet 3     metroNIDAZOLE (METROCREAM) 0.75 % external cream Apply topically 2 times daily 45 g 11     nitroGLYcerin (NITROSTAT) 0.4 MG sublingual tablet Place 1 tablet (0.4 mg) under the tongue every 5 minutes as needed for chest pain 25 tablet 3     simvastatin (ZOCOR) 40 MG tablet Take 1 tablet (40 mg) by mouth At Bedtime 90 tablet 3     valACYclovir (VALTREX) 1000 mg tablet Take 1 pill twice a day for 3 days at the first sign of a cold core. 6 tablet 3        Allergies   Allergen Reactions     Macrodantin [Nitrofurantoin] Rash     Sulfa Drugs Rash     Tamsulosin Rash         Review of Systems:  -Skin Establ Pt: The patient denies any new rash, pruritus, or lesions that are symptomatic, changing or bleeding, except as per HPI.  -Constitutional: Otherwise feeling well today, in usual state of health.  -HEENT: Patient denies nonhealing oral sores.  -Skin: As above in HPI. No additional skin concerns.    Physical exam:  Vitals: There were no vitals taken for this visit.  GEN: This is a well developed, well-nourished male in no acute distress, in a pleasant mood.    SKIN: Focused examination of the right side of neck was performed.  -Villatoro skin type: I  -There is a tan to brown waxy stuck on papule with surrounding erythema on the base of the right neck. There is a similar appearing, non-inflamed, tan waxy stuck on papule near the lesion of primary concern.   -No other lesions of concern on areas examined.           Impression/Plan:    1. Seborrheic keratosis, inflamed  - Cryotherapy procedure note: After verbal consent and discussion of risks and benefits including but no limited to dyspigmentation/scar, blister, and pain, 1 lesion at base of neck on right side was treated with 1-2mm freeze border for 3 cycles with liquid nitrogen. Post cryotherapy instructions were provided.  - 6 week virtual follow-up to confirm lesion  resolved    Follow-up in 6 weeks, earlier for new or changing lesions.     Dr. Junior staffed the patient.    Staff Involved:  Resident(Ginger Chowdhury MD, Med PGY-2)/Staff    Staff Physician Comments:   I saw and evaluated the patient with the resident and I agree with the assessment and plan.  I was present for the entire minor procedure and examination.    Esau Junior MD  Pronouns: he/him/his    Department of Dermatology  [facility]: Phone: [phone], Fax:[phone]  [facility]: Phone: [phone] Fax: [phone]

## 2020-11-02 ENCOUNTER — OFFICE VISIT (OUTPATIENT)
Dept: UROLOGY | Facility: CLINIC | Age: 80
End: 2020-11-02
Payer: MEDICARE

## 2020-11-02 DIAGNOSIS — R33.9 URINARY RETENTION: Primary | ICD-10-CM

## 2020-11-02 PROCEDURE — 51798 US URINE CAPACITY MEASURE: CPT

## 2020-11-02 NOTE — PROGRESS NOTES
Jacinto Flannery presented to the clinic with orders by Dr. Demian Pritchett to complete a post void residual. Patient voided as usual. PVR was 10mL.     Patient to follow up with Gill Melendez PA-C, in one year as planned.     Cecil Bess, EMT  11/02/20  10:09 AM

## 2020-11-03 ENCOUNTER — VIRTUAL VISIT (OUTPATIENT)
Dept: DERMATOLOGY | Facility: CLINIC | Age: 80
End: 2020-11-03
Payer: MEDICARE

## 2020-11-03 DIAGNOSIS — L82.0 INFLAMED SEBORRHEIC KERATOSIS: Primary | ICD-10-CM

## 2020-11-03 DIAGNOSIS — L82.1 SEBORRHEIC KERATOSIS: ICD-10-CM

## 2020-11-03 PROCEDURE — 99441 PR PHYSICIAN TELEPHONE EVALUATION 5-10 MIN: CPT | Mod: 95 | Performed by: DERMATOLOGY

## 2020-11-03 ASSESSMENT — PAIN SCALES - GENERAL: PAINLEVEL: NO PAIN (0)

## 2020-11-03 NOTE — PROGRESS NOTES
"[facility] Dermatology Record:  Store and Forward and Telephone [phone]      Dermatology Problem List:  1.  NMSC              -SCC, left (dorsal) forearm, s/p excision 8/18/2015              -SCC, right dorsal hand, s/p Mohs 3/18/2015  2. Compound nevus with moderate dysplasia, mid upper back              -s/p biopsy 10/16/2015  3. Actinic keratosis                -s/p cryotherapy 07/20/2016, 03/06/19              -efudex in November 2016 (used for 19 days), pt had a very robust response              -pt given hydrocortisone 2.5% cream BID PRN due to severe response and told to stop efudex  4. Unknown skin eruption penis. Resolved 7/5/17              -s/p triamcinolone cream initiated 3/17/2016              -s/p Nystatin initiated 3/17/2016              -s/p Mupirocin initiated 2/16/2015              -s/p Valtrex initiated 2/6/2015              -s/p biopsy showing hemorraghic scale crust and ulceration 2/3/2015  5. Drug exanthem, 2/2 to tamsulosin              -s/p biopsy showing interface dermatitis 12/29/2014              -s/p lidex initiated 1/8/2015              -s/p clobetasol initiated 12/29/2014-improved  6. Traumatic tattoo              -Central forehead, measured 1X1.5cm on 10/16/17  7. Rosacea              -Metronidazole cream    Encounter Date: Nov 3, 2020    CC:   Chief Complaint   Patient presents with     Derm Problem     Sammy is following up on an inflammed SK- Sammy states \"there appears to be no spot there\".        History of Present Illness:  Jacinto Flannery is a 80 year old male who presents as follow-up for an inflamed SK on the right neck. Last seen on 9/23/2020 when had cryotherapy for iSK on the R neck. Given history of NMSC, recommended follow-up in 6 weeks to assess for resolution.    Today, patient reports the spot on the right neck has resolved. No associated pain, tenderness or bleeding. The patient otherwise denies any new or concerning lesions. No bleeding, painful, pruritic, or changing " lesions. Health otherwise stable. No other skin concerns.     ROS: Patient is generally feeling well today.    Physical Examination:  General: Well-appearing male, appropriately-developed individual.  Skin: Focused examination including right neck was performed.   - Few scattered brown, waxy, stuck-on appearing papules on jawline/neck. Lesion on the right neck in comparison to photo taken 9/23/2020 appears resolved.         Labs:  None.     Past Medical History:   Patient Active Problem List   Diagnosis     CAD S/P percutaneous coronary angioplasty     CAD (coronary artery disease)     BPH (benign prostatic hyperplasia)     Hyperlipidemia with target LDL less than 70     S/P coronary artery stent placement     GERD (gastroesophageal reflux disease)     S/P TURP     Dermatitis     AK (actinic keratosis)     SCC (squamous cell carcinoma)     SK (seborrheic keratosis)     EIC (epidermal inclusion cyst)     History of squamous cell carcinoma     Seborrheic keratosis     Inflamed seborrheic keratosis     History of dysplastic nevus     Varicose veins     Medication reaction, initial encounter     HSV (herpes simplex virus) infection     Hypertension     Insomnia     Thrombocytopenia (H)     Benign prostatic hyperplasia with urinary obstruction     Hypertrophy of prostate without urinary obstruction and other lower urinary tract symptoms (LUTS)     Atherosclerosis of coronary artery     Recurrent coronary arteriosclerosis after percutaneous transluminal coronary angioplasty     History of atypical nevus     Hyperlipidemia     History of transurethral resection of prostate     Presence of coronary angioplasty implant and graft     Squamous cell carcinoma of skin     Varicose veins of other specified sites     Past Medical History:   Diagnosis Date     BPH (benign prostatic hyperplasia)      CAD (coronary artery disease)     MI 1989, CABG 1990, 11/2014 PCI to LM, LCx and RCA     Conductive hearing loss Unknown.     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     Skin disease      Squamous cell carcinoma      Past Surgical History:   Procedure Laterality Date     C CABG, VEIN, THREE  1990    SVG-LAD in [place]     CHOLECYSTECTOMY  1992    in [place]     ENDOSCOPY  04/26/2013    Done at [facility] in [place], [place]     GENITOURINARY SURGERY       HEART CATH STENT COR W/WO PTCA  11/12/2014    x 4 ANKUR's (two ostial to mid-RCA, one ostial LCx and mid-LM, one ostial LAD)     HERNIA REPAIR Right 2010    in [place]     LASER KTP GREEN LIGHT PHOTOSELECTIVE VAPORIZATION PROSTATE N/A 1/5/2015    Procedure: LASER KTP GREEN LIGHT PHOTOSELECTIVE VAPORIZATION PROSTATE;  Surgeon: Natalie Porter MD;  Location: [facility] OR     MOHS MICROGRAPHIC PROCEDURE         Social History:  Patient reports that he quit smoking about 50 years ago. His smoking use included cigarettes. He started smoking about 55 years ago. He has a 0.50 pack-year smoking history. He has never used smokeless tobacco. He reports current alcohol use. He reports that he does not use drugs.    Family History:  Family History   Problem Relation Age of Onset     Cancer Father         Stomach     Heart Disease Mother      Heart Disease Maternal Grandmother      Skin Cancer No family hx of      Melanoma No family hx of        Medications:  Current Outpatient Medications   Medication     alfuzosin ER (UROXATRAL) 10 MG 24 hr tablet     aspirin 81 MG tablet     carBAMazepine (EPITOL) 200 MG tablet     Cyanocobalamin (B-12) 1000 MCG CAPS     eszopiclone (LUNESTA) 2 MG tablet     melatonin 1 MG CAPS      metoprolol succinate ER (TOPROL-XL) 25 MG 24 hr tablet     metroNIDAZOLE (METROCREAM) 0.75 % external cream     nitroGLYcerin (NITROSTAT) 0.4 MG sublingual tablet     simvastatin (ZOCOR) 40 MG tablet     valACYclovir (VALTREX) 1000 mg tablet     Current Facility-Administered Medications   Medication     lidocaine 1% with EPINEPHrine 1:100,000 injection 0.5 mL          Allergies   Allergen Reactions     Macrodantin [Nitrofurantoin] Rash     Sulfa Drugs Rash     Tamsulosin Rash       Impression and Recommendations (Patient Counseled on the Following):    1. Inflamed SK, R neck. S/p cryotherapy 9/2020. Appears resolved today without any evidence of recurrence. Recommended continue regular skin examinations, next in 1/2020 with Dr. Quarles and would recheck spot in-person at that visit to ensure complete resolution.     2. Seborrheic keratoses. Benign nature discussed. No further treatment needed.     Follow-up:   Follow-up with dermatology in approximately 2 months. Earlier for new or changing lesions or rash.      Staff only    Esau Junior MD  Pronouns: he/him/his    Department of Dermatology  [facility]: Phone: [phone], Fax:[phone]  [facility]: Phone: [phone] Fax: [phone]    _____________________________________________________________________________    Teledermatology information:  - Location of patient: [place]  - Patient presented as: return  - Location of teledermatologist:  ([facility]ER[facility] [place] )  - Reason teledermatology is appropriate:  of National Emergency Regarding Coronavirus disease (COVID 19) Outbreak  - Image quality and interpretability: acceptable  - Physician has received verbal consent for a Video/Photos Visit from the patient? YES  - In-person dermatology visit recommendation: no  - Date of images: 11/3/2020  - Service  start time: 3:00 PM  - Service end time: 3:07 PM  - Date of report: 11/3/2020

## 2020-11-03 NOTE — LETTER
"11/3/2020       RE: Jacinto Flannery  [address]  [place] [address]     Dear Colleague,    Thank you for referring your patient, Jacinto Flannery, to the [facility]MAT[facility] [place] [facility]. Please see a copy of my visit note below.    [facility] Dermatology Record:  Store and Forward and Telephone [phone]      Dermatology Problem List:  1.  NMSC              -SCC, left (dorsal) forearm, s/p excision 8/18/2015              -SCC, right dorsal hand, s/p Mohs 3/18/2015  2. Compound nevus with moderate dysplasia, mid upper back              -s/p biopsy 10/16/2015  3. Actinic keratosis                -s/p cryotherapy 07/20/2016, 03/06/19              -efudex in November 2016 (used for 19 days), pt had a very robust response              -pt given hydrocortisone 2.5% cream BID PRN due to severe response and told to stop efudex  4. Unknown skin eruption penis. Resolved 7/5/17              -s/p triamcinolone cream initiated 3/17/2016              -s/p Nystatin initiated 3/17/2016              -s/p Mupirocin initiated 2/16/2015              -s/p Valtrex initiated 2/6/2015              -s/p biopsy showing hemorraghic scale crust and ulceration 2/3/2015  5. Drug exanthem, 2/2 to tamsulosin              -s/p biopsy showing interface dermatitis 12/29/2014              -s/p lidex initiated 1/8/2015              -s/p clobetasol initiated 12/29/2014-improved  6. Traumatic tattoo              -Central forehead, measured 1X1.5cm on 10/16/17  7. Rosacea              -Metronidazole cream    Encounter Date: Nov 3, 2020    CC:   Chief Complaint   Patient presents with     Derm Problem     Sammy is following up on an inflammed SK- Sammy states \"there appears to be no spot there\".        History of Present Illness:  Jacinto Flannery is a 80 year old male who presents as follow-up for an inflamed SK on the right neck. Last seen on 9/23/2020 when had cryotherapy for iSK on the R neck. " Given history of NMSC, recommended follow-up in 6 weeks to assess for resolution.    Today, patient reports the spot on the right neck has resolved. No associated pain, tenderness or bleeding. The patient otherwise denies any new or concerning lesions. No bleeding, painful, pruritic, or changing lesions. Health otherwise stable. No other skin concerns.     ROS: Patient is generally feeling well today.    Physical Examination:  General: Well-appearing male, appropriately-developed individual.  Skin: Focused examination including right neck was performed.   - Few scattered brown, waxy, stuck-on appearing papules on jawline/neck. Lesion on the right neck in comparison to photo taken 9/23/2020 appears resolved.         Labs:  None.     Past Medical History:   Patient Active Problem List   Diagnosis     CAD S/P percutaneous coronary angioplasty     CAD (coronary artery disease)     BPH (benign prostatic hyperplasia)     Hyperlipidemia with target LDL less than 70     S/P coronary artery stent placement     GERD (gastroesophageal reflux disease)     S/P TURP     Dermatitis     AK (actinic keratosis)     SCC (squamous cell carcinoma)     SK (seborrheic keratosis)     EIC (epidermal inclusion cyst)     History of squamous cell carcinoma     Seborrheic keratosis     Inflamed seborrheic keratosis     History of dysplastic nevus     Varicose veins     Medication reaction, initial encounter     HSV (herpes simplex virus) infection     Hypertension     Insomnia     Thrombocytopenia (H)     Benign prostatic hyperplasia with urinary obstruction     Hypertrophy of prostate without urinary obstruction and other lower urinary tract symptoms (LUTS)     Atherosclerosis of coronary artery     Recurrent coronary arteriosclerosis after percutaneous transluminal coronary angioplasty     History of atypical nevus     Hyperlipidemia     History of transurethral resection of prostate     Presence of coronary angioplasty implant and graft      Squamous cell carcinoma of skin     Varicose veins of other specified sites     Past Medical History:   Diagnosis Date     BPH (benign prostatic hyperplasia)      CAD (coronary artery disease)     MI 1989, CABG 1990, 11/2014 PCI to LM, LCx and RCA     Conductive hearing loss Unknown.     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     Skin disease      Squamous cell carcinoma      Past Surgical History:   Procedure Laterality Date     C CABG, VEIN, THREE  1990    SVG-LAD in [place]     CHOLECYSTECTOMY  1992    in [place]     ENDOSCOPY  04/26/2013    Done at [facility] in [place], [place]     GENITOURINARY SURGERY       HEART CATH STENT COR W/WO PTCA  11/12/2014    x 4 ANKUR's (two ostial to mid-RCA, one ostial LCx and mid-LM, one ostial LAD)     HERNIA REPAIR Right 2010    in [place]     LASER KTP GREEN LIGHT PHOTOSELECTIVE VAPORIZATION PROSTATE N/A 1/5/2015    Procedure: LASER KTP GREEN LIGHT PHOTOSELECTIVE VAPORIZATION PROSTATE;  Surgeon: Natalie Porter MD;  Location: [facility] OR     MOHS MICROGRAPHIC PROCEDURE         Social History:  Patient reports that he quit smoking about 50 years ago. His smoking use included cigarettes. He started smoking about 55 years ago. He has a 0.50 pack-year smoking history. He has never used smokeless tobacco. He reports current alcohol use. He reports that he does not use drugs.    Family History:  Family History   Problem Relation Age of Onset     Cancer Father         Stomach     Heart Disease Mother      Heart Disease Maternal Grandmother      Skin Cancer No family hx of      Melanoma No family hx of         Medications:  Current Outpatient Medications   Medication     alfuzosin ER (UROXATRAL) 10 MG 24 hr tablet     aspirin 81 MG tablet     carBAMazepine (EPITOL) 200 MG tablet     Cyanocobalamin (B-12) 1000 MCG CAPS     eszopiclone (LUNESTA) 2 MG tablet     melatonin 1 MG CAPS     metoprolol succinate ER (TOPROL-XL) 25 MG 24 hr tablet     metroNIDAZOLE (METROCREAM) 0.75 % external cream     nitroGLYcerin (NITROSTAT) 0.4 MG sublingual tablet     simvastatin (ZOCOR) 40 MG tablet     valACYclovir (VALTREX) 1000 mg tablet     Current Facility-Administered Medications   Medication     lidocaine 1% with EPINEPHrine 1:100,000 injection 0.5 mL          Allergies   Allergen Reactions     Macrodantin [Nitrofurantoin] Rash     Sulfa Drugs Rash     Tamsulosin Rash       Impression and Recommendations (Patient Counseled on the Following):    1. Inflamed SK, R neck. S/p cryotherapy 9/2020. Appears resolved today without any evidence of recurrence. Recommended continue regular skin examinations, next in 1/2020 with Dr. Quarles and would recheck spot in-person at that visit to ensure complete resolution.     2. Seborrheic keratoses. Benign nature discussed. No further treatment needed.     Follow-up:   Follow-up with dermatology in approximately 2 months. Earlier for new or changing lesions or rash.      Staff only    Esau Junior MD  Pronouns: he/him/his    Department of Dermatology  [facility]: Phone: [phone], Fax:[phone]  [facility]: Phone: [phone] Fax: [phone]    _____________________________________________________________________________    Teledermatology information:  - Location of patient: [place]  - Patient presented as: return  - Location of teledermatologist:  ([facility]ERMAT[facility] [place] )  - Reason teledermatology is appropriate:  of National  Emergency Regarding Coronavirus disease (COVID 19) Outbreak  - Image quality and interpretability: acceptable  - Physician has received verbal consent for a Video/Photos Visit from the patient? YES  - In-person dermatology visit recommendation: no  - Date of images: 11/3/2020  - Service start time: 3:00 PM  - Service end time: 3:07 PM  - Date of report: 11/3/2020

## 2020-11-03 NOTE — PATIENT INSTRUCTIONS
[facility] Dermatology Visit    Thank you for allowing us to participate in your care. Your findings, instructions and follow-up plan are as follows:    Seborrheic keratosis, resolved.     Please follow-up with Dr. Quarles in January as planned for another skin check.     When should I call my doctor?    If you are worsening or not improving, please, contact us or seek urgent care as noted below.     Who should I call with questions (adults)?    [facility] (adult and pediatric): [phone]     [facility] (adult): [phone]    For urgent needs outside of business hours call the [facility] at [phone] and ask for the dermatology resident on call    If this is a medical emergency and you are unable to reach an ER, Call 911      Who should I call with questions (pediatric)?  [facility]- Pediatric Dermatology  Dr. Maryuri Bernal, Dr. Dillan Nicole, Dr. Marlin Wilson, Della Kapoor, PA  Dr. Azalea Harp, Dr. Natalie Nielsen & Dr. Mata Gonzalez  Non Urgent  Nurse Triage Line; [phone]- Hortensia and Laura RN Care Coordinators   Xochitl (/Complex ) [phone]    If you need a prescription refill, please contact your pharmacy. Refills are approved or denied by our Physicians during normal business hours, Monday through Fridays  Per office policy, refills will not be granted if you have not been seen within the past year (or sooner depending on your child's condition)    Scheduling Information:  Pediatric Appointment Scheduling and Call Center [phone]  Radiology Scheduling- [phone]  Sedation Unit Scheduling- [phone]  [place] Scheduling- General [phone]; Pediatric Dermatology [phone]  Main  Services: [phone]  Khmer: [phone]  Omani: [phone]  Hmong/Jin/Beto: [phone]  Preadmission Nursing Department Fax  Number: [phone] (Fax all pre-operative paperwork to this number)    For urgent matters arising during evenings, weekends, or holidays that cannot wait for normal business hours please call [phone] and ask for the Dermatology Resident On-Call to be paged.

## 2020-11-03 NOTE — NURSING NOTE
"Dermatology Rooming Note    Jacinto Flannery's goals for this visit include:   Chief Complaint   Patient presents with     Derm Problem     Sammy is following up on an inflammed SK- Sammy states \"there appears to be no spot there\".      Sola Shaikh, RMBILL     "

## 2020-11-19 NOTE — TELEPHONE ENCOUNTER
Problem: Pain  Goal: #Acceptable pain level achieved/maintained at rest using NRS/Faces  Description: This goal is used for patients who can self-report.  Acceptable means the level is at or below the identified comfort/function goal.  Outcome: Outcome Met, Continue evaluating goal progress toward completion  Goal: # Acceptable pain level achieved/maintained at rest using NRS/Faces without oversedation (opioid naive or PCA/Epidural infusion)  Description: This goal is used if Opioid-naïve or on PCA/Epidural Infusion.  Outcome: Outcome Met, Continue evaluating goal progress toward completion  Goal: # Acceptable pain level achieved/maintained with activity using NRS/Faces  Description: This goal is used for patients who can self-report and are not achieving acceptable pain control during activity.  Outcome: Outcome Met, Continue evaluating goal progress toward completion  Goal: Acceptable pain/comfort level is achieved/maintained at rest (based on Pain Behaviors Scale)  Description: This goal is used for patients who are not able to self-report pain and are assessed for pain using the Pain Behaviors Scale  Outcome: Outcome Met, Continue evaluating goal progress toward completion  Goal: Acceptable pain/comfort level is achieved/maintained at rest based on PAINAID scale (Dementia)  Description: This goal is used for patients who are not able to self-report pain, have dementia, and assessed using the PAINAD scale.  Outcome: Outcome Met, Continue evaluating goal progress toward completion  Goal: Acceptable pain/comfort level is achieved/maintained at rest (based on pediatric behavior tool: NIPS, NPASS, or FLACC)  Description: This goal is used for pediatric patients who are not able to self report pain.  Outcome: Outcome Met, Continue evaluating goal progress toward completion  Goal: # Verbalizes understanding of pain management  Description: Documented in Patient Education Activity  Outcome: Outcome Met, Continue  Urinary retention follow up.  Former Dr. Porter patient.  Records available in [facility].   evaluating goal progress toward completion  Goal: Verbalizes understanding and effective use of Patient Controlled Analgesia (PCA)  Description: Documented in Patient Education Activity  This goal is used for patients with PCA  Outcome: Outcome Met, Continue evaluating goal progress toward completion  Goal: Maximum comfort achieved/maintained at end of life (Hospice)  Outcome: Outcome Met, Continue evaluating goal progress toward completion

## 2020-12-06 ENCOUNTER — HEALTH MAINTENANCE LETTER (OUTPATIENT)
Age: 80
End: 2020-12-06

## 2020-12-16 ENCOUNTER — MYC MEDICAL ADVICE (OUTPATIENT)
Dept: INTERNAL MEDICINE | Facility: CLINIC | Age: 80
End: 2020-12-16

## 2020-12-16 DIAGNOSIS — I25.10 CAD (CORONARY ARTERY DISEASE): ICD-10-CM

## 2020-12-16 DIAGNOSIS — E78.5 HYPERLIPIDEMIA WITH TARGET LDL LESS THAN 70: Primary | ICD-10-CM

## 2020-12-18 DIAGNOSIS — G47.00 PERSISTENT INSOMNIA: ICD-10-CM

## 2020-12-22 DIAGNOSIS — E78.5 HYPERLIPIDEMIA WITH TARGET LDL LESS THAN 70: ICD-10-CM

## 2020-12-22 DIAGNOSIS — I25.10 CAD (CORONARY ARTERY DISEASE): ICD-10-CM

## 2020-12-22 LAB
ALBUMIN SERPL-MCNC: 4.4 G/DL (ref 3.4–5)
ALP SERPL-CCNC: 62 U/L (ref 40–150)
ALT SERPL W P-5'-P-CCNC: 28 U/L (ref 0–70)
ANION GAP SERPL CALCULATED.3IONS-SCNC: 6 MMOL/L (ref 3–14)
AST SERPL W P-5'-P-CCNC: 25 U/L (ref 0–45)
BILIRUB SERPL-MCNC: 0.8 MG/DL (ref 0.2–1.3)
BUN SERPL-MCNC: 15 MG/DL (ref 7–30)
CALCIUM SERPL-MCNC: 9.2 MG/DL (ref 8.5–10.1)
CHLORIDE SERPL-SCNC: 106 MMOL/L (ref 94–109)
CHOLEST SERPL-MCNC: 156 MG/DL
CO2 SERPL-SCNC: 29 MMOL/L (ref 20–32)
CREAT SERPL-MCNC: 0.89 MG/DL (ref 0.66–1.25)
GFR SERPL CREATININE-BSD FRML MDRD: 82 ML/MIN/{1.73_M2}
GLUCOSE SERPL-MCNC: 90 MG/DL (ref 70–99)
HDLC SERPL-MCNC: 50 MG/DL
LDLC SERPL CALC-MCNC: 79 MG/DL
NONHDLC SERPL-MCNC: 105 MG/DL
POTASSIUM SERPL-SCNC: 4 MMOL/L (ref 3.4–5.3)
PROT SERPL-MCNC: 7.6 G/DL (ref 6.8–8.8)
SODIUM SERPL-SCNC: 141 MMOL/L (ref 133–144)
TRIGL SERPL-MCNC: 134 MG/DL

## 2020-12-22 PROCEDURE — 36415 COLL VENOUS BLD VENIPUNCTURE: CPT | Performed by: PATHOLOGY

## 2020-12-22 PROCEDURE — 80061 LIPID PANEL: CPT | Performed by: PATHOLOGY

## 2020-12-22 PROCEDURE — 80053 COMPREHEN METABOLIC PANEL: CPT | Performed by: PATHOLOGY

## 2020-12-22 NOTE — TELEPHONE ENCOUNTER
Eszopiclone 2 MG Oral Tablet  Last Written Prescription Date:  1/31/2020  Last Fill Quantity: 15,   # refills: 2  Last Office Visit : 1/31/2020  Future Office visit:  12/30/2020    Routing refill request to provider for review/approval because:  Drug not on the [facility], [facility] or [facility] refill protocol or controlled substance      Hnia Nguyen RN  Central Triage Red Flags/Med Refills

## 2020-12-28 RX ORDER — ESZOPICLONE 2 MG/1
2 TABLET, FILM COATED ORAL
Qty: 15 TABLET | Refills: 5 | Status: SHIPPED | OUTPATIENT
Start: 2020-12-28 | End: 2022-01-18

## 2020-12-30 ENCOUNTER — VIRTUAL VISIT (OUTPATIENT)
Dept: INTERNAL MEDICINE | Facility: CLINIC | Age: 80
End: 2020-12-30
Payer: MEDICARE

## 2020-12-30 DIAGNOSIS — I25.10 CORONARY ARTERY DISEASE INVOLVING NATIVE CORONARY ARTERY OF NATIVE HEART WITHOUT ANGINA PECTORIS: ICD-10-CM

## 2020-12-30 DIAGNOSIS — I25.812 CORONARY ARTERY DISEASE INVOLVING BYPASS GRAFT OF TRANSPLANTED HEART WITHOUT ANGINA PECTORIS: ICD-10-CM

## 2020-12-30 PROCEDURE — 99443 PR PHYSICIAN TELEPHONE EVALUATION 21-30 MIN: CPT | Mod: 95 | Performed by: INTERNAL MEDICINE

## 2020-12-30 RX ORDER — SIMVASTATIN 40 MG
40 TABLET ORAL AT BEDTIME
Qty: 90 TABLET | Refills: 3 | Status: SHIPPED | OUTPATIENT
Start: 2020-12-30 | End: 2021-10-15

## 2020-12-30 RX ORDER — METOPROLOL SUCCINATE 25 MG/1
TABLET, EXTENDED RELEASE ORAL
Qty: 45 TABLET | Refills: 3 | Status: SHIPPED | OUTPATIENT
Start: 2020-12-30 | End: 2021-10-15

## 2020-12-30 NOTE — PROGRESS NOTES
"Jacinto Flannery is a 80 year old male who is being evaluated via a billable telephone visit.      The patient has been notified of following:     \"This telephone visit will be conducted via a call between you and your physician/provider. We have found that certain health care needs can be provided without the need for a physical exam.  This service lets us provide the care you need with a short phone conversation.  If a prescription is necessary we can send it directly to your pharmacy.  If lab work is needed we can place an order for that and you can then stop by our lab to have the test done at a later time.    Telephone visits are billed at different rates depending on your insurance coverage. During this emergency period, for some insurers they may be billed the same as an in-person visit.  Please reach out to your insurance provider with any questions.    If during the course of the call the physician/provider feels a telephone visit is not appropriate, you will not be charged for this service.\"    Patient has given verbal consent for Telephone visit?  Yes    What phone number would you like to be contacted at? home    How would you like to obtain your AVS? 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   Wants to review recent labs and discuss home BP readings as well as med " "refills.    Current Outpatient Medications:      alfuzosin ER (UROXATRAL) 10 MG 24 hr tablet, Take 1 tablet (10 mg) by mouth daily, Disp: 90 tablet, Rfl: 3     aspirin 81 MG tablet, Take 81 mg by mouth daily, Disp: , Rfl:      carBAMazepine (EPITOL) 200 MG tablet, Take 1 tablet (200 mg) by mouth 2 times daily, Disp: 180 tablet, Rfl: 3     Cyanocobalamin (B-12) 1000 MCG CAPS, Take 1 capsule by mouth daily , Disp: , Rfl:      eszopiclone (LUNESTA) 2 MG tablet, Take 1 tablet (2 mg) by mouth nightly as needed for sleep, Disp: 15 tablet, Rfl: 5     melatonin 1 MG CAPS, , Disp: , Rfl:      metoprolol succinate ER (TOPROL-XL) 25 MG 24 hr tablet, TAKE 1/2 (ONE-HALF) TABLET BY MOUTH ONCE DAILY, Disp: 45 tablet, Rfl: 3     metroNIDAZOLE (METROCREAM) 0.75 % external cream, Apply topically 2 times daily, Disp: 45 g, Rfl: 11     nitroGLYcerin (NITROSTAT) 0.4 MG sublingual tablet, Place 1 tablet (0.4 mg) under the tongue every 5 minutes as needed for chest pain, Disp: 25 tablet, Rfl: 3     simvastatin (ZOCOR) 40 MG tablet, Take 1 tablet (40 mg) by mouth At Bedtime, Disp: 90 tablet, Rfl: 3     valACYclovir (VALTREX) 1000 mg tablet, Take 1 pill twice a day for 3 days at the first sign of a cold core., Disp: 6 tablet, Rfl: 3    Current Facility-Administered Medications:      lidocaine 1% with EPINEPHrine 1:100,000 injection 0.5 mL, 0.5 mL, Intradermal, Once, Anjelica Quarles MD        HPI     Cholesterol profile is quite favorable; LDL goal less than 100.  Metabolic panel also normal.  Last week had an episode of dizzy spells when getting up out of bed \"sideways\".  If he sits straight up first that seemed to fix the problem.  Is trying to be proactive on hydration.  I also mentioned BPPV as a potential cause.  At times, he feels his knees are a bit \"wobbly\" or having some difficulty with balance and coordination and walking.  We discussed osteoarthritis/DJD plus minus peripheral neuropathy as contributing factors; however, he " has no evidence of diabetes based on labs.  Saw Urology recently, urinary symptoms are minimal (stable once nightly nocturia), he got a refill of alfuzosin.  Mental health is holding up, despite pandemic restrictions and stress.  Stayed in [facility] for a month and was able to hike trails and enjoy nature; will go back in February. We discussed precautions to take but overall I think this travel will do him more good than harm/risk.      Review of Systems    ROS: 10 point ROS neg other than the symptoms noted above in the HPI.       Objective       Home BP readings have been in the 120s/70s-80s generally.  A little higher than previous, but still in the normal range.      Vitals:  No vitals were obtained today due to virtual visit.    healthy, alert and no distress  PSYCH: Alert and oriented times 3; coherent speech, normal   rate and volume, able to articulate logical thoughts, able   to abstract reason, no tangential thoughts, no hallucinations   or delusions  His affect is normal  RESP: No cough, no audible wheezing, able to talk in full sentences  Remainder of exam unable to be completed due to telephone visits    Labs reviewed in Epic.        Assessment/Plan:    Jacinto was seen today for results and recheck medication.  Given he is doing so well, I feel he can postpone his Wellness visit to the fall of 2021, which is when he prefers to schedule these visits.  He already had labs, we reviewed BP and also, has f/u with Cardiology forthcoming.    Coronary artery disease involving native coronary artery of native heart without angina pectoris  -     Refill simvastatin (ZOCOR) 40 MG tablet; Take 1 tablet (40 mg) by mouth At Bedtime    Coronary artery disease involving bypass graft of transplanted heart without angina pectoris  -    Refill metoprolol succinate ER (TOPROL-XL) 25 MG 24 hr tablet; TAKE 1/2 (ONE-HALF) TABLET BY MOUTH ONCE DAILY    RTC Sept 2021 for wellness exam    Phone call duration:  22  minutes        Nadine Orozco MD

## 2020-12-30 NOTE — NURSING NOTE
Chief Complaint   Patient presents with     Results     pt would like to discuss recent lab results, lipid, metabolic panel     Recheck Medication     pt would like to discuss bp readings and wobbly when walking       Rafiq Blake CMA, EMT at 3:02 PM on 12/30/2020.

## 2021-01-06 ENCOUNTER — OFFICE VISIT (OUTPATIENT)
Dept: DERMATOLOGY | Facility: CLINIC | Age: 81
End: 2021-01-06
Payer: MEDICARE

## 2021-01-06 DIAGNOSIS — L57.0 AK (ACTINIC KERATOSIS): Primary | ICD-10-CM

## 2021-01-06 DIAGNOSIS — L82.1 SK (SEBORRHEIC KERATOSIS): ICD-10-CM

## 2021-01-06 DIAGNOSIS — D22.9 MULTIPLE NEVI: ICD-10-CM

## 2021-01-06 PROCEDURE — 99213 OFFICE O/P EST LOW 20 MIN: CPT | Mod: 25 | Performed by: DERMATOLOGY

## 2021-01-06 PROCEDURE — 17000 DESTRUCT PREMALG LESION: CPT | Mod: GC | Performed by: DERMATOLOGY

## 2021-01-06 ASSESSMENT — PAIN SCALES - GENERAL: PAINLEVEL: NO PAIN (0)

## 2021-01-06 NOTE — PROGRESS NOTES
[facility] Dermatology Note      Dermatology Problem List:  1.  NMSC              -SCC, left (dorsal) forearm, s/p excision 8/18/2015              -SCC, right dorsal hand, s/p Mohs 3/18/2015  2. Compound nevus with moderate dysplasia, mid upper back              -s/p biopsy 10/16/2015  3. Actinic keratosis                -s/p cryotherapy 07/20/2016, 03/06/19              -efudex in November 2016 (used for 19 days), pt had a very robust response              -pt given hydrocortisone 2.5% cream BID PRN due to severe response and told to stop efudex  4. Unknown skin eruption penis. Resolved 7/5/17              -s/p triamcinolone cream initiated 3/17/2016              -s/p Nystatin initiated 3/17/2016              -s/p Mupirocin initiated 2/16/2015              -s/p Valtrex initiated 2/6/2015              -s/p biopsy showing hemorraghic scale crust and ulceration 2/3/2015  5. Drug exanthem, 2/2 to tamsulosin              -s/p biopsy showing interface dermatitis 12/29/2014              -s/p lidex initiated 1/8/2015              -s/p clobetasol initiated 12/29/2014-improved  6. Traumatic tattoo              -Central forehead, measured 1X1.5cm on 10/16/17  7. Rosacea              -Metronidazole cream    Encounter Date: Jan 6, 2021    CC:   Chief Complaint   Patient presents with     Skin Check     Sammy is here today for a skin check. He is concerned about a spot above the left eye brow         History of Present Illness:  Mr. Jacinto Flannery is a 80 year old male who presents for 6 month skin check, concern for new dry spot above his left eyebrow. The patient was last seen 11/03/2020 when he had a virtual visit for follow up on cryotherapy for SK on right neck.   New dry skin lesion above left eyebrow, does not hurt, does not bleed.     Past Medical History:   Patient Active Problem List   Diagnosis     CAD S/P percutaneous coronary angioplasty     CAD (coronary artery disease)     BPH (benign prostatic  hyperplasia)     Hyperlipidemia with target LDL less than 70     S/P coronary artery stent placement     GERD (gastroesophageal reflux disease)     S/P TURP     Dermatitis     AK (actinic keratosis)     SCC (squamous cell carcinoma)     SK (seborrheic keratosis)     EIC (epidermal inclusion cyst)     History of squamous cell carcinoma     Seborrheic keratosis     Inflamed seborrheic keratosis     History of dysplastic nevus     Varicose veins     Medication reaction, initial encounter     HSV (herpes simplex virus) infection     Hypertension     Insomnia     Thrombocytopenia (H)     Benign prostatic hyperplasia with urinary obstruction     Hypertrophy of prostate without urinary obstruction and other lower urinary tract symptoms (LUTS)     Atherosclerosis of coronary artery     Recurrent coronary arteriosclerosis after percutaneous transluminal coronary angioplasty     History of atypical nevus     Hyperlipidemia     History of transurethral resection of prostate     Presence of coronary angioplasty implant and graft     Squamous cell carcinoma of skin     Varicose veins of other specified sites     Past Medical History:   Diagnosis Date     BPH (benign prostatic hyperplasia)      CAD (coronary artery disease)     MI 1989, CABG 1990, 11/2014 PCI to LM, LCx and RCA     Conductive hearing loss Unknown.     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     Skin disease      Squamous cell carcinoma      Past Surgical History:   Procedure Laterality Date     C CABG, VEIN, THREE  1990    SVG-LAD in [place]     CHOLECYSTECTOMY  1992    in [place]     ENDOSCOPY   04/26/2013    Done at [facility] in [place], [place]     GENITOURINARY SURGERY       HEART CATH STENT COR W/WO PTCA  11/12/2014    x 4 ANKUR's (two ostial to mid-RCA, one ostial LCx and mid-LM, one ostial LAD)     HERNIA REPAIR Right 2010    in [place]     LASER KTP GREEN LIGHT PHOTOSELECTIVE VAPORIZATION PROSTATE N/A 1/5/2015    Procedure: LASER KTP GREEN LIGHT PHOTOSELECTIVE VAPORIZATION PROSTATE;  Surgeon: Natalie Porter MD;  Location: [facility] OR     MOHS MICROGRAPHIC PROCEDURE         Social History:  Patient reports that he quit smoking about 50 years ago. His smoking use included cigarettes. He started smoking about 55 years ago. He has a 0.50 pack-year smoking history. He has never used smokeless tobacco. He reports current alcohol use. He reports that he does not use drugs.    Family History:  Family History   Problem Relation Age of Onset     Cancer Father         Stomach     Heart Disease Mother      Heart Disease Maternal Grandmother      Skin Cancer No family hx of      Melanoma No family hx of        Medications:  Current Outpatient Medications   Medication Sig Dispense Refill     alfuzosin ER (UROXATRAL) 10 MG 24 hr tablet Take 1 tablet (10 mg) by mouth daily 90 tablet 3     aspirin 81 MG tablet Take 81 mg by mouth daily       carBAMazepine (EPITOL) 200 MG tablet Take 1 tablet (200 mg) by mouth 2 times daily 180 tablet 3     Cyanocobalamin (B-12) 1000 MCG CAPS Take 1 capsule by mouth daily        eszopiclone (LUNESTA) 2 MG tablet Take 1 tablet (2 mg) by mouth nightly as needed for sleep 15 tablet 5     melatonin 1 MG CAPS        metoprolol succinate ER (TOPROL-XL) 25 MG 24 hr tablet TAKE 1/2 (ONE-HALF) TABLET BY MOUTH ONCE DAILY 45 tablet 3     metroNIDAZOLE (METROCREAM) 0.75 % external cream Apply topically 2 times daily 45 g 11     nitroGLYcerin (NITROSTAT) 0.4 MG sublingual tablet Place 1 tablet (0.4 mg) under the tongue every 5 minutes as needed for chest pain 25 tablet 3      simvastatin (ZOCOR) 40 MG tablet Take 1 tablet (40 mg) by mouth At Bedtime 90 tablet 3     valACYclovir (VALTREX) 1000 mg tablet Take 1 pill twice a day for 3 days at the first sign of a cold core. 6 tablet 3        Allergies   Allergen Reactions     Macrodantin [Nitrofurantoin] Rash     Sulfa Drugs Rash     Tamsulosin Rash         Review of Systems:  -Constitutional: Otherwise feeling well today, in usual state of health.  -Skin: As above in HPI. No additional skin concerns.    Physical exam:  Vitals: There were no vitals taken for this visit.  GEN: This is a well developed, well-nourished male in no acute distress, in a pleasant mood.    SKIN: Full skin, which includes the head/face, both arms, chest, back, abdomen,both legs, buttocks, digits and/or nails, was examined.  -Villatoro skin type: I  -There is an erythematous macule with overyling adherent scale above the left eyebrow medially.  -Few scattered benign nevi  -Waxy stuck on papules  -No other lesions of concern on areas examined.     Impression/Plan:  1. Actinic keratosis  - Cryotherapy procedure note: After verbal consent and discussion of risks and benefits including but no limited to dyspigmentation/scar, blister, and pain, 1 lesion above left medial eyebrow was(were) treated with 1-2mm freeze border for 2 cycles with liquid nitrogen. Post cryotherapy instructions were provided.  - 6 month follow up     2.  Nevi- no atypia    3. Seborrheic keratoses- no atypia      CC Referred Self, MD  No address on file on close of this encounter.  Follow-up in 6 months, earlier for new or changing lesions.       Dr. Quarles staffed the patient.    Staff Involved:  Resident(Jony Rouse MD PGY3 family medicine resident)/Staff  I was present for the entire procedure. Anjelica Quarles MD  I, Anjelica Quarlse MD, saw this patient with the resident and agree with the resident s findings and plan of care as documented in the resident s note.

## 2021-01-06 NOTE — PATIENT INSTRUCTIONS
Cryotherapy    What is it?    Use of a very cold liquid, such as liquid nitrogen, to freeze and destroy abnormal skin cells that need to be removed    What should I expect?    Tenderness and redness    A small blister that might grow and fill with dark purple blood. There may be crusting.    More than one treatment may be needed if the lesions do not go away.    How do I care for the treated area?    Gently wash the area with your hands when bathing.    Use a thin layer of Vaseline to help with healing. You may use a Band-Aid.     The area should heal within 7-10 days and may leave behind a pink or lighter color.     Do not use an antibiotic or Neosporin ointment.     You may take acetaminophen (Tylenol) for pain.     Call your Doctor if you have:    Severe pain    Signs of infection (warmth, redness, cloudy yellow drainage, and or a bad smell)    Questions or concerns    Who should I call with questions?       [facility]: [phone]       [facility]: [phone]       For urgent needs outside of business hours call the [facility] at [phone]        and ask for the dermatology resident on call

## 2021-01-06 NOTE — NURSING NOTE
Dermatology Rooming Note    Jacinto Flannery's goals for this visit include:   Chief Complaint   Patient presents with     Skin Check     Sammy is here today for a skin check. He is concerned about a spot above the left eye brow     Jorge Ceron, CMA

## 2021-01-18 NOTE — PROGRESS NOTES
I am delighted to see Jacinto ASHLEY Prudencio for follow up of  Coronary artery disease.    As you know, the patient is a 80 year old  male with  a history of coronary artery disease status post bypass surgery and PCI, last angiogram was in 2016 when graft and stents were all patent.  I last saw him 3/7/2019 at which time he was very active, biked 45 minutes a day at least 5 times a week and he tries to keep his heart rate at about 110 bpm, able to do all of his daily activities without any chest discomfort, dyspnea, palpitations, or dizziness.    Since then, he continues to be active, although he admits slowing down a bit. Continues to walk/bike 45 minutes a day; no chest discomfort, dyspnea, palpitations, syncope.    Home -147    The following portions of the patient's history were reviewed and updated as appropriate: allergies, current medications, past family history, past medical history, past social history, past surgical history, and the problem list.      Past Medical History:  1.  Coronary artery disease, status post bypass surgery with a single SVG sequential graft to LAD and diagonal in 1990; PCI of RCA, left main, and left circumflex in November 2014.  Most recent angiogram 4/1/2016 when he presented with recurrent chest pain:  Left main: Stented from the ostium into proximal left circumflex stent widely patent  LAD: Small to medium in size  Left circumflex: Small and nondominant, widely patent stent present, diffuse 50% after the stent  RCA: Large dominant system, widely patent stent from the ostium to the mid segment  SVG sequential graft to the LAD and D1: Widely patent  2.  Benign prostatic hyperplasia status post TURP  3.  Hyperlipidemia  4.  Squamous cell carcinoma skin  5.  Hypertension  6.  GERD  7.  Cholecystectomy    Allergies:    Allergies   Allergen Reactions     Macrodantin [Nitrofurantoin] Rash     Sulfa Drugs Rash     Tamsulosin Rash       Medications:   Aspirin 81 mg daily  Metoprolol  succinate 12.5 mg daily  Simvastatin 40 mg nightly  Lunesta  Valtrex  Uroxatral    Family History:   Family History   Problem Relation Age of Onset     Cancer Father         Stomach     Heart Disease Mother      Heart Disease Maternal Grandmother      Skin Cancer No family hx of      Melanoma No family hx of        Psychosocial history:  reports that he quit smoking about 50 years ago. His smoking use included cigarettes. He started smoking about 55 years ago. He has a 0.50 pack-year smoking history. He has never used smokeless tobacco. He reports current alcohol use. He reports that he does not use drugs.    Review of systems: Cardiovascular - no chest pain, palpitations, dizziness, shortness of breath, dyspnea, orthopnea, PND.    In addition,   Constitutional: No change in weight, sleep or appetite.  Normal energy.  No fever or chills  Eyes: Negative for vision changes or eye problems  ENT: No problems with ears, nose or throat.  No difficulty swallowing.  Resp: No coughing, wheezing or shortness of breath  GI: No nausea, vomiting,  heartburn, abdominal pain, diarrhea, constipation or change in bowel habits  : No urinary frequency or dysuria, bladder or kidney problems  Musculoskeletal: No significant muscle or joint pains  Neurologic: No headaches, numbness, tingling, weakness, problems with balance or coordination  Psychiatric: No problems with anxiety, depression or mental health  Heme/immune/allergy: No history of bleeding or clotting problems or anemia.  No allergies or immune system problems  Integumentary: No rashes,worrisome lesions or skin problems      Physical examination  Vitals: 131/80, HR 91 bpm  BMI= 24    Constitutional: In general, the patient is a pleasant male in no apparent distress.    Eyes: PERRLA.  EOMI.  Sclerae white, not injected.  ENT/mouth: Normiocephalic and atraumatic.  Nares clear.  Pharynx without erythema or exudate.  Dentition intact.  No adenopathy.  No thyromegaly. Carotids  +2/2 bilaterally without bruits.  No jugular venous distension.   Card/Vasc: The PMI is in the 5th ICS in the midclavicular line. There is no heave. Regular rate and rhythm with frequent ectopy. Normal S1, S2. No murmur, rub, click, or gallop. Pulses are normal bilaterally throughout. No peripheral edema.  Respiratory: Clear to asculation.  No ronchi, wheezes, rales.  No dullness to percussion.   GI: Abdomen is soft, nontender, nondistended. No organomegaly. No AAA.  No bruits.   Integument: No significant bruises or rashes  Neurological: The neurological examination reveal a patient who was oriented to person, place, and time.    Psych: Normal  Heme/Lymph/Immun: no significant adenopathy      I have previously reviewed the following:  Echocardiogram 11/16/2014: Ejection fraction 55-60%, possible basal inferior hypokinesis, normal right ventricle, left atrial diameter 4.2 cm  EKG 3/7/2019: Normal sinus rhythm with runs of PACs, asymptomatic    I have personally and independently reviewed the following today:  EKG today 1/19/2021: Normal rhythm with normal intervals  Labs:  12/22/2020: cholesterol 156, HDL 50, LDL 79, , K 4.0, cr 0.89      Assessment :  1. CAD s/p CABG. He continues to be active without ischemic symptoms.  2. Hypertension. Will have him check home BP more often, with goal being < 120/80 on average. If consistently above this number, would increase metoprolol.  3. PACs. Asymptomatic. No specific treatment needed.      Plan:  Continue all current meds.   Discussed symptoms to monitor if recurrent ischemia.  Check home BP and log numbers, may need to increase metoprolol dose. Can follow up with PCP.      I spent a total of 20 minutes face to face with  Jacinto Flannery during today's office visit. I have spent an additional 10 minutes today reviewing records and documenting office note.     The patient is to return in 2 years or sooner if needed. The patient understood the treatment plan as outlined  above.  There were no barriers to learning.      Karen Akins MD

## 2021-01-19 ENCOUNTER — OFFICE VISIT (OUTPATIENT)
Dept: CARDIOLOGY | Facility: CLINIC | Age: 81
End: 2021-01-19
Attending: INTERNAL MEDICINE
Payer: MEDICARE

## 2021-01-19 VITALS
SYSTOLIC BLOOD PRESSURE: 131 MMHG | DIASTOLIC BLOOD PRESSURE: 80 MMHG | HEART RATE: 91 BPM | OXYGEN SATURATION: 97 % | HEIGHT: 70 IN | WEIGHT: 170 LBS | BODY MASS INDEX: 24.34 KG/M2

## 2021-01-19 DIAGNOSIS — I10 ESSENTIAL HYPERTENSION: Primary | ICD-10-CM

## 2021-01-19 DIAGNOSIS — I25.10 CORONARY ARTERY DISEASE INVOLVING NATIVE CORONARY ARTERY OF NATIVE HEART WITHOUT ANGINA PECTORIS: ICD-10-CM

## 2021-01-19 PROCEDURE — 99214 OFFICE O/P EST MOD 30 MIN: CPT | Performed by: INTERNAL MEDICINE

## 2021-01-19 PROCEDURE — G0463 HOSPITAL OUTPT CLINIC VISIT: HCPCS | Mod: 25

## 2021-01-19 PROCEDURE — 93005 ELECTROCARDIOGRAM TRACING: CPT

## 2021-01-19 ASSESSMENT — MIFFLIN-ST. JEOR: SCORE: 1497.36

## 2021-01-19 ASSESSMENT — PAIN SCALES - GENERAL: PAINLEVEL: NO PAIN (0)

## 2021-01-19 NOTE — LETTER
1/19/2021      RE: Jacinto Flannery  [address]  [facility] [address]       Dear Colleague,    Thank you for the opportunity to participate in the care of your patient, Jacinto Flannery, at the [facility]ART [facility] at [facility]. Please see a copy of my visit note below.    I am delighted to see Jacinto Flannery for follow up of  Coronary artery disease.    As you know, the patient is a 80 year old  male with  a history of coronary artery disease status post bypass surgery and PCI, last angiogram was in 2016 when graft and stents were all patent.  I last saw him 3/7/2019 at which time he was very active, biked 45 minutes a day at least 5 times a week and he tries to keep his heart rate at about 110 bpm, able to do all of his daily activities without any chest discomfort, dyspnea, palpitations, or dizziness.    Since then, he continues to be active, although he admits slowing down a bit. Continues to walk/bike 45 minutes a day; no chest discomfort, dyspnea, palpitations, syncope.    Home -[address]    The following portions of the patient's history were reviewed and updated as appropriate: allergies, current medications, past family history, past medical history, past social history, past surgical history, and the problem list.      Past Medical History:  1.  Coronary artery disease, status post bypass surgery with a single SVG sequential graft to LAD and diagonal in 1990; PCI of RCA, left main, and left circumflex in November 2014.  Most recent angiogram 4/1/2016 when he presented with recurrent chest pain:  Left main: Stented from the ostium into proximal left circumflex stent widely patent  LAD: Small to medium in size  Left circumflex: Small and nondominant, widely patent stent present, diffuse 50% after the stent  RCA: Large dominant system, widely patent stent from the ostium to the mid segment  SVG sequential graft to the LAD and D1: Widely patent  2.  Benign  prostatic hyperplasia status post TURP  3.  Hyperlipidemia  4.  Squamous cell carcinoma skin  5.  Hypertension  6.  GERD  7.  Cholecystectomy    Allergies:    Allergies   Allergen Reactions     Macrodantin [Nitrofurantoin] Rash     Sulfa Drugs Rash     Tamsulosin Rash       Medications:   Aspirin 81 mg daily  Metoprolol succinate 12.5 mg daily  Simvastatin 40 mg nightly  Lunesta  Valtrex  Uroxatral    Family History:   Family History   Problem Relation Age of Onset     Cancer Father         Stomach     Heart Disease Mother      Heart Disease Maternal Grandmother      Skin Cancer No family hx of      Melanoma No family hx of        Psychosocial history:  reports that he quit smoking about 50 years ago. His smoking use included cigarettes. He started smoking about 55 years ago. He has a 0.50 pack-year smoking history. He has never used smokeless tobacco. He reports current alcohol use. He reports that he does not use drugs.    Review of systems: Cardiovascular - no chest pain, palpitations, dizziness, shortness of breath, dyspnea, orthopnea, PND.    In addition,   Constitutional: No change in weight, sleep or appetite.  Normal energy.  No fever or chills  Eyes: Negative for vision changes or eye problems  ENT: No problems with ears, nose or throat.  No difficulty swallowing.  Resp: No coughing, wheezing or shortness of breath  GI: No nausea, vomiting,  heartburn, abdominal pain, diarrhea, constipation or change in bowel habits  : No urinary frequency or dysuria, bladder or kidney problems  Musculoskeletal: No significant muscle or joint pains  Neurologic: No headaches, numbness, tingling, weakness, problems with balance or coordination  Psychiatric: No problems with anxiety, depression or mental health  Heme/immune/allergy: No history of bleeding or clotting problems or anemia.  No allergies or immune system problems  Integumentary: No rashes,worrisome lesions or skin problems      Physical examination  Vitals:  131/80, HR 91 bpm  BMI= 24    Constitutional: In general, the patient is a pleasant male in no apparent distress.    Eyes: PERRLA.  EOMI.  Sclerae white, not injected.  ENT/mouth: Normiocephalic and atraumatic.  Nares clear.  Pharynx without erythema or exudate.  Dentition intact.  No adenopathy.  No thyromegaly. Carotids +2/2 bilaterally without bruits.  No jugular venous distension.   Card/Vasc: The PMI is in the 5th ICS in the midclavicular line. There is no heave. Regular rate and rhythm with frequent ectopy. Normal S1, S2. No murmur, rub, click, or gallop. Pulses are normal bilaterally throughout. No peripheral edema.  Respiratory: Clear to asculation.  No ronchi, wheezes, rales.  No dullness to percussion.   GI: Abdomen is soft, nontender, nondistended. No organomegaly. No AAA.  No bruits.   Integument: No significant bruises or rashes  Neurological: The neurological examination reveal a patient who was oriented to person, place, and time.    Psych: Normal  Heme/Lymph/Immun: no significant adenopathy      I have previously reviewed the following:  Echocardiogram 11/16/2014: Ejection fraction 55-60%, possible basal inferior hypokinesis, normal right ventricle, left atrial diameter 4.2 cm  EKG 3/7/2019: Normal sinus rhythm with runs of PACs, asymptomatic    I have personally and independently reviewed the following today:  EKG today 1/19/2021: Normal rhythm with normal intervals  Labs:  12/22/2020: cholesterol 156, HDL 50, LDL 79, , K 4.0, cr 0.89      Assessment :  1. CAD s/p CABG. He continues to be active without ischemic symptoms.  2. Hypertension. Will have him check home BP more often, with goal being < 120/80 on average. If consistently above this number, would increase metoprolol.  3. PACs. Asymptomatic. No specific treatment needed.      Plan:  Continue all current meds.   Discussed symptoms to monitor if recurrent ischemia.  Check home BP and log numbers, may need to increase metoprolol dose. Can  follow up with PCP.      I spent a total of 20 minutes face to face with  Jacinto Flannery during today's office visit. I have spent an additional 10 minutes today reviewing records and documenting office note.     The patient is to return in 2 years or sooner if needed. The patient understood the treatment plan as outlined above.  There were no barriers to learning.      Please do not hesitate to contact me if you have any questions/concerns.     Sincerely,     Karen Akins MD

## 2021-01-19 NOTE — PATIENT INSTRUCTIONS
You were seen in the Electrophysiology Clinic today by: Karen Akins MD    Plan:     Medication Changes: none    Labs/Tests Needed: none    Follow up visit: 2 years    Further Instructions: follow blood pressure at home, 3 to 4 times a week; goal is less than 120/80 on average      Your Care Team:  [facility] Cardiology   Telephone Number     Monse Booth RN [phone]     For scheduling appts or procedures:    Lani Stanley   [phone]   For the Device Clinic (Pacemakers, ICDs, Loop Recorders)    During business hours: [phone]  After business hours:   [phone]- select option 4 and ask for job code [phone].       Cardiovascular Clinic:   [address] [place] [address]. [place] [address]      As always, Thank you for trusting us with your health care needs!

## 2021-01-20 LAB — INTERPRETATION ECG - MUSE: NORMAL

## 2021-01-28 ENCOUNTER — IMMUNIZATION (OUTPATIENT)
Dept: NURSING | Facility: CLINIC | Age: 81
End: 2021-01-28
Payer: MEDICARE

## 2021-01-28 PROCEDURE — 91300 PR COVID VAC PFIZER DIL RECON 30 MCG/0.3 ML IM: CPT

## 2021-01-28 PROCEDURE — 0001A PR COVID VAC PFIZER DIL RECON 30 MCG/0.3 ML IM: CPT

## 2021-02-18 ENCOUNTER — IMMUNIZATION (OUTPATIENT)
Dept: NURSING | Facility: CLINIC | Age: 81
End: 2021-02-18
Attending: PHYSICIAN ASSISTANT
Payer: MEDICARE

## 2021-02-18 PROCEDURE — 91300 PR COVID VAC PFIZER DIL RECON 30 MCG/0.3 ML IM: CPT

## 2021-02-18 PROCEDURE — 0002A PR COVID VAC PFIZER DIL RECON 30 MCG/0.3 ML IM: CPT

## 2021-02-26 ENCOUNTER — MYC MEDICAL ADVICE (OUTPATIENT)
Dept: INTERNAL MEDICINE | Facility: CLINIC | Age: 81
End: 2021-02-26

## 2021-02-26 DIAGNOSIS — G50.0 TRIGEMINAL NEURALGIA: ICD-10-CM

## 2021-03-01 RX ORDER — CARBAMAZEPINE 200 MG/1
TABLET ORAL
Qty: 60 TABLET | Refills: 0 | OUTPATIENT
Start: 2021-03-01

## 2021-03-01 RX ORDER — CARBAMAZEPINE 200 MG/1
200 TABLET ORAL 2 TIMES DAILY
Qty: 180 TABLET | Refills: 3 | Status: SHIPPED | OUTPATIENT
Start: 2021-03-01 | End: 2021-10-15

## 2021-04-11 ENCOUNTER — HEALTH MAINTENANCE LETTER (OUTPATIENT)
Age: 81
End: 2021-04-11

## 2021-06-30 ENCOUNTER — OFFICE VISIT (OUTPATIENT)
Dept: DERMATOLOGY | Facility: CLINIC | Age: 81
End: 2021-06-30
Payer: MEDICARE

## 2021-06-30 DIAGNOSIS — D22.9 MULTIPLE NEVI: ICD-10-CM

## 2021-06-30 DIAGNOSIS — L57.0 AK (ACTINIC KERATOSIS): Primary | ICD-10-CM

## 2021-06-30 DIAGNOSIS — Z85.828 HISTORY OF SKIN CANCER: ICD-10-CM

## 2021-06-30 PROCEDURE — 17003 DESTRUCT PREMALG LES 2-14: CPT | Performed by: DERMATOLOGY

## 2021-06-30 PROCEDURE — 99213 OFFICE O/P EST LOW 20 MIN: CPT | Mod: 25 | Performed by: DERMATOLOGY

## 2021-06-30 PROCEDURE — 17000 DESTRUCT PREMALG LESION: CPT | Performed by: DERMATOLOGY

## 2021-06-30 ASSESSMENT — PAIN SCALES - GENERAL: PAINLEVEL: NO PAIN (0)

## 2021-06-30 NOTE — LETTER
6/30/2021       RE: Jacinto Flannery  [address]  [place] [address]     Dear Colleague,    Thank you for referring your patient, Jacinto Flannery, to the [facility] DERMATOLOGY CLINIC [place] at [facility]. Please see a copy of my visit note below.    [facility] Dermatology Note      Dermatology Problem List:  1.  NMSC              -SCC, left (dorsal) forearm, s/p excision 8/18/2015              -SCC, right dorsal hand, s/p Mohs 3/18/2015  2. Compound nevus with moderate dysplasia, mid upper back              -s/p biopsy 10/16/2015  3. Actinic keratosis                -s/p cryotherapy 07/20/2016, 03/06/19, 6/30/21              -efudex in November 2016 (used for 19 days), pt had a very robust response              -pt given hydrocortisone 2.5% cream BID PRN due to severe response and told to stop efudex  4. Unknown skin eruption penis. Resolved 7/5/17              -s/p triamcinolone cream initiated 3/17/2016              -s/p Nystatin initiated 3/17/2016              -s/p Mupirocin initiated 2/16/2015              -s/p Valtrex initiated 2/6/2015              -s/p biopsy showing hemorraghic scale crust and ulceration 2/3/2015  5. Drug exanthem, 2/2 to tamsulosin              -s/p biopsy showing interface dermatitis 12/29/2014              -s/p lidex initiated 1/8/2015              -s/p clobetasol initiated 12/29/2014-improved  6. Traumatic tattoo              -Central forehead, measured 1X1.5cm on 10/16/17  7. Rosacea              -Metronidazole cream    Encounter Date: Jun 30, 2021    CC:   Chief Complaint   Patient presents with     Derm Problem     Sammy, is here for a skin check , a spot on his left eat he wants looked at. no other concerns          History of Present Illness:  Mr. Jacinto Flannery is a 81 year old male who presents for 6 month skin check.    Last visit in 1/6/2021. He had cryotherapy for an AK on left medial eyebrow. Lesion has  healed well.     Today, he has one spot of concern. He noticed a new spot on the left ear around January. He feels it initially grew in size, but has since disappeared. No itching, bleeding, or burning. No associated changes in color.     No other spots of concern today. In otherwise normal state of health.      Past Medical History:   Patient Active Problem List   Diagnosis     CAD S/P percutaneous coronary angioplasty     CAD (coronary artery disease)     BPH (benign prostatic hyperplasia)     Hyperlipidemia with target LDL less than 70     S/P coronary artery stent placement     GERD (gastroesophageal reflux disease)     S/P TURP     Dermatitis     AK (actinic keratosis)     SCC (squamous cell carcinoma)     SK (seborrheic keratosis)     EIC (epidermal inclusion cyst)     History of squamous cell carcinoma     Seborrheic keratosis     Inflamed seborrheic keratosis     History of dysplastic nevus     Varicose veins     Medication reaction, initial encounter     HSV (herpes simplex virus) infection     Hypertension     Insomnia     Thrombocytopenia (H)     Benign prostatic hyperplasia with urinary obstruction     Hypertrophy of prostate without urinary obstruction and other lower urinary tract symptoms (LUTS)     Atherosclerosis of coronary artery     Recurrent coronary arteriosclerosis after percutaneous transluminal coronary angioplasty     History of atypical nevus     Hyperlipidemia     History of transurethral resection of prostate     Presence of coronary angioplasty implant and graft     Squamous cell carcinoma of skin     Varicose veins of other specified sites     Past Medical History:   Diagnosis Date     BPH (benign prostatic hyperplasia)      CAD (coronary artery disease)     MI 1989, CABG 1990, 11/2014 PCI to LM, LCx and RCA     Conductive hearing loss Unknown.     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     Skin disease      Squamous cell carcinoma      Past Surgical History:   Procedure Laterality Date     C CABG, VEIN, THREE  1990    SVG-LAD in [place]     CHOLECYSTECTOMY  1992    in [place]     ENDOSCOPY  04/26/2013    Done at [facility] in [place], [place]     GENITOURINARY SURGERY       HEART CATH STENT COR W/WO PTCA  11/12/2014    x 4 ANKUR's (two ostial to mid-RCA, one ostial LCx and mid-LM, one ostial LAD)     HERNIA REPAIR Right 2010    in [place]     LASER KTP GREEN LIGHT PHOTOSELECTIVE VAPORIZATION PROSTATE N/A 1/5/2015    Procedure: LASER KTP GREEN LIGHT PHOTOSELECTIVE VAPORIZATION PROSTATE;  Surgeon: Natalie Porter MD;  Location: [facility] OR     MOHS MICROGRAPHIC PROCEDURE         Social History:  Patient reports that he quit smoking about 51 years ago. His smoking use included cigarettes. He started smoking about 55 years ago. He has a 0.50 pack-year smoking history. He has never used smokeless tobacco. He reports current alcohol use. He reports that he does not use drugs.    Family History:  Family History   Problem Relation Age of Onset     Cancer Father         Stomach     Heart Disease Mother      Heart Disease Maternal Grandmother      Skin Cancer No family hx of      Melanoma No family hx of        Medications:  Current Outpatient Medications   Medication Sig Dispense Refill     alfuzosin ER (UROXATRAL) 10 MG 24 hr tablet Take 1 tablet (10 mg) by mouth daily 90 tablet 3     aspirin 81 MG tablet Take 81 mg by mouth daily       carBAMazepine (EPITOL) 200 MG tablet Take 1 tablet (200 mg) by mouth 2 times daily 180 tablet 3     Cyanocobalamin (B-12) 1000 MCG CAPS Take 1 capsule by mouth daily         eszopiclone (LUNESTA) 2 MG tablet Take 1 tablet (2 mg) by mouth nightly as needed for sleep 15 tablet 5     melatonin 1 MG CAPS        metoprolol succinate ER (TOPROL-XL) 25 MG 24 hr tablet TAKE 1/2 (ONE-HALF) TABLET BY MOUTH ONCE DAILY 45 tablet 3     nitroGLYcerin (NITROSTAT) 0.4 MG sublingual tablet Place 1 tablet (0.4 mg) under the tongue every 5 minutes as needed for chest pain 25 tablet 3     simvastatin (ZOCOR) 40 MG tablet Take 1 tablet (40 mg) by mouth At Bedtime 90 tablet 3     valACYclovir (VALTREX) 1000 mg tablet Take 1 pill twice a day for 3 days at the first sign of a cold core. 6 tablet 3     metroNIDAZOLE (METROCREAM) 0.75 % external cream Apply topically 2 times daily (Patient not taking: Reported on 1/19/2021) 45 g 11        Allergies   Allergen Reactions     Macrodantin [Nitrofurantoin] Rash     Sulfa Drugs Rash     Tamsulosin Rash         Review of Systems:  -Constitutional: Otherwise feeling well today, in usual state of health.  -Skin: As above in HPI. No additional skin concerns.    Physical exam:  Vitals: There were no vitals taken for this visit.  GEN: This is a well developed, well-nourished male in no acute distress, in a pleasant mood.    SKIN: Full skin, which includes the head/face, both arms, chest, back, abdomen,both legs, buttocks, digits and/or nails, was examined.  -Villatoro skin type: I  -There is are two erythematous macules with overyling adherent scale on the right and left pinna respectively.   -Few scattered benign nevi  -Waxy stuck on papules on the trunk  -1mm bright red papules on the trunk  -No other lesions of concern on areas examined.     Impression/Plan:  # Actinic keratosis, right ear x1, left ear x1  - Cryotherapy performed today (see procedure note(s) below).    # Benign lesions: Multiple benign nevi, seborrheic keratoses, cherry angiomas. Explained to patient benign nature of lesion.  - No treatment is necessary at this time unless the lesion changes or becomes  symptomatic.     # Hx of NMSC  - Regular skin checks; follow-up in 6 months    Procedures Performed:   - Cryotherapy procedure note, location(s): right x1, left x1 ears. After verbal consent and discussion of risks and benefits including, but not limited to, dyspigmentation/scar, blister, and pain, 2 lesion(s) was(were) treated with 1-2 mm freeze border for 1-2 cycles with liquid nitrogen. Post cryotherapy instructions were provided.  - Procedure(s) performed by faculty.       CC Referred Self, MD  No address on file on close of this encounter.    Follow-up in 6 months, earlier for new or changing lesions.       Dr. Quarles staffed the patient.    Staff and Medical Student Involved:  I saw and examined this patient in the presence of Dr. Quarles.    Dennis Aguillon, MS4  [facility]    I was present with the medical student who participated in the service and in the documentation of the note. I have verified the history and personally performed the physical exam and medical decision making. I agree with the assessment and plan of care as documented in the note.  Anjelica Quarles MD

## 2021-06-30 NOTE — PROGRESS NOTES
[facility] Dermatology Note      Dermatology Problem List:  1.  NMSC              -SCC, left (dorsal) forearm, s/p excision 8/18/2015              -SCC, right dorsal hand, s/p Mohs 3/18/2015  2. Compound nevus with moderate dysplasia, mid upper back              -s/p biopsy 10/16/2015  3. Actinic keratosis                -s/p cryotherapy 07/20/2016, 03/06/19, 6/30/21              -efudex in November 2016 (used for 19 days), pt had a very robust response              -pt given hydrocortisone 2.5% cream BID PRN due to severe response and told to stop efudex  4. Unknown skin eruption penis. Resolved 7/5/17              -s/p triamcinolone cream initiated 3/17/2016              -s/p Nystatin initiated 3/17/2016              -s/p Mupirocin initiated 2/16/2015              -s/p Valtrex initiated 2/6/2015              -s/p biopsy showing hemorraghic scale crust and ulceration 2/3/2015  5. Drug exanthem, 2/2 to tamsulosin              -s/p biopsy showing interface dermatitis 12/29/2014              -s/p lidex initiated 1/8/2015              -s/p clobetasol initiated 12/29/2014-improved  6. Traumatic tattoo              -Central forehead, measured 1X1.5cm on 10/16/17  7. Rosacea              -Metronidazole cream    Encounter Date: Jun 30, 2021    CC:   Chief Complaint   Patient presents with     Derm Problem     Sammy, is here for a skin check , a spot on his left eat he wants looked at. no other concerns          History of Present Illness:  Mr. Jacinto Flannery is a 81 year old male who presents for 6 month skin check.    Last visit in 1/6/2021. He had cryotherapy for an AK on left medial eyebrow. Lesion has healed well.     Today, he has one spot of concern. He noticed a new spot on the left ear around January. He feels it initially grew in size, but has since disappeared. No itching, bleeding, or burning. No associated changes in color.     No other spots of concern today. In otherwise normal state of  health.      Past Medical History:   Patient Active Problem List   Diagnosis     CAD S/P percutaneous coronary angioplasty     CAD (coronary artery disease)     BPH (benign prostatic hyperplasia)     Hyperlipidemia with target LDL less than 70     S/P coronary artery stent placement     GERD (gastroesophageal reflux disease)     S/P TURP     Dermatitis     AK (actinic keratosis)     SCC (squamous cell carcinoma)     SK (seborrheic keratosis)     EIC (epidermal inclusion cyst)     History of squamous cell carcinoma     Seborrheic keratosis     Inflamed seborrheic keratosis     History of dysplastic nevus     Varicose veins     Medication reaction, initial encounter     HSV (herpes simplex virus) infection     Hypertension     Insomnia     Thrombocytopenia (H)     Benign prostatic hyperplasia with urinary obstruction     Hypertrophy of prostate without urinary obstruction and other lower urinary tract symptoms (LUTS)     Atherosclerosis of coronary artery     Recurrent coronary arteriosclerosis after percutaneous transluminal coronary angioplasty     History of atypical nevus     Hyperlipidemia     History of transurethral resection of prostate     Presence of coronary angioplasty implant and graft     Squamous cell carcinoma of skin     Varicose veins of other specified sites     Past Medical History:   Diagnosis Date     BPH (benign prostatic hyperplasia)      CAD (coronary artery disease)     MI 1989, CABG 1990, 11/2014 PCI to LM, LCx and RCA     Conductive hearing loss Unknown.     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     Skin disease       Squamous cell carcinoma      Past Surgical History:   Procedure Laterality Date     C CABG, VEIN, THREE  1990    SVG-LAD in [place]     CHOLECYSTECTOMY  1992    in [place]     ENDOSCOPY  04/26/2013    Done at [facility] in [place], [place]     GENITOURINARY SURGERY       HEART CATH STENT COR W/WO PTCA  11/12/2014    x 4 ANKUR's (two ostial to mid-RCA, one ostial LCx and mid-LM, one ostial LAD)     HERNIA REPAIR Right 2010    in [place]     LASER KTP GREEN LIGHT PHOTOSELECTIVE VAPORIZATION PROSTATE N/A 1/5/2015    Procedure: LASER KTP GREEN LIGHT PHOTOSELECTIVE VAPORIZATION PROSTATE;  Surgeon: Natalie Porter MD;  Location: [facility] OR     MOHS MICROGRAPHIC PROCEDURE         Social History:  Patient reports that he quit smoking about 51 years ago. His smoking use included cigarettes. He started smoking about 55 years ago. He has a 0.50 pack-year smoking history. He has never used smokeless tobacco. He reports current alcohol use. He reports that he does not use drugs.    Family History:  Family History   Problem Relation Age of Onset     Cancer Father         Stomach     Heart Disease Mother      Heart Disease Maternal Grandmother      Skin Cancer No family hx of      Melanoma No family hx of        Medications:  Current Outpatient Medications   Medication Sig Dispense Refill     alfuzosin ER (UROXATRAL) 10 MG 24 hr tablet Take 1 tablet (10 mg) by mouth daily 90 tablet 3     aspirin 81 MG tablet Take 81 mg by mouth daily       carBAMazepine (EPITOL) 200 MG tablet Take 1 tablet (200 mg) by mouth 2 times daily 180 tablet 3     Cyanocobalamin (B-12) 1000 MCG CAPS Take 1 capsule by mouth daily        eszopiclone (LUNESTA) 2 MG tablet Take 1 tablet (2 mg) by mouth nightly as needed for sleep 15 tablet 5     melatonin 1 MG CAPS        metoprolol succinate ER (TOPROL-XL) 25 MG 24 hr tablet TAKE 1/2 (ONE-HALF) TABLET BY MOUTH ONCE DAILY 45 tablet 3     nitroGLYcerin (NITROSTAT) 0.4 MG sublingual tablet Place 1  tablet (0.4 mg) under the tongue every 5 minutes as needed for chest pain 25 tablet 3     simvastatin (ZOCOR) 40 MG tablet Take 1 tablet (40 mg) by mouth At Bedtime 90 tablet 3     valACYclovir (VALTREX) 1000 mg tablet Take 1 pill twice a day for 3 days at the first sign of a cold core. 6 tablet 3     metroNIDAZOLE (METROCREAM) 0.75 % external cream Apply topically 2 times daily (Patient not taking: Reported on 1/19/2021) 45 g 11        Allergies   Allergen Reactions     Macrodantin [Nitrofurantoin] Rash     Sulfa Drugs Rash     Tamsulosin Rash         Review of Systems:  -Constitutional: Otherwise feeling well today, in usual state of health.  -Skin: As above in HPI. No additional skin concerns.    Physical exam:  Vitals: There were no vitals taken for this visit.  GEN: This is a well developed, well-nourished male in no acute distress, in a pleasant mood.    SKIN: Full skin, which includes the head/face, both arms, chest, back, abdomen,both legs, buttocks, digits and/or nails, was examined.  -Villatoro skin type: I  -There is are two erythematous macules with overyling adherent scale on the right and left pinna respectively.   -Few scattered benign nevi  -Waxy stuck on papules on the trunk  -1mm bright red papules on the trunk  -No other lesions of concern on areas examined.     Impression/Plan:  # Actinic keratosis, right ear x1, left ear x1  - Cryotherapy performed today (see procedure note(s) below).    # Benign lesions: Multiple benign nevi, seborrheic keratoses, cherry angiomas. Explained to patient benign nature of lesion.  - No treatment is necessary at this time unless the lesion changes or becomes symptomatic.     # Hx of NMSC  - Regular skin checks; follow-up in 6 months    Procedures Performed:   - Cryotherapy procedure note, location(s): right x1, left x1 ears. After verbal consent and discussion of risks and benefits including, but not limited to, dyspigmentation/scar, blister, and pain, 2 lesion(s)  was(were) treated with 1-2 mm freeze border for 1-2 cycles with liquid nitrogen. Post cryotherapy instructions were provided.  - Procedure(s) performed by faculty.       CC Referred Self, MD  No address on file on close of this encounter.    Follow-up in 6 months, earlier for new or changing lesions.       Dr. Quarles staffed the patient.    Staff and Medical Student Involved:  I saw and examined this patient in the presence of Dr. Quarles.    Dennis Aguillon, MS4  [facility]    I was present with the medical student who participated in the service and in the documentation of the note. I have verified the history and personally performed the physical exam and medical decision making. I agree with the assessment and plan of care as documented in the note.  Anjelica Quarles MD

## 2021-06-30 NOTE — NURSING NOTE
Chief Complaint   Patient presents with     Derm Problem     Sammy, is here for a skin check , a spot on his left eat he wants looked at. no other concerns     Glenn Owusu EMT

## 2021-06-30 NOTE — PATIENT INSTRUCTIONS
Cryotherapy    What is it?    Use of a very cold liquid, such as liquid nitrogen, to freeze and destroy abnormal skin cells that need to be removed    What should I expect?    Tenderness and redness    A small blister that might grow and fill with dark purple blood. There may be crusting.    More than one treatment may be needed if the lesions do not go away.    How do I care for the treated area?    Gently wash the area with your hands when bathing.    Use a thin layer of Vaseline to help with healing. You may use a Band-Aid.     The area should heal within 7-10 days and may leave behind a pink or lighter color.     Do not use an antibiotic or Neosporin ointment.     You may take acetaminophen (Tylenol) for pain.     Call your Doctor if you have:    Severe pain    Signs of infection (warmth, redness, cloudy yellow drainage, and or a bad smell)    Questions or concerns    Who should I call with questions?       [facility]: [phone]       [facility]: [phone]       For urgent needs outside of business hours call the [facility] at [phone]        and ask for the dermatology resident on call

## 2021-09-26 ENCOUNTER — HEALTH MAINTENANCE LETTER (OUTPATIENT)
Age: 81
End: 2021-09-26

## 2021-09-28 ENCOUNTER — PRE VISIT (OUTPATIENT)
Dept: UROLOGY | Facility: CLINIC | Age: 81
End: 2021-09-28

## 2021-10-11 ENCOUNTER — LAB (OUTPATIENT)
Dept: LAB | Facility: CLINIC | Age: 81
End: 2021-10-11
Payer: MEDICARE

## 2021-10-11 DIAGNOSIS — E78.5 HYPERLIPIDEMIA WITH TARGET LDL LESS THAN 70: ICD-10-CM

## 2021-10-11 LAB
ALBUMIN SERPL-MCNC: 3.7 G/DL (ref 3.4–5)
ALP SERPL-CCNC: 60 U/L (ref 40–150)
ALT SERPL W P-5'-P-CCNC: 22 U/L (ref 0–70)
ANION GAP SERPL CALCULATED.3IONS-SCNC: 6 MMOL/L (ref 3–14)
AST SERPL W P-5'-P-CCNC: 22 U/L (ref 0–45)
BILIRUB SERPL-MCNC: 0.8 MG/DL (ref 0.2–1.3)
BUN SERPL-MCNC: 16 MG/DL (ref 7–30)
CALCIUM SERPL-MCNC: 8.8 MG/DL (ref 8.5–10.1)
CHLORIDE BLD-SCNC: 109 MMOL/L (ref 94–109)
CHOLEST SERPL-MCNC: 117 MG/DL
CO2 SERPL-SCNC: 28 MMOL/L (ref 20–32)
CREAT SERPL-MCNC: 0.94 MG/DL (ref 0.66–1.25)
FASTING STATUS PATIENT QL REPORTED: YES
GFR SERPL CREATININE-BSD FRML MDRD: 77 ML/MIN/1.73M2
GLUCOSE BLD-MCNC: 92 MG/DL (ref 70–99)
HDLC SERPL-MCNC: 51 MG/DL
LDLC SERPL CALC-MCNC: 49 MG/DL
NONHDLC SERPL-MCNC: 66 MG/DL
POTASSIUM BLD-SCNC: 4.2 MMOL/L (ref 3.4–5.3)
PROT SERPL-MCNC: 6.7 G/DL (ref 6.8–8.8)
SODIUM SERPL-SCNC: 143 MMOL/L (ref 133–144)
TRIGL SERPL-MCNC: 83 MG/DL

## 2021-10-11 PROCEDURE — 80053 COMPREHEN METABOLIC PANEL: CPT | Performed by: PATHOLOGY

## 2021-10-11 PROCEDURE — 36415 COLL VENOUS BLD VENIPUNCTURE: CPT | Performed by: PATHOLOGY

## 2021-10-11 PROCEDURE — 80061 LIPID PANEL: CPT | Performed by: PATHOLOGY

## 2021-10-15 ENCOUNTER — OFFICE VISIT (OUTPATIENT)
Dept: INTERNAL MEDICINE | Facility: CLINIC | Age: 81
End: 2021-10-15
Payer: MEDICARE

## 2021-10-15 VITALS
DIASTOLIC BLOOD PRESSURE: 56 MMHG | OXYGEN SATURATION: 97 % | WEIGHT: 162.3 LBS | HEIGHT: 70 IN | RESPIRATION RATE: 16 BRPM | BODY MASS INDEX: 23.24 KG/M2 | SYSTOLIC BLOOD PRESSURE: 108 MMHG | HEART RATE: 64 BPM

## 2021-10-15 DIAGNOSIS — I25.10 CORONARY ARTERY DISEASE INVOLVING NATIVE CORONARY ARTERY OF NATIVE HEART WITHOUT ANGINA PECTORIS: ICD-10-CM

## 2021-10-15 DIAGNOSIS — I25.812 CORONARY ARTERY DISEASE INVOLVING BYPASS GRAFT OF TRANSPLANTED HEART WITHOUT ANGINA PECTORIS: ICD-10-CM

## 2021-10-15 DIAGNOSIS — G50.0 TRIGEMINAL NEURALGIA: ICD-10-CM

## 2021-10-15 PROCEDURE — 99397 PER PM REEVAL EST PAT 65+ YR: CPT | Performed by: INTERNAL MEDICINE

## 2021-10-15 RX ORDER — SIMVASTATIN 40 MG
40 TABLET ORAL AT BEDTIME
Qty: 90 TABLET | Refills: 3 | Status: SHIPPED | OUTPATIENT
Start: 2021-10-15 | End: 2022-10-19

## 2021-10-15 RX ORDER — METOPROLOL SUCCINATE 25 MG/1
TABLET, EXTENDED RELEASE ORAL
Qty: 45 TABLET | Refills: 3 | Status: SHIPPED | OUTPATIENT
Start: 2021-10-15 | End: 2022-10-19

## 2021-10-15 RX ORDER — CARBAMAZEPINE 200 MG/1
200 TABLET ORAL 2 TIMES DAILY PRN
Qty: 30 TABLET | Refills: 3 | Status: SHIPPED | OUTPATIENT
Start: 2021-10-15 | End: 2022-10-19

## 2021-10-15 ASSESSMENT — MIFFLIN-ST. JEOR: SCORE: 1457.44

## 2021-10-15 ASSESSMENT — PAIN SCALES - GENERAL: PAINLEVEL: NO PAIN (0)

## 2021-10-15 NOTE — NURSING NOTE
Jacinto Flannery is a 81 year old male patient that presents today in clinic for the following:    Chief Complaint   Patient presents with     Physical     The patient's allergies and medications were reviewed as noted. A set of vitals were recorded as noted without incident. The patient does not have any other questions for the provider.    Todd Joseph, EMT at 10:37 AM on 10/15/2021

## 2021-10-20 DIAGNOSIS — Z98.61 STATUS POST PERCUTANEOUS TRANSLUMINAL CORONARY ANGIOPLASTY: ICD-10-CM

## 2021-10-21 NOTE — PROGRESS NOTES
"Jacinto is a very pleasant 81 year old male who was seen today for physical and medication refills.      He feels well in general and has no concerns; 10 point ROS negative.  He has lost some weight (about 6-8 pounds) but he states this happens every summer, when he is outdoors and much more active, and eating healthier foods.      No changes to past, family, or social history.  Medication regimen remains the same given his history of CAD and stent placement.    PHYSICAL EXAM:  /56 (BP Location: Right arm, Patient Position: Sitting, Cuff Size: Adult Regular)   Pulse 64   Resp 16   Ht 1.778 m (5' 10\")   Wt 73.6 kg (162 lb 4.8 oz)   SpO2 97%   BMI 23.29 kg/m    Constitutional: no distress, comfortable, pleasant   Eyes: anicteric, normal extra-ocular movements   Ears, Nose and Throat: tympanic membranes clear, neck supple with full range of motion, no thyromegaly.   Cardiovascular: regular rate and rhythm, normal S1 and S2, no murmurs, rubs or gallops, peripheral pulses full and symmetric   Respiratory: clear to auscultation, no wheezes or crackles, normal breath sounds   Gastrointestinal: positive bowel sounds, nontender, no hepatosplenomegaly, no masses   Musculoskeletal: knees full range of motion, no effusion  Skin: no concerning lesions, no jaundice   Neurological: normal gait, no tremor   Psychological: appropriate mood   Lymphatic: no cervical lymphadenopathy and no pedal edema        A/P  81 year old here for routine PE., doing well.      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

## 2021-10-24 RX ORDER — NITROGLYCERIN 0.4 MG/1
0.4 TABLET SUBLINGUAL EVERY 5 MIN PRN
Qty: 25 TABLET | Refills: 11 | Status: SHIPPED | OUTPATIENT
Start: 2021-10-24 | End: 2022-10-19

## 2021-10-26 ENCOUNTER — OFFICE VISIT (OUTPATIENT)
Dept: UROLOGY | Facility: CLINIC | Age: 81
End: 2021-10-26
Payer: MEDICARE

## 2021-10-26 VITALS
SYSTOLIC BLOOD PRESSURE: 143 MMHG | BODY MASS INDEX: 23.62 KG/M2 | WEIGHT: 165 LBS | DIASTOLIC BLOOD PRESSURE: 82 MMHG | HEIGHT: 70 IN | HEART RATE: 81 BPM

## 2021-10-26 DIAGNOSIS — N40.1 BENIGN NON-NODULAR PROSTATIC HYPERPLASIA WITH LOWER URINARY TRACT SYMPTOMS: Primary | ICD-10-CM

## 2021-10-26 PROCEDURE — 99212 OFFICE O/P EST SF 10 MIN: CPT | Performed by: PHYSICIAN ASSISTANT

## 2021-10-26 RX ORDER — ALFUZOSIN HYDROCHLORIDE 10 MG/1
1 TABLET, EXTENDED RELEASE ORAL DAILY
Qty: 90 TABLET | Refills: 3 | Status: SHIPPED | OUTPATIENT
Start: 2021-10-26 | End: 2022-10-25

## 2021-10-26 ASSESSMENT — PAIN SCALES - GENERAL: PAINLEVEL: NO PAIN (0)

## 2021-10-26 ASSESSMENT — MIFFLIN-ST. JEOR: SCORE: 1469.69

## 2021-10-26 NOTE — PATIENT INSTRUCTIONS
UROLOGY CLINIC VISIT PATIENT INSTRUCTIONS    Continue taking alfuzosin 10 mg once daily.    Follow up in 1 year, sooner with any problems.     If you have any issues, questions or concerns in the meantime, do not hesitate to contact us at [phone] or via [employer].     It was a pleasure meeting with you today.  Thank you for allowing me and my team the privilege of caring for you today.  YOU are the reason we are here, and I truly hope we provided you with the excellent service you deserve.  Please let us know if there is anything else we can do for you so that we can be sure you are leaving completely satisfied with your care experience.

## 2021-10-26 NOTE — PROGRESS NOTES
UROLOGY OFFICE VISIT FOLLOW-UP NOTE           Chief Complaint:   Annual follow up          Assessment/Plan   81 year old male with history of urinary retention s/p PVP in 2014, doing well with minimal symptoms. PVR in 11/2020 was low at 10 mL. He is not having any bothersome LUTS, UTIs, or urinary incontinence. Recent kidney function within normal limits. Discussed that based on the above, no need for additional urologic intervention at this time. He agrees.  -Continue alfuzosin 10 mg once daily. Renewed.   -Follow up in 1 year, sooner with any issues.    Gill Melendez PA-C  Department of Urology         Interval Update   Jacinto Flannery is a very pleasant 82 yo M with hx of urinary retention.  He underwent PVP with Dr. Porter in 2014.  Last virtual visit on 9/22/2020 with Dr. Pritchett. History from this date as follows:    He has had some residual symptoms and has managed these conservatively over the years.  He is not bothered currently. He has had some elevated PVRs in the past.  At one point underwent VUDS but there was difficulty getting the cather in.  One year ago had a PVR of 30 and a cystoscopy around that time showed minimal regrowth with an open bladder neck.    Today notes: He is very pleased with the way he voids.  Denies slow stream, hesitancy, UTI or hematuria.  He takes alfuzosin.    On that date, he was recommended to continue alfuzosin and follow up for PVR as needed.    TODAY: He feels to be voiding well. Has nocturia 1 time per night and no daytime symptoms. He double voids about once per day to ensure complete bladder emptying. Occasionally measures his urine output when he double/triple voids with average voided volumes as follows: first void: 300 mL, second void: 100 mL, third void: 50 mL. He denies any urinary incontinence or UTIs. He had a PVR in the nurse clinic on 11/2/2020 which was very low at 10 mL. Recent kidney function a few weeks ago was within normal limits.      Physical  "Exam:    BP (!) 143/82   Pulse 81   Ht 1.778 m (5' 10\")   Wt 74.8 kg (165 lb)   BMI 23.68 kg/m    GENERAL: Healthy, alert and no distress  EYES: Eyes grossly normal to inspection.  No discharge or erythema, or obvious scleral/conjunctival abnormalities.  RESP: No audible wheeze, cough, or visible cyanosis.  No visible retractions or increased work of breathing.    SKIN: Visible skin clear. No significant rash, abnormal pigmentation or lesions.  NEURO: Cranial nerves grossly intact.  Mentation and speech appropriate for age.  PSYCH: Mentation appears normal, affect normal/bright, judgement and insight intact, normal speech and appearance well-groomed.      Labs and Pathology:    I personally reviewed all applicable laboratory data and went over findings with patient  Significant for:    Creatinine   Date Value Ref Range Status   10/11/2021 0.94 0.66 - 1.25 mg/dL Final   12/22/2020 0.89 0.66 - 1.25 mg/dL Final            Past Medical History:     Past Medical History:   Diagnosis Date     BPH (benign prostatic hyperplasia)      CAD (coronary artery disease)     MI 1989, CABG 1990, 11/2014 PCI to LM, LCx and RCA     Conductive hearing loss Unknown.     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     Skin disease      Squamous cell carcinoma             Past Surgical History:     Past Surgical History:   Procedure Laterality Date     C CABG, VEIN, THREE  1990    SVG-LAD in [place]     CHOLECYSTECTOMY  1992    in [place]     ENDOSCOPY  04/26/2013    Done at [facility] in [place], [place]     GENITOURINARY SURGERY       HEART CATH STENT COR W/WO " PTCA  11/12/2014    x 4 ANKUR's (two ostial to mid-RCA, one ostial LCx and mid-LM, one ostial LAD)     HERNIA REPAIR Right     in [place]     LASER KTP GREEN LIGHT PHOTOSELECTIVE VAPORIZATION PROSTATE N/A 2015    Procedure: LASER KTP GREEN LIGHT PHOTOSELECTIVE VAPORIZATION PROSTATE;  Surgeon: Natalie Porter MD;  Location: [facility] OR     MOHS MICROGRAPHIC PROCEDURE              Medications     Current Outpatient Medications   Medication     alfuzosin ER (UROXATRAL) 10 MG 24 hr tablet     aspirin 81 MG tablet     carBAMazepine (EPITOL) 200 MG tablet     Cyanocobalamin (B-12) 1000 MCG CAPS     eszopiclone (LUNESTA) 2 MG tablet     melatonin 1 MG CAPS     metoprolol succinate ER (TOPROL-XL) 25 MG 24 hr tablet     metroNIDAZOLE (METROCREAM) 0.75 % external cream     nitroGLYcerin (NITROSTAT) 0.4 MG sublingual tablet     simvastatin (ZOCOR) 40 MG tablet     valACYclovir (VALTREX) 1000 mg tablet     Current Facility-Administered Medications   Medication     lidocaine 1% with EPINEPHrine 1:100,000 injection 0.5 mL            Family History:     Family History   Problem Relation Age of Onset     Cancer Father         Stomach     Heart Disease Mother      Heart Disease Maternal Grandmother      Skin Cancer No family hx of      Melanoma No family hx of             Social History:     Social History     Socioeconomic History     Marital status:      Spouse name: Not on file     Number of children: Not on file     Years of education: Not on file     Highest education level: Not on file   Occupational History     Not on file   Tobacco Use     Smoking status: Former Smoker     Packs/day: 0.50     Years: 1.00     Pack years: 0.50     Types: Cigarettes     Start date: 1965     Quit date: 1970     Years since quittin.5     Smokeless tobacco: Never Used   Substance and Sexual Activity     Alcohol use: Yes     Comment: rarely - a beer a month     Drug use: No     Sexual activity: Yes     Partners:  Female     Birth control/protection: Male Surgical   Other Topics Concern     Parent/sibling w/ CABG, MI or angioplasty before 65F 55M? Not Asked   Social History Narrative     Not on file     Social Determinants of Health     Financial Resource Strain:      Difficulty of Paying Living Expenses:    Food Insecurity:      Worried About Running Out of Food in the Last Year:      Ran Out of Food in the Last Year:    Transportation Needs:      Lack of Transportation (Medical):      Lack of Transportation (Non-Medical):    Physical Activity:      Days of Exercise per Week:      Minutes of Exercise per Session:    Stress:      Feeling of Stress :    Social Connections:      Frequency of Communication with Friends and Family:      Frequency of Social Gatherings with Friends and Family:      Attends Scientologist Services:      Active Member of Clubs or Organizations:      Attends Club or Organization Meetings:      Marital Status:    Intimate Partner Violence:      Fear of Current or Ex-Partner:      Emotionally Abused:      Physically Abused:      Sexually Abused:             Allergies:   Macrodantin [nitrofurantoin], Sulfa drugs, and Tamsulosin         Review of Systems:  From intake questionnaire   Negative 14 system review except as noted on HPI, nurse's note.      18 minutes spent on the date of the encounter doing chart review, review of test results, interpretation of tests, patient visit and documentation

## 2021-10-26 NOTE — LETTER
10/26/2021       RE: Jacinto Flannery  [address]  [place] [address]     Dear Colleague,    Thank you for referring your patient, Jacinto Flannery, to the [facility] UROLOGY CLINIC [place] [facility]. Please see a copy of my visit note below.        UROLOGY OFFICE VISIT FOLLOW-UP NOTE           Chief Complaint:   Annual follow up          Assessment/Plan   81 year old male with history of urinary retention s/p PVP in 2014, doing well with minimal symptoms. PVR in 11/2020 was low at 10 mL. He is not having any bothersome LUTS, UTIs, or urinary incontinence. Recent kidney function within normal limits. Discussed that based on the above, no need for additional urologic intervention at this time. He agrees.  -Continue alfuzosin 10 mg once daily. Renewed.   -Follow up in 1 year, sooner with any issues.    Gill Melendez PA-C  Department of Urology         Interval Update   Jacinto Flannery is a very pleasant 82 yo M with hx of urinary retention.  He underwent PVP with Dr. Porter in 2014.  Last virtual visit on 9/22/2020 with Dr. Pritchett. History from this date as follows:    He has had some residual symptoms and has managed these conservatively over the years.  He is not bothered currently. He has had some elevated PVRs in the past.  At one point underwent VUDS but there was difficulty getting the cather in.  One year ago had a PVR of 30 and a cystoscopy around that time showed minimal regrowth with an open bladder neck.    Today notes: He is very pleased with the way he voids.  Denies slow stream, hesitancy, UTI or hematuria.  He takes alfuzosin.    On that date, he was recommended to continue alfuzosin and follow up for PVR as needed.    TODAY: He feels to be voiding well. Has nocturia 1 time per night and no daytime symptoms. He double voids about once per day to ensure complete bladder emptying. Occasionally measures his urine output when he double/triple voids  "with average voided volumes as follows: first void: 300 mL, second void: 100 mL, third void: 50 mL. He denies any urinary incontinence or UTIs. He had a PVR in the nurse clinic on 11/2/2020 which was very low at 10 mL. Recent kidney function a few weeks ago was within normal limits.      Physical Exam:    BP (!) 143/82   Pulse 81   Ht 1.778 m (5' 10\")   Wt 74.8 kg (165 lb)   BMI 23.68 kg/m    GENERAL: Healthy, alert and no distress  EYES: Eyes grossly normal to inspection.  No discharge or erythema, or obvious scleral/conjunctival abnormalities.  RESP: No audible wheeze, cough, or visible cyanosis.  No visible retractions or increased work of breathing.    SKIN: Visible skin clear. No significant rash, abnormal pigmentation or lesions.  NEURO: Cranial nerves grossly intact.  Mentation and speech appropriate for age.  PSYCH: Mentation appears normal, affect normal/bright, judgement and insight intact, normal speech and appearance well-groomed.      Labs and Pathology:    I personally reviewed all applicable laboratory data and went over findings with patient  Significant for:    Creatinine   Date Value Ref Range Status   10/11/2021 0.94 0.66 - 1.25 mg/dL Final   12/22/2020 0.89 0.66 - 1.25 mg/dL Final            Past Medical History:     Past Medical History:   Diagnosis Date     BPH (benign prostatic hyperplasia)      CAD (coronary artery disease)     MI 1989, CABG 1990, 11/2014 PCI to LM, LCx and RCA     Conductive hearing loss Unknown.     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     Skin disease      Squamous cell carcinoma             " Past Surgical History:     Past Surgical History:   Procedure Laterality Date     C CABG, VEIN, THREE  1990    SVG-LAD in [place]     CHOLECYSTECTOMY  1992    in [place]     ENDOSCOPY  04/26/2013    Done at [facility] in [place], [place]     GENITOURINARY SURGERY       HEART CATH STENT COR W/WO PTCA  11/12/2014    x 4 ANKUR's (two ostial to mid-RCA, one ostial LCx and mid-LM, one ostial LAD)     HERNIA REPAIR Right 2010    in [place]     LASER KTP GREEN LIGHT PHOTOSELECTIVE VAPORIZATION PROSTATE N/A 1/5/2015    Procedure: LASER KTP GREEN LIGHT PHOTOSELECTIVE VAPORIZATION PROSTATE;  Surgeon: Natalie Porter MD;  Location: [facility] OR     MOHS MICROGRAPHIC PROCEDURE              Medications     Current Outpatient Medications   Medication     alfuzosin ER (UROXATRAL) 10 MG 24 hr tablet     aspirin 81 MG tablet     carBAMazepine (EPITOL) 200 MG tablet     Cyanocobalamin (B-12) 1000 MCG CAPS     eszopiclone (LUNESTA) 2 MG tablet     melatonin 1 MG CAPS     metoprolol succinate ER (TOPROL-XL) 25 MG 24 hr tablet     metroNIDAZOLE (METROCREAM) 0.75 % external cream     nitroGLYcerin (NITROSTAT) 0.4 MG sublingual tablet     simvastatin (ZOCOR) 40 MG tablet     valACYclovir (VALTREX) 1000 mg tablet     Current Facility-Administered Medications   Medication     lidocaine 1% with EPINEPHrine 1:100,000 injection 0.5 mL            Family History:     Family History   Problem Relation Age of Onset     Cancer Father         Stomach     Heart Disease Mother      Heart Disease Maternal Grandmother      Skin Cancer No family hx of      Melanoma No family hx of             Social History:     Social History     Socioeconomic History     Marital status:      Spouse name: Not on file     Number of children: Not on file     Years of education: Not on file     Highest education level: Not on file   Occupational History     Not on file   Tobacco Use     Smoking status: Former Smoker     Packs/day: 0.50     Years: 1.00     Pack  years: 0.50     Types: Cigarettes     Start date: 1965     Quit date: 1970     Years since quittin.5     Smokeless tobacco: Never Used   Substance and Sexual Activity     Alcohol use: Yes     Comment: rarely - a beer a month     Drug use: No     Sexual activity: Yes     Partners: Female     Birth control/protection: Male Surgical   Other Topics Concern     Parent/sibling w/ CABG, MI or angioplasty before 65F 55M? Not Asked   Social History Narrative     Not on file     Social Determinants of Health     Financial Resource Strain:      Difficulty of Paying Living Expenses:    Food Insecurity:      Worried About Running Out of Food in the Last Year:      Ran Out of Food in the Last Year:    Transportation Needs:      Lack of Transportation (Medical):      Lack of Transportation (Non-Medical):    Physical Activity:      Days of Exercise per Week:      Minutes of Exercise per Session:    Stress:      Feeling of Stress :    Social Connections:      Frequency of Communication with Friends and Family:      Frequency of Social Gatherings with Friends and Family:      Attends Faith Services:      Active Member of Clubs or Organizations:      Attends Club or Organization Meetings:      Marital Status:    Intimate Partner Violence:      Fear of Current or Ex-Partner:      Emotionally Abused:      Physically Abused:      Sexually Abused:             Allergies:   Macrodantin [nitrofurantoin], Sulfa drugs, and Tamsulosin         Review of Systems:  From intake questionnaire   Negative 14 system review except as noted on HPI, nurse's note.      18 minutes spent on the date of the encounter doing chart review, review of test results, interpretation of tests, patient visit and documentation

## 2022-01-14 DIAGNOSIS — G47.00 PERSISTENT INSOMNIA: ICD-10-CM

## 2022-01-18 NOTE — TELEPHONE ENCOUNTER
Eszopiclone 2 MG Oral Tablet   Last Written Prescription Date:  12/28/20  Last Fill Quantity: 15 tablets,   # refills: 5  Last Office Visit : 10/15/21  Future Office visit:  None/ one year    Routing refill request to provider for review/approval because:  Controlled substance

## 2022-01-19 RX ORDER — ESZOPICLONE 2 MG/1
TABLET, FILM COATED ORAL
Qty: 15 TABLET | Refills: 5 | Status: SHIPPED | OUTPATIENT
Start: 2022-01-19 | End: 2022-10-19

## 2022-03-21 ENCOUNTER — OFFICE VISIT (OUTPATIENT)
Dept: DERMATOLOGY | Facility: CLINIC | Age: 82
End: 2022-03-21
Payer: MEDICARE

## 2022-03-21 DIAGNOSIS — L57.0 AK (ACTINIC KERATOSIS): Primary | ICD-10-CM

## 2022-03-21 DIAGNOSIS — Z85.828 HISTORY OF SKIN CANCER: ICD-10-CM

## 2022-03-21 PROCEDURE — 17000 DESTRUCT PREMALG LESION: CPT | Mod: GC | Performed by: DERMATOLOGY

## 2022-03-21 PROCEDURE — 99213 OFFICE O/P EST LOW 20 MIN: CPT | Mod: 25 | Performed by: DERMATOLOGY

## 2022-03-21 ASSESSMENT — PAIN SCALES - GENERAL: PAINLEVEL: NO PAIN (0)

## 2022-03-21 NOTE — PROGRESS NOTES
[facility] Dermatology Note      Dermatology Problem List:  1.  NMSC              -SCC, left (dorsal) forearm, s/p excision 8/18/2015              -SCC, right dorsal hand, s/p Mohs 3/18/2015  2. Compound nevus with moderate dysplasia, mid upper back              -s/p biopsy 10/16/2015  3. Actinic keratosis                -s/p cryotherapy 07/20/2016, 03/06/19              -efudex in November 2016 (used for 19 days), pt had a very robust response              -pt given hydrocortisone 2.5% cream BID PRN due to severe response and told to stop efudex  4. Unknown skin eruption penis. Resolved 7/5/17              -s/p triamcinolone cream initiated 3/17/2016              -s/p Nystatin initiated 3/17/2016              -s/p Mupirocin initiated 2/16/2015              -s/p Valtrex initiated 2/6/2015              -s/p biopsy showing hemorraghic scale crust and ulceration 2/3/2015  5. Drug exanthem, 2/2 to tamsulosin              -s/p biopsy showing interface dermatitis 12/29/2014              -s/p lidex initiated 1/8/2015              -s/p clobetasol initiated 12/29/2014-improved  6. Traumatic tattoo              -Central forehead, measured 1X1.5cm on 10/16/17  7. Rosacea              -Metronidazole cream    Encounter Date: Mar 21, 2022    CC:   Chief Complaint   Patient presents with     Skin Check     pt states he is here for a full body skin check.         History of Present Illness:  Mr. Jacinto Flannery is a 81 year old male who presents for 6 month skin check, concern for new dry spot above his left eyebrow. The patient was last seen 6/2021 when he had cryotherapy for AKs. Patient has no specific concerns today. Recently went to [employer] and likes to golf and bike, [place].    Denies other bleeding, tender or non healing lesions.    Past Medical History:   Patient Active Problem List   Diagnosis     CAD S/P percutaneous coronary angioplasty     CAD (coronary artery disease)      BPH (benign prostatic hyperplasia)     Hyperlipidemia with target LDL less than 70     S/P coronary artery stent placement     GERD (gastroesophageal reflux disease)     S/P TURP     Dermatitis     AK (actinic keratosis)     SCC (squamous cell carcinoma)     SK (seborrheic keratosis)     EIC (epidermal inclusion cyst)     History of squamous cell carcinoma     Seborrheic keratosis     Inflamed seborrheic keratosis     History of dysplastic nevus     Varicose veins     Medication reaction, initial encounter     HSV (herpes simplex virus) infection     Hypertension     Insomnia     Thrombocytopenia (H)     Benign prostatic hyperplasia with urinary obstruction     Hypertrophy of prostate without urinary obstruction and other lower urinary tract symptoms (LUTS)     Atherosclerosis of coronary artery     Recurrent coronary arteriosclerosis after percutaneous transluminal coronary angioplasty     History of atypical nevus     Hyperlipidemia     History of transurethral resection of prostate     Presence of coronary angioplasty implant and graft     Squamous cell carcinoma of skin     Varicose veins of other specified sites     Past Medical History:   Diagnosis Date     BPH (benign prostatic hyperplasia)      CAD (coronary artery disease)     MI 1989, CABG 1990, 11/2014 PCI to LM, LCx and RCA     Conductive hearing loss Unknown.     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     Skin disease      Squamous cell carcinoma      Past Surgical History:   Procedure Laterality Date     CHOLECYSTECTOMY  1992    in [place]     ENDOSCOPY  04/26/2013    Done at  [facility] in [place], [place]     GENITOURINARY SURGERY       HEART CATH STENT COR W/WO PTCA  11/12/2014    x 4 ANKUR's (two ostial to mid-RCA, one ostial LCx and mid-LM, one ostial LAD)     HERNIA REPAIR Right 2010    in [place]     LASER KTP GREEN LIGHT PHOTOSELECTIVE VAPORIZATION PROSTATE N/A 1/5/2015    Procedure: LASER KTP GREEN LIGHT PHOTOSELECTIVE VAPORIZATION PROSTATE;  Surgeon: Natalie Porter MD;  Location: [facility] OR     MOHS MICROGRAPHIC PROCEDURE       [facility] CABG, VEIN, THREE  1990    SVG-LAD in [place]       Social History:  Patient reports that he quit smoking about 51 years ago. His smoking use included cigarettes. He started smoking about 56 years ago. He has a 0.50 pack-year smoking history. He has never used smokeless tobacco. He reports current alcohol use. He reports that he does not use drugs.    Family History:  Family History   Problem Relation Age of Onset     Cancer Father         Stomach     Heart Disease Mother      Heart Disease Maternal Grandmother      Skin Cancer No family hx of      Melanoma No family hx of        Medications:  Current Outpatient Medications   Medication Sig Dispense Refill     alfuzosin ER (UROXATRAL) 10 MG 24 hr tablet Take 1 tablet (10 mg) by mouth daily 90 tablet 3     aspirin 81 MG tablet Take 81 mg by mouth daily       carBAMazepine (EPITOL) 200 MG tablet Take 1 tablet (200 mg) by mouth 2 times daily as needed (for trigeminal neuralgia) 30 tablet 3     Cyanocobalamin (B-12) 1000 MCG CAPS Take 1 capsule by mouth daily        eszopiclone (LUNESTA) 2 MG tablet TAKE 1 TABLET BY MOUTH NIGHTLY AS NEEDED FOR SLEEP 15 tablet 5     melatonin 1 MG CAPS        metoprolol succinate ER (TOPROL-XL) 25 MG 24 hr tablet TAKE 1/2 (ONE-HALF) TABLET BY MOUTH ONCE DAILY 45 tablet 3     metroNIDAZOLE (METROCREAM) 0.75 % external cream Apply topically 2 times daily 45 g 11     nitroGLYcerin (NITROSTAT) 0.4 MG sublingual tablet Place 1 tablet (0.4 mg) under the tongue every 5  minutes as needed for chest pain *DO NOT EXCEED A TOTAL OF 3 DOSES IN 15 MINUTES. 25 tablet 11     simvastatin (ZOCOR) 40 MG tablet Take 1 tablet (40 mg) by mouth At Bedtime 90 tablet 3     valACYclovir (VALTREX) 1000 mg tablet Take 1 pill twice a day for 3 days at the first sign of a cold core. 6 tablet 3        Allergies   Allergen Reactions     Macrodantin [Nitrofurantoin] Rash     Sulfa Drugs Rash     Tamsulosin Rash         Review of Systems:  -Constitutional: Otherwise feeling well today, in usual state of health.  -Skin: As above in HPI. No additional skin concerns.    Physical exam:  Vitals: There were no vitals taken for this visit.  GEN: This is a well developed, well-nourished male in no acute distress, in a pleasant mood.    SKIN: Full skin, which includes the head/face, both arms, chest, back, abdomen,both legs, buttocks, digits and/or nails, was examined.  -Villatoro skin type: I  -There is on gritty pink papule on left temple  -Waxy stuck on papules and trunk and extremities, few.   - stuck on waxy brown papule on right tragus of ear  - light brown papule, regular on right antihelix  - scattered telangiectasias with background of erythema  -No other lesions of concern on areas examined.     Impression/Plan:  1. Actinic keratosis  - Cryotherapy procedure note: After verbal consent and discussion of risks and benefits including but no limited to dyspigmentation/scar, blister, and pain, 1 lesion above left temple was(were) treated with 1-2mm freeze border for 2 cycles with liquid nitrogen. Post cryotherapy instructions were provided.    2.  SK's, lentigines,  Nevi  - Counseled on benign etiology    3. Rosacea  - Continue metronidazole cream bid prn, face    4. Hx NMSC  - NERD  - Counseled on sun protection, handouts provided      CC Referred Self, MD  No address on file on close of this encounter.  Follow-up in 6 months, earlier for new or changing lesions.     Christi Courtney PGY4  Dermatology  Resident  pager      Dr. Quarles staffed the patient.  I was present for the entire procedure. Anjelica Quarles MD  I, Anjelica Quarles MD, saw this patient with the resident and agree with the resident s findings and plan of care as documented in the resident s note.

## 2022-03-21 NOTE — PATIENT INSTRUCTIONS
Recommended use of a broad spectrum sunscreen of at least SPF 30 on all sun exposed sites daily.  Apply 20 minutes prior to exposure and repeat application every two hours or after sweating or swimming.  Avoid any intentional indoor or outdoor tanning.      Sun protective clothing and Resources     Lands End (www.landsend.com)  Athleta (www.athleta.[email or web address])  Huddlebuy (www.WeStore.[employer])  Carve Designs ([employer]) - affordable  Skinz ([employer] GunBoxskinz.com)    Long sleeve - Bobby Cool DRI UPF 50 or [place] PFG UPF 50  Hoodie - [place] PFG UPF 50  Swimshirt/Rash Guard - Anne UPF 50 (on [employer])  Neck - Outdoor Research Ubertubes (www.outdoorresVisiQuate.com)          Cryotherapy    What is it?    Use of a very cold liquid, such as liquid nitrogen, to freeze and destroy abnormal skin cells that need to be removed    What should I expect?    Tenderness and redness    A small blister that might grow and fill with dark purple blood. There may be crusting.    More than one treatment may be needed if the lesions do not go away.    How do I care for the treated area?    Gently wash the area with your hands when bathing.    Use a thin layer of Vaseline to help with healing. You may use a Band-Aid.     The area should heal within 7-10 days and may leave behind a pink or lighter color.     Do not use an antibiotic or Neosporin ointment.     You may take acetaminophen (Tylenol) for pain.     Call your doctor if you have:    Severe pain    Signs of infection (warmth, redness, cloudy yellow drainage, and or a bad smell)    Questions or concerns    Who should I call with questions?       [facility]: [phone]       [facility]: [phone]       For urgent needs outside of business hours call the [facility] at [phone] and ask for the dermatology resident on call

## 2022-03-21 NOTE — LETTER
3/21/2022       RE: Jacinto Flannery  [address]  [place] [address]     Dear Colleague,    Thank you for referring your patient, Jacinto Flannery, to the [facility] DERMATOLOGY CL[facility] [place] at [facility]. Please see a copy of my visit note below.    [facility] Dermatology Note      Dermatology Problem List:  1.  NMSC              -SCC, left (dorsal) forearm, s/p excision 8/18/2015              -SCC, right dorsal hand, s/p Mohs 3/18/2015  2. Compound nevus with moderate dysplasia, mid upper back              -s/p biopsy 10/16/2015  3. Actinic keratosis                -s/p cryotherapy 07/20/2016, 03/06/19              -efudex in November 2016 (used for 19 days), pt had a very robust response              -pt given hydrocortisone 2.5% cream BID PRN due to severe response and told to stop efudex  4. Unknown skin eruption penis. Resolved 7/5/17              -s/p triamcinolone cream initiated 3/17/2016              -s/p Nystatin initiated 3/17/2016              -s/p Mupirocin initiated 2/16/2015              -s/p Valtrex initiated 2/6/2015              -s/p biopsy showing hemorraghic scale crust and ulceration 2/3/2015  5. Drug exanthem, 2/2 to tamsulosin              -s/p biopsy showing interface dermatitis 12/29/2014              -s/p lidex initiated 1/8/2015              -s/p clobetasol initiated 12/29/2014-improved  6. Traumatic tattoo              -Central forehead, measured 1X1.5cm on 10/16/17  7. Rosacea              -Metronidazole cream    Encounter Date: Mar 21, 2022    CC:   Chief Complaint   Patient presents with     Skin Check     pt states he is here for a full body skin check.         History of Present Illness:  Mr. Jacinto Flannery is a 81 year old male who presents for 6 month skin check, concern for new dry spot above his left eyebrow. The patient was last seen 6/2021 when he had cryotherapy for AKs. Patient has no specific concerns  today. Recently went to [place] and likes to golf and bike, [place].    Denies other bleeding, tender or non healing lesions.    Past Medical History:   Patient Active Problem List   Diagnosis     CAD S/P percutaneous coronary angioplasty     CAD (coronary artery disease)     BPH (benign prostatic hyperplasia)     Hyperlipidemia with target LDL less than 70     S/P coronary artery stent placement     GERD (gastroesophageal reflux disease)     S/P TURP     Dermatitis     AK (actinic keratosis)     SCC (squamous cell carcinoma)     SK (seborrheic keratosis)     EIC (epidermal inclusion cyst)     History of squamous cell carcinoma     Seborrheic keratosis     Inflamed seborrheic keratosis     History of dysplastic nevus     Varicose veins     Medication reaction, initial encounter     HSV (herpes simplex virus) infection     Hypertension     Insomnia     Thrombocytopenia (H)     Benign prostatic hyperplasia with urinary obstruction     Hypertrophy of prostate without urinary obstruction and other lower urinary tract symptoms (LUTS)     Atherosclerosis of coronary artery     Recurrent coronary arteriosclerosis after percutaneous transluminal coronary angioplasty     History of atypical nevus     Hyperlipidemia     History of transurethral resection of prostate     Presence of coronary angioplasty implant and graft     Squamous cell carcinoma of skin     Varicose veins of other specified sites     Past Medical History:   Diagnosis Date     BPH (benign prostatic hyperplasia)      CAD (coronary artery disease)     MI 1989, CABG 1990, 11/2014 PCI to LM, LCx and RCA     Conductive hearing loss Unknown.     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     Skin disease      Squamous cell carcinoma      Past Surgical History:   Procedure Laterality Date     CHOLECYSTECTOMY  1992    in [place]     ENDOSCOPY  04/26/2013    Done at [facility] in [place], [place]     GENITOURINARY SURGERY       HEART CATH STENT COR W/WO PTCA  11/12/2014    x 4 ANKUR's (two ostial to mid-RCA, one ostial LCx and mid-LM, one ostial LAD)     HERNIA REPAIR Right 2010    in [place]     LASER KTP GREEN LIGHT PHOTOSELECTIVE VAPORIZATION PROSTATE N/A 1/5/2015    Procedure: LASER KTP GREEN LIGHT PHOTOSELECTIVE VAPORIZATION PROSTATE;  Surgeon: Natalie Porter MD;  Location: [facility] OR     MOHS MICROGRAPHIC PROCEDURE       [facility] CABG, VEIN, THREE  1990    [facility] in [place]       Social History:  Patient reports that he quit smoking about 51 years ago. His smoking use included cigarettes. He started smoking about 56 years ago. He has a 0.50 pack-year smoking history. He has never used smokeless tobacco. He reports current alcohol use. He reports that he does not use drugs.    Family History:  Family History   Problem Relation Age of Onset     Cancer Father         Stomach     Heart Disease Mother      Heart Disease Maternal Grandmother      Skin Cancer No family hx of      Melanoma No family hx of        Medications:  Current Outpatient Medications   Medication Sig Dispense Refill     alfuzosin ER (UROXATRAL) 10 MG 24 hr tablet Take 1 tablet (10 mg) by mouth daily 90 tablet 3     aspirin 81 MG tablet Take 81 mg by mouth daily       carBAMazepine (EPITOL) 200 MG tablet Take 1 tablet (200 mg) by mouth 2 times daily as needed (for trigeminal neuralgia) 30 tablet 3     Cyanocobalamin (B-12) 1000 MCG CAPS Take 1 capsule by mouth daily        eszopiclone (LUNESTA) 2 MG tablet TAKE 1 TABLET BY MOUTH NIGHTLY AS NEEDED FOR SLEEP 15 tablet 5     melatonin 1 MG CAPS        metoprolol  succinate ER (TOPROL-XL) 25 MG 24 hr tablet TAKE 1/2 (ONE-HALF) TABLET BY MOUTH ONCE DAILY 45 tablet 3     metroNIDAZOLE (METROCREAM) 0.75 % external cream Apply topically 2 times daily 45 g 11     nitroGLYcerin (NITROSTAT) 0.4 MG sublingual tablet Place 1 tablet (0.4 mg) under the tongue every 5 minutes as needed for chest pain *DO NOT EXCEED A TOTAL OF 3 DOSES IN 15 MINUTES. 25 tablet 11     simvastatin (ZOCOR) 40 MG tablet Take 1 tablet (40 mg) by mouth At Bedtime 90 tablet 3     valACYclovir (VALTREX) 1000 mg tablet Take 1 pill twice a day for 3 days at the first sign of a cold core. 6 tablet 3        Allergies   Allergen Reactions     Macrodantin [Nitrofurantoin] Rash     Sulfa Drugs Rash     Tamsulosin Rash         Review of Systems:  -Constitutional: Otherwise feeling well today, in usual state of health.  -Skin: As above in HPI. No additional skin concerns.    Physical exam:  Vitals: There were no vitals taken for this visit.  GEN: This is a well developed, well-nourished male in no acute distress, in a pleasant mood.    SKIN: Full skin, which includes the head/face, both arms, chest, back, abdomen,both legs, buttocks, digits and/or nails, was examined.  -Villatoro skin type: I  -There is on gritty pink papule on left temple  -Waxy stuck on papules and trunk and extremities, few.   - stuck on waxy brown papule on right tragus of ear  - light brown papule, regular on right antihelix  - scattered telangiectasias with background of erythema  -No other lesions of concern on areas examined.     Impression/Plan:  1. Actinic keratosis  - Cryotherapy procedure note: After verbal consent and discussion of risks and benefits including but no limited to dyspigmentation/scar, blister, and pain, 1 lesion above left temple was(were) treated with 1-2mm freeze border for 2 cycles with liquid nitrogen. Post cryotherapy instructions were provided.    2.  SK's, lentigines,  Nevi  - Counseled on benign etiology    3.  Rosacea  - Continue metronidazole cream bid prn, face    4. Hx NMSC  - NERD  - Counseled on sun protection, handouts provided      CC Referred Self, MD  No address on file on close of this encounter.  Follow-up in 6 months, earlier for new or changing lesions.     Christi Courtney PGY4  Dermatology Resident  pager      Dr. Quarles staffed the patient.  I was present for the entire procedure. Anjelica Quarles MD  I, Anjelica Quarles MD, saw this patient with the resident and agree with the resident s findings and plan of care as documented in the resident s note.

## 2022-09-29 ENCOUNTER — PRE VISIT (OUTPATIENT)
Dept: UROLOGY | Facility: CLINIC | Age: 82
End: 2022-09-29

## 2022-10-10 ENCOUNTER — TELEPHONE (OUTPATIENT)
Dept: INTERNAL MEDICINE | Facility: CLINIC | Age: 82
End: 2022-10-10

## 2022-10-10 DIAGNOSIS — E78.5 HYPERLIPIDEMIA WITH TARGET LDL LESS THAN 70: Primary | ICD-10-CM

## 2022-10-10 DIAGNOSIS — Z00.00 ROUTINE ADULT HEALTH MAINTENANCE: ICD-10-CM

## 2022-10-10 NOTE — TELEPHONE ENCOUNTER
Called patient.  Lab orders placed.  CMP and fasting lipid.  Instruct pt to fast 8-10 hours.  Pt will call to schedule lab appointment.      West Curry CMA ([facility]) at 1:30 PM on 10/10/2022

## 2022-10-10 NOTE — TELEPHONE ENCOUNTER
[facility] Call Center    Phone Message    May a detailed message be left on voicemail: yes     Reason for Call: Order(s): Other:   Reason for requested: labs  Date needed: 10/13  Provider name: Fadi chacon      Action Taken: Message routed to:  [facility] ([facility]): pcc    Travel Screening: Not Applicable

## 2022-10-10 NOTE — TELEPHONE ENCOUNTER
Patient requesting order for the following labs, Comprehensive metabolic panel, Lipid Profile FASTING

## 2022-10-13 ENCOUNTER — LAB (OUTPATIENT)
Dept: LAB | Facility: CLINIC | Age: 82
End: 2022-10-13
Payer: MEDICARE

## 2022-10-13 DIAGNOSIS — Z00.00 ROUTINE ADULT HEALTH MAINTENANCE: ICD-10-CM

## 2022-10-13 DIAGNOSIS — E78.5 HYPERLIPIDEMIA WITH TARGET LDL LESS THAN 70: ICD-10-CM

## 2022-10-13 LAB
ALBUMIN SERPL BCG-MCNC: 4.5 G/DL (ref 3.5–5.2)
ALP SERPL-CCNC: 61 U/L (ref 40–129)
ALT SERPL W P-5'-P-CCNC: 23 U/L (ref 10–50)
ANION GAP SERPL CALCULATED.3IONS-SCNC: 8 MMOL/L (ref 7–15)
AST SERPL W P-5'-P-CCNC: 33 U/L (ref 10–50)
BILIRUB SERPL-MCNC: 0.6 MG/DL
BUN SERPL-MCNC: 21.2 MG/DL (ref 8–23)
CALCIUM SERPL-MCNC: 9.6 MG/DL (ref 8.8–10.2)
CHLORIDE SERPL-SCNC: 104 MMOL/L (ref 98–107)
CHOLEST SERPL-MCNC: 147 MG/DL
CREAT SERPL-MCNC: 0.86 MG/DL (ref 0.67–1.17)
DEPRECATED HCO3 PLAS-SCNC: 29 MMOL/L (ref 22–29)
GFR SERPL CREATININE-BSD FRML MDRD: 88 ML/MIN/1.73M2
GLUCOSE SERPL-MCNC: 90 MG/DL (ref 70–99)
HDLC SERPL-MCNC: 52 MG/DL
LDLC SERPL CALC-MCNC: 78 MG/DL
NONHDLC SERPL-MCNC: 95 MG/DL
POTASSIUM SERPL-SCNC: 4.3 MMOL/L (ref 3.4–5.3)
PROT SERPL-MCNC: 7 G/DL (ref 6.4–8.3)
SODIUM SERPL-SCNC: 141 MMOL/L (ref 136–145)
TRIGL SERPL-MCNC: 84 MG/DL

## 2022-10-13 PROCEDURE — 80061 LIPID PANEL: CPT | Performed by: INTERNAL MEDICINE

## 2022-10-13 PROCEDURE — 36415 COLL VENOUS BLD VENIPUNCTURE: CPT | Performed by: PATHOLOGY

## 2022-10-13 PROCEDURE — 80053 COMPREHEN METABOLIC PANEL: CPT | Performed by: PATHOLOGY

## 2022-10-18 ENCOUNTER — TRANSFERRED RECORDS (OUTPATIENT)
Dept: HEALTH INFORMATION MANAGEMENT | Facility: CLINIC | Age: 82
End: 2022-10-18

## 2022-10-19 ENCOUNTER — OFFICE VISIT (OUTPATIENT)
Dept: INTERNAL MEDICINE | Facility: CLINIC | Age: 82
End: 2022-10-19
Payer: MEDICARE

## 2022-10-19 ENCOUNTER — OFFICE VISIT (OUTPATIENT)
Dept: DERMATOLOGY | Facility: CLINIC | Age: 82
End: 2022-10-19
Payer: MEDICARE

## 2022-10-19 VITALS
WEIGHT: 161.2 LBS | DIASTOLIC BLOOD PRESSURE: 62 MMHG | HEART RATE: 67 BPM | SYSTOLIC BLOOD PRESSURE: 107 MMHG | OXYGEN SATURATION: 97 % | HEIGHT: 70 IN | BODY MASS INDEX: 23.08 KG/M2

## 2022-10-19 DIAGNOSIS — L82.1 SK (SEBORRHEIC KERATOSIS): Primary | ICD-10-CM

## 2022-10-19 DIAGNOSIS — Z98.61 STATUS POST PERCUTANEOUS TRANSLUMINAL CORONARY ANGIOPLASTY: ICD-10-CM

## 2022-10-19 DIAGNOSIS — G47.00 PERSISTENT INSOMNIA: ICD-10-CM

## 2022-10-19 DIAGNOSIS — I25.812 CORONARY ARTERY DISEASE INVOLVING BYPASS GRAFT OF TRANSPLANTED HEART WITHOUT ANGINA PECTORIS: ICD-10-CM

## 2022-10-19 DIAGNOSIS — I25.10 CORONARY ARTERY DISEASE INVOLVING NATIVE CORONARY ARTERY OF NATIVE HEART WITHOUT ANGINA PECTORIS: ICD-10-CM

## 2022-10-19 DIAGNOSIS — G50.0 TRIGEMINAL NEURALGIA: ICD-10-CM

## 2022-10-19 DIAGNOSIS — Z85.828 HISTORY OF SKIN CANCER: ICD-10-CM

## 2022-10-19 PROCEDURE — 99213 OFFICE O/P EST LOW 20 MIN: CPT | Performed by: DERMATOLOGY

## 2022-10-19 PROCEDURE — G0439 PPPS, SUBSEQ VISIT: HCPCS | Performed by: INTERNAL MEDICINE

## 2022-10-19 RX ORDER — CARBAMAZEPINE 200 MG/1
200 TABLET ORAL 2 TIMES DAILY PRN
Qty: 30 TABLET | Refills: 3 | Status: SHIPPED | OUTPATIENT
Start: 2022-10-19

## 2022-10-19 RX ORDER — NITROGLYCERIN 0.4 MG/1
0.4 TABLET SUBLINGUAL EVERY 5 MIN PRN
Qty: 15 TABLET | Refills: 3 | Status: SHIPPED | OUTPATIENT
Start: 2022-10-19

## 2022-10-19 RX ORDER — METOPROLOL SUCCINATE 25 MG/1
TABLET, EXTENDED RELEASE ORAL
Qty: 45 TABLET | Refills: 3 | Status: SHIPPED | OUTPATIENT
Start: 2022-10-19 | End: 2023-01-19

## 2022-10-19 RX ORDER — ESZOPICLONE 2 MG/1
2 TABLET, FILM COATED ORAL
Qty: 15 TABLET | Refills: 5 | Status: SHIPPED | OUTPATIENT
Start: 2022-10-19

## 2022-10-19 RX ORDER — SIMVASTATIN 40 MG
40 TABLET ORAL AT BEDTIME
Qty: 90 TABLET | Refills: 3 | Status: SHIPPED | OUTPATIENT
Start: 2022-10-19 | End: 2023-01-19

## 2022-10-19 ASSESSMENT — PAIN SCALES - GENERAL: PAINLEVEL: NO PAIN (0)

## 2022-10-19 NOTE — PROGRESS NOTES
[facility] Dermatology Note  Encounter Date: Oct 19, 2022  Office Visit     Dermatology Problem List:  1.  NMSC              -SCC, left (dorsal) forearm, s/p excision 8/18/2015              -SCC, right dorsal hand, s/p Mohs 3/18/2015  2. Compound nevus with moderate dysplasia, mid upper back              -s/p biopsy 10/16/2015  3. Actinic keratosis                -s/p cryotherapy 07/20/2016, 03/06/19              -efudex in November 2016 (used for 19 days), pt had a very robust response              -pt given hydrocortisone 2.5% cream BID PRN due to severe response and told to stop efudex  4. Unknown skin eruption penis. Resolved 7/5/17              -s/p triamcinolone cream initiated 3/17/2016              -s/p Nystatin initiated 3/17/2016              -s/p Mupirocin initiated 2/16/2015              -s/p Valtrex initiated 2/6/2015              -s/p biopsy showing hemorraghic scale crust and ulceration 2/3/2015  5. Drug exanthem, 2/2 to tamsulosin              -s/p biopsy showing interface dermatitis 12/29/2014              -s/p lidex initiated 1/8/2015              -s/p clobetasol initiated 12/29/2014-improved  6. Traumatic tattoo              -Central forehead, measured 1X1.5cm on 10/16/17  7. Rosacea              -Metronidazole cream    ____________________________________________    Assessment & Plan:     # Benign findings: few nevi, seborrheic keratoses  - Continue good sun protection.  Reviewed sun protective clothing.  Recommend [employer] website      # Rosacea- well controlled.   - Metrocream    # History of skin cancer- NERD         Follow-up: 6 month(s) in-person, or earlier for new or changing lesions    Staff:     Anjelica Quarles MD  ____________________________________________    CC: Skin Check (Full body skin check - Area of concern on back of scalp)    HPI:  Mr. Jacinto Flannery is a(n) 82 year old male who presents today as a return patient for a skin check.  Notes a few itchy scaly  on scalp but one has resolved.  No tender or bleeding lesions.  Wants to continue good sun protection and use both sunscreen and sun protective clothing    Patient is otherwise feeling well, without additional skin concerns.     Labs Reviewed:  N/A    Physical Exam:  Vitals: There were no vitals taken for this visit.  SKIN: Total skin excluding the undergarment areas but including buttocks was performed. The exam included the head/face, neck, both arms, chest, back, abdomen, both legs, digits and/or nails.   - few dark brown nevi with reticular pattern on dermoscopy  - There are waxy stuck on tan to brown papules on the exam, including the lesion of concern in the scalp..   - There is no erythema, telangectasias, nodularity, or pigmentation on the previous skin cancer sites..     - No other lesions of concern on areas examined.     Medications:  Current Outpatient Medications   Medication     alfuzosin ER (UROXATRAL) 10 MG 24 hr tablet     aspirin 81 MG tablet     carBAMazepine (EPITOL) 200 MG tablet     eszopiclone (LUNESTA) 2 MG tablet     melatonin 1 MG CAPS     metoprolol succinate ER (TOPROL-XL) 25 MG 24 hr tablet     metroNIDAZOLE (METROCREAM) 0.75 % external cream     nitroGLYcerin (NITROSTAT) 0.4 MG sublingual tablet     simvastatin (ZOCOR) 40 MG tablet     valACYclovir (VALTREX) 1000 mg tablet     Cyanocobalamin (B-12) 1000 MCG CAPS     Current Facility-Administered Medications   Medication     lidocaine 1% with EPINEPHrine 1:100,000 injection 0.5 mL      Past Medical History:   Patient Active Problem List   Diagnosis     CAD S/P percutaneous coronary angioplasty     CAD (coronary artery disease)     BPH (benign prostatic hyperplasia)     Hyperlipidemia with target LDL less than 70     S/P coronary artery stent placement     GERD (gastroesophageal reflux disease)     S/P TURP     Dermatitis     AK (actinic keratosis)     SCC (squamous cell carcinoma)     SK (seborrheic keratosis)     EIC (epidermal  inclusion cyst)     History of squamous cell carcinoma     Seborrheic keratosis     Inflamed seborrheic keratosis     History of dysplastic nevus     Varicose veins     Medication reaction, initial encounter     HSV (herpes simplex virus) infection     Hypertension     Insomnia     Thrombocytopenia (H)     Benign prostatic hyperplasia with urinary obstruction     Hypertrophy of prostate without urinary obstruction and other lower urinary tract symptoms (LUTS)     Atherosclerosis of coronary artery     Recurrent coronary arteriosclerosis after percutaneous transluminal coronary angioplasty     History of atypical nevus     Hyperlipidemia     History of transurethral resection of prostate     Presence of coronary angioplasty implant and graft     Squamous cell carcinoma of skin     Varicose veins of other specified sites     Past Medical History:   Diagnosis Date     BPH (benign prostatic hyperplasia)      CAD (coronary artery disease)     MI 1989, CABG 1990, 11/2014 PCI to LM, LCx and RCA     Conductive hearing loss Unknown.     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     Skin disease      Squamous cell carcinoma        CC Referred Self, MD  No address on file on close of this encounter.

## 2022-10-19 NOTE — NURSING NOTE
"Jacinto Flannery is a 82 year old male patient that presents today in clinic for the following:    Chief Complaint   Patient presents with     Physical     Annual Medicare wellness exam     The patient's allergies and medications were reviewed as noted. A set of vitals were recorded as noted without incident: /62 (BP Location: Right arm, Patient Position: Sitting, Cuff Size: Adult Regular)   Pulse 67   Ht 1.778 m (5' 10\")   Wt 73.1 kg (161 lb 3.2 oz)   SpO2 97%   BMI 23.13 kg/m  . The patient does not have any other questions for the provider.    Marin Hernandez, Visit Facilitator 1:45 PM on 10/19/2022   "

## 2022-10-19 NOTE — LETTER
10/19/2022       RE: Jacinto Flannery  [address]  [place] [address]     Dear Colleague,    Thank you for referring your patient, Jacinto Flannery, to the [facility] DERMATOLOGY CLINIC [place] at [facility]. Please see a copy of my visit note below.    [facility] Dermatology Note  Encounter Date: Oct 19, 2022  Office Visit     Dermatology Problem List:  1.  NMSC              -SCC, left (dorsal) forearm, s/p excision 8/18/2015              -SCC, right dorsal hand, s/p Mohs 3/18/2015  2. Compound nevus with moderate dysplasia, mid upper back              -s/p biopsy 10/16/2015  3. Actinic keratosis                -s/p cryotherapy 07/20/2016, 03/06/19              -efudex in November 2016 (used for 19 days), pt had a very robust response              -pt given hydrocortisone 2.5% cream BID PRN due to severe response and told to stop efudex  4. Unknown skin eruption penis. Resolved 7/5/17              -s/p triamcinolone cream initiated 3/17/2016              -s/p Nystatin initiated 3/17/2016              -s/p Mupirocin initiated 2/16/2015              -s/p Valtrex initiated 2/6/2015              -s/p biopsy showing hemorraghic scale crust and ulceration 2/3/2015  5. Drug exanthem, 2/2 to tamsulosin              -s/p biopsy showing interface dermatitis 12/29/2014              -s/p lidex initiated 1/8/2015              -s/p clobetasol initiated 12/29/2014-improved  6. Traumatic tattoo              -Central forehead, measured 1X1.5cm on 10/16/17  7. Rosacea              -Metronidazole cream    ____________________________________________    Assessment & Plan:     # Benign findings: few nevi, seborrheic keratoses  - Continue good sun protection.  Reviewed sun protective clothing.  Recommend [employer] website      # Rosacea- well controlled.   - Metrocream    # History of skin cancer- NERD         Follow-up: 6 month(s) in-person, or earlier for new or  changing lesions    Staff:     Anjelica Quarles MD  ____________________________________________    CC: Skin Check (Full body skin check - Area of concern on back of scalp)    HPI:  Mr. Jacinto Flannery is a(n) 82 year old male who presents today as a return patient for a skin check.  Notes a few itchy scaly on scalp but one has resolved.  No tender or bleeding lesions.  Wants to continue good sun protection and use both sunscreen and sun protective clothing    Patient is otherwise feeling well, without additional skin concerns.     Labs Reviewed:  N/A    Physical Exam:  Vitals: There were no vitals taken for this visit.  SKIN: Total skin excluding the undergarment areas but including buttocks was performed. The exam included the head/face, neck, both arms, chest, back, abdomen, both legs, digits and/or nails.   - few dark brown nevi with reticular pattern on dermoscopy  - There are waxy stuck on tan to brown papules on the exam, including the lesion of concern in the scalp..   - There is no erythema, telangectasias, nodularity, or pigmentation on the previous skin cancer sites..     - No other lesions of concern on areas examined.     Medications:  Current Outpatient Medications   Medication     alfuzosin ER (UROXATRAL) 10 MG 24 hr tablet     aspirin 81 MG tablet     carBAMazepine (EPITOL) 200 MG tablet     eszopiclone (LUNESTA) 2 MG tablet     melatonin 1 MG CAPS     metoprolol succinate ER (TOPROL-XL) 25 MG 24 hr tablet     metroNIDAZOLE (METROCREAM) 0.75 % external cream     nitroGLYcerin (NITROSTAT) 0.4 MG sublingual tablet     simvastatin (ZOCOR) 40 MG tablet     valACYclovir (VALTREX) 1000 mg tablet     Cyanocobalamin (B-12) 1000 MCG CAPS     Current Facility-Administered Medications   Medication     lidocaine 1% with EPINEPHrine 1:100,000 injection 0.5 mL      Past Medical History:   Patient Active Problem List   Diagnosis     CAD S/P percutaneous coronary angioplasty     CAD (coronary artery disease)     BPH  (benign prostatic hyperplasia)     Hyperlipidemia with target LDL less than 70     S/P coronary artery stent placement     GERD (gastroesophageal reflux disease)     S/P TURP     Dermatitis     AK (actinic keratosis)     SCC (squamous cell carcinoma)     SK (seborrheic keratosis)     EIC (epidermal inclusion cyst)     History of squamous cell carcinoma     Seborrheic keratosis     Inflamed seborrheic keratosis     History of dysplastic nevus     Varicose veins     Medication reaction, initial encounter     HSV (herpes simplex virus) infection     Hypertension     Insomnia     Thrombocytopenia (H)     Benign prostatic hyperplasia with urinary obstruction     Hypertrophy of prostate without urinary obstruction and other lower urinary tract symptoms (LUTS)     Atherosclerosis of coronary artery     Recurrent coronary arteriosclerosis after percutaneous transluminal coronary angioplasty     History of atypical nevus     Hyperlipidemia     History of transurethral resection of prostate     Presence of coronary angioplasty implant and graft     Squamous cell carcinoma of skin     Varicose veins of other specified sites     Past Medical History:   Diagnosis Date     BPH (benign prostatic hyperplasia)      CAD (coronary artery disease)     MI 1989, CABG 1990, 11/2014 PCI to LM, LCx and RCA     Conductive hearing loss Unknown.     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     Skin disease      Squamous cell carcinoma        CC Referred Self, MD  No address on file on close of this encounter.

## 2022-10-19 NOTE — PROGRESS NOTES
"Jacinto was seen today for physical and wellness exam.  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     He feels well and continues to be very active.  [place]  He came in earlier for labs and we reviewed them together, today, results of the lipid panel and comprehensive metabolic profile are all normal/favorable.  He does follow up with Urology regarding BPH.    No changes to past, family or social history and  ROS: 10 point ROS neg other than the symptoms noted above in the HPI.     PHYSICAL EXAM:  /62 (BP Location: Right arm, Patient Position: Sitting, Cuff Size: Adult Regular)   Pulse 67   Ht 1.778 m (5' 10\")   Wt 73.1 kg (161 lb 3.2 oz)   SpO2 97%   BMI 23.13 kg/m    Constitutional: no distress, comfortable, pleasant   Eyes: anicteric, normal extra-ocular movements   Ears, Nose and Throat: tympanic membranes clear, neck supple with full range of motion, no thyromegaly.   Cardiovascular: regular rate and rhythm, normal S1 and S2, no murmurs, rubs or gallops, peripheral pulses full and symmetric   Respiratory: clear to auscultation, no wheezes or crackles, normal breath sounds   Gastrointestinal: positive bowel sounds, nontender, no hepatosplenomegaly, no masses   Musculoskeletal: knees full range of motion, no effusion   Skin: no concerning lesions, no jaundice   Neurological:  normal gait, no tremor   Psychological: " appropriate mood   Lymphatic: no cervical, axillary or inguinal lymphadenopathy        A/P 82 year old male here for physical/wellness visit, doing very well, enjoying good health.  Reviewed labs; made no changes today, will refill medication    Coronary artery disease involving bypass graft of transplanted heart without angina pectoris  -     metoprolol succinate ER (TOPROL XL) 25 MG 24 hr tablet; TAKE 1/2 (ONE-HALF) TABLET BY MOUTH ONCE DAILY    Coronary artery disease involving native coronary artery of native heart without angina pectoris  -     simvastatin (ZOCOR) 40 MG tablet; Take 1 tablet (40 mg) by mouth At Bedtime    Trigeminal neuralgia  -     carBAMazepine (EPITOL) 200 MG tablet; Take 1 tablet (200 mg) by mouth 2 times daily as needed (for trigeminal neuralgia)    Status post percutaneous transluminal coronary angioplasty  -     nitroGLYcerin (NITROSTAT) 0.4 MG sublingual tablet; Place 1 tablet (0.4 mg) under the tongue every 5 minutes as needed for chest pain *DO NOT EXCEED A TOTAL OF 3 DOSES IN 15 MINUTES.    Persistent insomnia  -     eszopiclone (LUNESTA) 2 MG tablet; Take 1 tablet (2 mg) by mouth nightly as needed                Medicare Annual Wellness Questionnaire:  This 82 year old year old male presents for a Medicare Wellness Exam.    Providers/clinics involved in care:  Patient Care Team       Relationship Specialty Notifications Start End    Nadine Alonso MD PCP - General Internal Medicine  11/3/15     Phone: [phone] Pager: [phone] Fax: [phone] [address] [address]    Minal Maxwell RN Clinic Care Coordinator Cardiology Admissions 11/13/14     Phone: [phone] Pager: [phone]        Tristen Menezes MD Referring Physician Emergency Medicine  12/1/14     Phone: [phone] Fax: [phone]         [facility] [address]    Justice Paiz MD MD Internal Medicine  7/13/15      Phone: [phone] Fax: [phone]         [address] [place] [address]    Della Kapoor PA-C Physician Assistant Physician Assistant  7/27/15     Phone: [phone] Fax: [phone]         [address] [facility] [address] [facility] [address]    Nadine Alonso MD MD Internal Medicine  9/9/15     Phone: [phone] Pager: [phone] Fax: [phone]        [address] [place] [address]    Natalie Porter MD MD Urology  4/1/16     Phone: [phone] Fax: [phone]         [address] [facility] [address] [facility] [address]    Anjelica Quarles MD MD Dermatology  11/6/17     Phone: [phone] Fax: [phone]         [address] [place] [address] [address] [facility] [address]    Gill Melendez PA-C Physician Assistant Physician Assistant  12/15/17     Phone: [phone] Fax: [phone]         [address] [place] [address]    Karen Akins MD MD Clinical Cardiac Electrophysiology  9/25/20     Phone: [phone] Fax: [phone]         [address] [place] [address]    Anjelica Quarles MD Assigned Surgical Provider   1/10/21     Phone: [phone] Fax: [phone]         [address] [place] [address]    Monse Booth, RN Specialty Care Coordinator Clinical Cardiac Electrophysiology Admissions 1/14/21     Nadine Alonso MD Assigned PCP   1/17/21     Phone: [phone] Pager: [phone] Fax: [phone]        [address] [place] [address]    Anjelica Qualres MD MD Dermatology  4/6/22     Phone: [phone] Fax: [phone]         [address] [place] [address] [address] [facility] [address]          Fall Risk Assessment:    Have you fallen 2 or more times in the last year?  No    How many times were you injured due to a fall in the last year?  0    PHQ-2:    Over the last 2 weeks, how often have you been bothered by feeling down, depressed, or hopeless?     Not at all (0)    Over the last  "2 weeks, how often have you had little interest or pleasure in doing things?     Not at all (0)    Social History:    What is your marital status?      Who lives in your household?  spouse    Does your home have loose rugs in the hallway:     No    Does your home have grab bars in the bathroom:    No    Does your home have handrails on the stairs?  Yes     Does your home have poorly lit areas?    No    Do you feel threatened or controlled by a partner, ex-partner or anyone in your life?   No    Has anyone hurt you physically, for example by pushing, hitting, slapping or kicking you   or forcing you to have sex?   No    Do you need help with the phone, transportation, shopping, preparing meals, housework, laundry, medications or managing money?   No    Sexual Health:    Are you sexually active?    Yes     If yes, with men, women, or both?  Women    If yes, how many partners?  1    If yes, are you using condoms?    No    Have you had any sexually transmitted infections in the last year?   No    Do you have any sexual concerns?    N/a      General Health Assessment:    Have you noticed any hearing difficulties?   Yes     Do you wear hearing aids?   Yes     Have you seen a hearing professional such as an audiologist in the last 1 year?   No    Do you have vision difficulty?    Yes     Do you wear glasses or contacts?   Yes     Have you seen an eye doctor in the last 1 year?   Yes     How many servings of fruits and vegetables do you eat a day?  2 and 3    How often do you exercise in a week?  5    How long and what kind of exercise do you do?  45 min brisk walking or \"construction\"    Tobacco and Alcohol History:    Do you use tobacco/nicotine products?    No    If yes, please list the method of use and average weekly consumption?  n/a    Do you use any other drugs?   No         Do you drink alcohol?   No    If you drink alcohol, how many drinks per week?  n/a      Advance Directive:    Have you completed an " Advance Directives document?  Yes     If yes, have you given a copy to the clinic?   Yes     Do you need information on Advance Directives?   No

## 2022-10-19 NOTE — NURSING NOTE
Chief Complaint   Patient presents with     Skin Check     Full body skin check - Area of concern on back of scalp     Christian Mera, EMT

## 2022-10-25 ENCOUNTER — OFFICE VISIT (OUTPATIENT)
Dept: UROLOGY | Facility: CLINIC | Age: 82
End: 2022-10-25
Payer: MEDICARE

## 2022-10-25 VITALS
DIASTOLIC BLOOD PRESSURE: 65 MMHG | HEIGHT: 70 IN | HEART RATE: 78 BPM | SYSTOLIC BLOOD PRESSURE: 136 MMHG | WEIGHT: 154 LBS | BODY MASS INDEX: 22.05 KG/M2

## 2022-10-25 DIAGNOSIS — N40.1 BENIGN NON-NODULAR PROSTATIC HYPERPLASIA WITH LOWER URINARY TRACT SYMPTOMS: Primary | ICD-10-CM

## 2022-10-25 PROCEDURE — 99213 OFFICE O/P EST LOW 20 MIN: CPT | Performed by: PHYSICIAN ASSISTANT

## 2022-10-25 RX ORDER — ALFUZOSIN HYDROCHLORIDE 10 MG/1
1 TABLET, EXTENDED RELEASE ORAL DAILY
Qty: 90 TABLET | Refills: 3 | Status: SHIPPED | OUTPATIENT
Start: 2022-10-25 | End: 2023-01-02

## 2022-10-25 ASSESSMENT — PAIN SCALES - GENERAL: PAINLEVEL: NO PAIN (0)

## 2022-10-25 NOTE — PATIENT INSTRUCTIONS
UROLOGY CLINIC VISIT PATIENT INSTRUCTIONS    Follow up in 1 year with Gill Melendez PA-C.    Continue alfuzosin 10 mg once daily. Your medication was refilled.     If you have any issues, questions or concerns in the meantime, do not hesitate to contact us at [phone] or via [employer].     It was a pleasure meeting with you today.  Thank you for allowing me and my team the privilege of caring for you today.  YOU are the reason we are here, and I truly hope we provided you with the excellent service you deserve.  Please let us know if there is anything else we can do for you so that we can be sure you are leaving completely satisfied with your care experience.

## 2022-10-25 NOTE — PROGRESS NOTES
UROLOGY OFFICE VISIT FOLLOW-UP NOTE           Chief Complaint:   Annual follow up          Assessment/Plan   82 year old male with history of urinary retention s/p PVP in 2014, doing well with minimal symptoms. He is not having any bothersome LUTS, UTIs, or urinary incontinence. Recent kidney function within normal limits. He is happy to continue with the same regimen of alfuzosin 10 mg once daily. We also discussed PSA screening and that this is no longer recommended after age 75. He agrees.  -Continue alfuzosin 10 mg once daily. Renewed.   -Follow up in 1 year, sooner with any issues.    Gill Melendez PA-C  Department of Urology         Interval Update   Jacinto Flannery is a very pleasant 81 yo M with hx of urinary retention.  He underwent PVP with Dr. Porter in 1/2015. Saw Dr. Pritchett on 9/22/2020. History from this date as follows:    He has had some residual symptoms and has managed these conservatively over the years.  He is not bothered currently. He has had some elevated PVRs in the past.  At one point underwent VUDS but there was difficulty getting the cather in.  One year ago had a PVR of 30 and a cystoscopy around that time showed minimal regrowth with an open bladder neck.    Today notes: He is very pleased with the way he voids.  Denies slow stream, hesitancy, UTI or hematuria.  He takes alfuzosin.    On that date, he was recommended to continue alfuzosin and follow up for PVR as needed.    I then saw him back on 10/26/21:   He feels to be voiding well. Has nocturia 1 time per night and no daytime symptoms. He double voids about once per day to ensure complete bladder emptying. Occasionally measures his urine output when he double/triple voids with average voided volumes as follows: first void: 300 mL, second void: 100 mL, third void: 50 mL. He denies any urinary incontinence or UTIs. He had a PVR in the nurse clinic on 11/2/2020 which was very low at 10 mL. Recent kidney function a few weeks ago  "was within normal limits.     TODAY   10/25/22:  Doing well. He has no concerns related to his urinary habits. Feels to be voiding well. No UTIs in recent history. Continues to take alfuzosin 10 mg once daily.     Physical Exam:    /65   Pulse 78   Ht 1.778 m (5' 10\")   Wt 69.9 kg (154 lb)   BMI 22.10 kg/m    GENERAL: Healthy, alert and no distress  EYES: Eyes grossly normal to inspection.  No discharge or erythema, or obvious scleral/conjunctival abnormalities.  RESP: No audible wheeze, cough, or visible cyanosis.  No visible retractions or increased work of breathing.    SKIN: Visible skin clear. No significant rash, abnormal pigmentation or lesions.  NEURO: Cranial nerves grossly intact.  Mentation and speech appropriate for age.  PSYCH: Mentation appears normal, affect normal/bright, judgement and insight intact, normal speech and appearance well-groomed.      Labs and Pathology:      Creatinine   Date Value Ref Range Status   10/13/2022 0.86 0.67 - 1.17 mg/dL Final   12/22/2020 0.89 0.66 - 1.25 mg/dL Final       No results found for: PSA         Past Medical History:     Past Medical History:   Diagnosis Date     BPH (benign prostatic hyperplasia)      CAD (coronary artery disease)     MI 1989, CABG 1990, 11/2014 PCI to LM, LCx and RCA     Conductive hearing loss Unknown.     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     Skin disease      Squamous cell carcinoma             Past Surgical History:     Past Surgical History:   Procedure Laterality Date     CHOLECYSTECTOMY  1992    in [place]     ENDOSCOPY  04/26/2013    Done at " [facility] in [place], [place]     GENITOURINARY SURGERY       HEART CATH STENT COR W/WO PTCA  11/12/2014    x 4 ANKUR's (two ostial to mid-RCA, one ostial LCx and mid-LM, one ostial LAD)     HERNIA REPAIR Right     in [place]     LASER KTP GREEN LIGHT PHOTOSELECTIVE VAPORIZATION PROSTATE N/A 2015    Procedure: LASER KTP GREEN LIGHT PHOTOSELECTIVE VAPORIZATION PROSTATE;  Surgeon: Natalie Porter MD;  Location: [facility] OR     MOHS MICROGRAPHIC PROCEDURE       ZZC CABG, VEIN, THREE      SVG-LAD in [place]            Medications     Current Outpatient Medications   Medication     alfuzosin ER (UROXATRAL) 10 MG 24 hr tablet     aspirin 81 MG tablet     carBAMazepine (EPITOL) 200 MG tablet     Cyanocobalamin (B-12) 1000 MCG CAPS     eszopiclone (LUNESTA) 2 MG tablet     melatonin 1 MG CAPS     metoprolol succinate ER (TOPROL XL) 25 MG 24 hr tablet     metroNIDAZOLE (METROCREAM) 0.75 % external cream     nitroGLYcerin (NITROSTAT) 0.4 MG sublingual tablet     simvastatin (ZOCOR) 40 MG tablet     valACYclovir (VALTREX) 1000 mg tablet     Current Facility-Administered Medications   Medication     lidocaine 1% with EPINEPHrine 1:100,000 injection 0.5 mL            Family History:     Family History   Problem Relation Age of Onset     Cancer Father         Stomach     Heart Disease Mother      Heart Disease Maternal Grandmother      Skin Cancer No family hx of      Melanoma No family hx of             Social History:     Social History     Socioeconomic History     Marital status:      Spouse name: Not on file     Number of children: Not on file     Years of education: Not on file     Highest education level: Not on file   Occupational History     Not on file   Tobacco Use     Smoking status: Former     Packs/day: 0.50     Years: 1.00     Pack years: 0.50     Types: Cigarettes     Start date: 1965     Quit date: 1970     Years since quittin.5     Smokeless tobacco: Never   Substance  and Sexual Activity     Alcohol use: Yes     Comment: rarely - a beer a month     Drug use: No     Sexual activity: Yes     Partners: Female     Birth control/protection: Male Surgical   Other Topics Concern     Parent/sibling w/ CABG, MI or angioplasty before 65F 55M? Not Asked   Social History Narrative     Not on file     Social Determinants of Health     Financial Resource Strain: Not on file   Food Insecurity: Not on file   Transportation Needs: Not on file   Physical Activity: Not on file   Stress: Not on file   Social Connections: Not on file   Intimate Partner Violence: Not on file   Housing Stability: Not on file            Allergies:   Macrodantin [nitrofurantoin], Sulfa drugs, and Tamsulosin      24 minutes spent on the date of the encounter doing chart review, patient visit and documentation

## 2022-10-25 NOTE — NURSING NOTE
"Chief Complaint   Patient presents with     Benign Prostatic Hypertrophy       Blood pressure 136/65, pulse 78, height 1.778 m (5' 10\"), weight 69.9 kg (154 lb). Body mass index is 22.1 kg/m .    Patient Active Problem List   Diagnosis     CAD S/P percutaneous coronary angioplasty     CAD (coronary artery disease)     BPH (benign prostatic hyperplasia)     Hyperlipidemia with target LDL less than 70     S/P coronary artery stent placement     GERD (gastroesophageal reflux disease)     S/P TURP     Dermatitis     AK (actinic keratosis)     SCC (squamous cell carcinoma)     SK (seborrheic keratosis)     EIC (epidermal inclusion cyst)     History of squamous cell carcinoma     Seborrheic keratosis     Inflamed seborrheic keratosis     History of dysplastic nevus     Varicose veins     Medication reaction, initial encounter     HSV (herpes simplex virus) infection     Hypertension     Insomnia     Thrombocytopenia (H)     Benign prostatic hyperplasia with urinary obstruction     Hypertrophy of prostate without urinary obstruction and other lower urinary tract symptoms (LUTS)     Atherosclerosis of coronary artery     Recurrent coronary arteriosclerosis after percutaneous transluminal coronary angioplasty     History of atypical nevus     Hyperlipidemia     History of transurethral resection of prostate     Presence of coronary angioplasty implant and graft     Squamous cell carcinoma of skin     Varicose veins of other specified sites       Allergies   Allergen Reactions     Macrodantin [Nitrofurantoin] Rash     Sulfa Drugs Rash     Tamsulosin Rash       Current Outpatient Medications   Medication Sig Dispense Refill     alfuzosin ER (UROXATRAL) 10 MG 24 hr tablet Take 1 tablet (10 mg) by mouth daily 90 tablet 3     aspirin 81 MG tablet Take 81 mg by mouth daily       carBAMazepine (EPITOL) 200 MG tablet Take 1 tablet (200 mg) by mouth 2 times daily as needed (for trigeminal neuralgia) 30 tablet 3     Cyanocobalamin " (B-12) 1000 MCG CAPS Take 1 capsule by mouth daily        eszopiclone (LUNESTA) 2 MG tablet Take 1 tablet (2 mg) by mouth nightly as needed 15 tablet 5     melatonin 1 MG CAPS        metoprolol succinate ER (TOPROL XL) 25 MG 24 hr tablet TAKE 1/2 (ONE-HALF) TABLET BY MOUTH ONCE DAILY 45 tablet 3     metroNIDAZOLE (METROCREAM) 0.75 % external cream Apply topically 2 times daily 45 g 11     nitroGLYcerin (NITROSTAT) 0.4 MG sublingual tablet Place 1 tablet (0.4 mg) under the tongue every 5 minutes as needed for chest pain *DO NOT EXCEED A TOTAL OF 3 DOSES IN 15 MINUTES. 15 tablet 3     simvastatin (ZOCOR) 40 MG tablet Take 1 tablet (40 mg) by mouth At Bedtime 90 tablet 3     valACYclovir (VALTREX) 1000 mg tablet Take 1 pill twice a day for 3 days at the first sign of a cold core. 6 tablet 3       Social History     Tobacco Use     Smoking status: Former     Packs/day: 0.50     Years: 1.00     Pack years: 0.50     Types: Cigarettes     Start date: 1965     Quit date: 1970     Years since quittin.5     Smokeless tobacco: Never   Substance Use Topics     Alcohol use: Yes     Comment: rarely - a beer a month     Drug use: No       Jeanne Guo  10/25/2022  9:57 AM

## 2022-10-25 NOTE — LETTER
10/25/2022       RE: Jacinto Flannery  [address]  [place] [address]     Dear Colleague,    Thank you for referring your patient, Jacinto Flannery, to the [facility]. Please see a copy of my visit note below.        UROLOGY OFFICE VISIT FOLLOW-UP NOTE           Chief Complaint:   Annual follow up          Assessment/Plan   82 year old male with history of urinary retention s/p PVP in 2014, doing well with minimal symptoms. He is not having any bothersome LUTS, UTIs, or urinary incontinence. Recent kidney function within normal limits. He is happy to continue with the same regimen of alfuzosin 10 mg once daily. We also discussed PSA screening and that this is no longer recommended after age 75. He agrees.  -Continue alfuzosin 10 mg once daily. Renewed.   -Follow up in 1 year, sooner with any issues.    Gill Melendez PA-C  Department of Urology         Interval Update   Jacinto Flannery is a very pleasant 83 yo M with hx of urinary retention.  He underwent PVP with Dr. Porter in 1/2015. Saw Dr. Pritchett on 9/22/2020. History from this date as follows:    He has had some residual symptoms and has managed these conservatively over the years.  He is not bothered currently. He has had some elevated PVRs in the past.  At one point underwent VUDS but there was difficulty getting the cather in.  One year ago had a PVR of 30 and a cystoscopy around that time showed minimal regrowth with an open bladder neck.    Today notes: He is very pleased with the way he voids.  Denies slow stream, hesitancy, UTI or hematuria.  He takes alfuzosin.    On that date, he was recommended to continue alfuzosin and follow up for PVR as needed.    I then saw him back on 10/26/21:   He feels to be voiding well. Has nocturia 1 time per night and no daytime symptoms. He double voids about once per day to ensure complete bladder emptying. Occasionally measures his  "urine output when he double/triple voids with average voided volumes as follows: first void: 300 mL, second void: 100 mL, third void: 50 mL. He denies any urinary incontinence or UTIs. He had a PVR in the nurse clinic on 11/2/2020 which was very low at 10 mL. Recent kidney function a few weeks ago was within normal limits.     TODAY   10/25/22:  Doing well. He has no concerns related to his urinary habits. Feels to be voiding well. No UTIs in recent history. Continues to take alfuzosin 10 mg once daily.     Physical Exam:    /65   Pulse 78   Ht 1.778 m (5' 10\")   Wt 69.9 kg (154 lb)   BMI 22.10 kg/m    GENERAL: Healthy, alert and no distress  EYES: Eyes grossly normal to inspection.  No discharge or erythema, or obvious scleral/conjunctival abnormalities.  RESP: No audible wheeze, cough, or visible cyanosis.  No visible retractions or increased work of breathing.    SKIN: Visible skin clear. No significant rash, abnormal pigmentation or lesions.  NEURO: Cranial nerves grossly intact.  Mentation and speech appropriate for age.  PSYCH: Mentation appears normal, affect normal/bright, judgement and insight intact, normal speech and appearance well-groomed.      Labs and Pathology:      Creatinine   Date Value Ref Range Status   10/13/2022 0.86 0.67 - 1.17 mg/dL Final   12/22/2020 0.89 0.66 - 1.25 mg/dL Final       No results found for: PSA         Past Medical History:     Past Medical History:   Diagnosis Date     BPH (benign prostatic hyperplasia)      CAD (coronary artery disease)     MI 1989, CABG 1990, 11/2014 PCI to LM, LCx and RCA     Conductive hearing loss Unknown.     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     Skin disease      Squamous cell carcinoma             Past Surgical History:     Past Surgical History:   Procedure Laterality Date     CHOLECYSTECTOMY  1992    in [place]     ENDOSCOPY  04/26/2013    Done at [facility] in [place], [place]     GENITOURINARY SURGERY       HEART CATH STENT COR W/WO PTCA  11/12/2014    x 4 ANKUR's (two ostial to mid-RCA, one ostial LCx and mid-LM, one ostial LAD)     HERNIA REPAIR Right 2010    in [place]     LASER KTP GREEN LIGHT PHOTOSELECTIVE VAPORIZATION PROSTATE N/A 1/5/2015    Procedure: LASER KTP GREEN LIGHT PHOTOSELECTIVE VAPORIZATION PROSTATE;  Surgeon: Natalie Porter MD;  Location: [facility] OR     MOHS MICROGRAPHIC PROCEDURE       Z CABG, VEIN, THREE  1990    SVG-LAD in [place]            Medications     Current Outpatient Medications   Medication     alfuzosin ER (UROXATRAL) 10 MG 24 hr tablet     aspirin 81 MG tablet     carBAMazepine (EPITOL) 200 MG tablet     Cyanocobalamin (B-12) 1000 MCG CAPS     eszopiclone (LUNESTA) 2 MG tablet     melatonin 1 MG CAPS     metoprolol succinate ER (TOPROL XL) 25 MG 24 hr tablet     metroNIDAZOLE (METROCREAM) 0.75 % external cream     nitroGLYcerin (NITROSTAT) 0.4 MG sublingual tablet     simvastatin (ZOCOR) 40 MG tablet     valACYclovir (VALTREX) 1000 mg tablet     Current Facility-Administered Medications   Medication     lidocaine 1% with EPINEPHrine 1:100,000 injection 0.5 mL            Family History:     Family History   Problem Relation Age of Onset     Cancer Father         Stomach     Heart Disease Mother      Heart Disease Maternal Grandmother      Skin Cancer No family hx of      Melanoma No family hx of             Social History:     Social History     Socioeconomic History     Marital status:      Spouse name: Not on file     Number of children: Not on file     Years of education: Not on file     Highest education  level: Not on file   Occupational History     Not on file   Tobacco Use     Smoking status: Former     Packs/day: 0.50     Years: 1.00     Pack years: 0.50     Types: Cigarettes     Start date: 1965     Quit date: 1970     Years since quittin.5     Smokeless tobacco: Never   Substance and Sexual Activity     Alcohol use: Yes     Comment: rarely - a beer a month     Drug use: No     Sexual activity: Yes     Partners: Female     Birth control/protection: Male Surgical   Other Topics Concern     Parent/sibling w/ CABG, MI or angioplasty before 65F 55M? Not Asked   Social History Narrative     Not on file     Social Determinants of Health     Financial Resource Strain: Not on file   Food Insecurity: Not on file   Transportation Needs: Not on file   Physical Activity: Not on file   Stress: Not on file   Social Connections: Not on file   Intimate Partner Violence: Not on file   Housing Stability: Not on file            Allergies:   Macrodantin [nitrofurantoin], Sulfa drugs, and Tamsulosin      24 minutes spent on the date of the encounter doing chart review, patient visit and documentation

## 2022-12-29 NOTE — PROGRESS NOTES
General Cardiology Clinic-[facility]      HPI: Mr. Jacinto Flannery is a 82 year old  male with PMH significant for    -Coronary artery disease, status post bypass surgery with a single SVG sequential graft to LAD and diagonal in 1990; PCI of RCA, left main, and left circumflex in November 2014.  Most recent angiogram 4/1/2016 when he presented with recurrent chest pain:  Left main: Stented from the ostium into proximal left circumflex stent widely patent  LAD: Small to medium in size  Left circumflex: Small and nondominant, widely patent stent present, diffuse 50% after the stent  RCA: Large dominant system, widely patent stent from the ostium to the mid segment  SVG sequential graft to the LAD and D1: Widely patent  -Squamous cell carcinoma skin  - Hypertension    Patient was last seen in cardiology clinic by my colleague Dr. Akins.  He reported doing well at that time  Patient is being seen today for 2-year follow-up.     He tells me that he has been doing well over the 2 years.  He is physically very active.  Denies exertional chest pain.  Feels mildly short of breath when he climbs incline and reaches the top.  Shortness of breath goes away quick.  Does not feel short of breath with any other physical activity.  Does not feel palpitations, no dizziness, syncope or lower extremity edema.    He does not smoke.  Reports well-controlled blood pressure at home.  Compliant with medications.    Medications:   Aspirin 81 mg daily  Metoprolol succinate 12.5 mg daily  Simvastatin 40 mg nightly    Medications, personal, family, and social history reviewed with patient and revised.    PAST MEDICAL HISTORY:  Past Medical History:   Diagnosis Date     BPH (benign prostatic hyperplasia)      CAD (coronary artery disease)     MI 1989, CABG 1990, 11/2014 PCI to LM, LCx and RCA     Conductive hearing loss Unknown.     GERD (gastroesophageal reflux disease)      Hearing problem Unknown     HSV-2 (herpes simplex virus 2) infection  09/2014     Hyperlipidaemia LDL goal < 70      Hypertension      Insomnia      NSTEMI (non-ST elevated myocardial infarction) (H) 11/15/2014    Type 4a     Reduced vision Mid-adult years    Mother, father     S/P coronary artery stent placement 11/12/14    x 4 ANKUR's     Sensorineural hearing loss Unknown.     Skin disease      Squamous cell carcinoma        CURRENT MEDICATIONS:  Current Outpatient Medications   Medication Sig Dispense Refill     alfuzosin ER (UROXATRAL) 10 MG 24 hr tablet Take 1 tablet (10 mg) by mouth daily 90 tablet 3     aspirin 81 MG tablet Take 81 mg by mouth daily       carBAMazepine (EPITOL) 200 MG tablet Take 1 tablet (200 mg) by mouth 2 times daily as needed (for trigeminal neuralgia) 30 tablet 3     eszopiclone (LUNESTA) 2 MG tablet Take 1 tablet (2 mg) by mouth nightly as needed 15 tablet 5     melatonin 1 MG CAPS        metoprolol succinate ER (TOPROL XL) 25 MG 24 hr tablet TAKE 1/2 (ONE-HALF) TABLET BY MOUTH ONCE DAILY 45 tablet 3     metroNIDAZOLE (METROCREAM) 0.75 % external cream Apply topically 2 times daily 45 g 11     nitroGLYcerin (NITROSTAT) 0.4 MG sublingual tablet Place 1 tablet (0.4 mg) under the tongue every 5 minutes as needed for chest pain *DO NOT EXCEED A TOTAL OF 3 DOSES IN 15 MINUTES. 15 tablet 3     simvastatin (ZOCOR) 40 MG tablet Take 1 tablet (40 mg) by mouth At Bedtime 90 tablet 3     valACYclovir (VALTREX) 1000 mg tablet Take 1 pill twice a day for 3 days at the first sign of a cold core. 6 tablet 3       PAST SURGICAL HISTORY:  Past Surgical History:   Procedure Laterality Date     CHOLECYSTECTOMY  1992    in [place]     ENDOSCOPY  04/26/2013    Done at [facility] in [place], [place]     GENITOURINARY SURGERY       HEART CATH STENT COR W/WO PTCA  11/12/2014    x 4 ANKUR's (two ostial to mid-RCA, one ostial LCx and mid-LM, one ostial LAD)     HERNIA REPAIR Right 2010    in [place]     LASER KTP GREEN LIGHT PHOTOSELECTIVE VAPORIZATION PROSTATE N/A 1/5/2015     "Procedure: LASER KTP GREEN LIGHT PHOTOSELECTIVE VAPORIZATION PROSTATE;  Surgeon: Natalie Porter MD;  Location: [facility] OR     MOHS MICROGRAPHIC PROCEDURE       [facility] CABG, VEIN, THREE      SVG-LAD in [place]       ALLERGIES:     Allergies   Allergen Reactions     Macrodantin [Nitrofurantoin] Rash     Sulfa Drugs Rash     Tamsulosin Rash       FAMILY HISTORY:  Family History   Problem Relation Age of Onset     Cancer Father         Stomach     Heart Disease Mother      Heart Disease Maternal Grandmother      Skin Cancer No family hx of      Melanoma No family hx of          SOCIAL HISTORY:  Social History     Tobacco Use     Smoking status: Former     Packs/day: 0.50     Years: 1.00     Pack years: 0.50     Types: Cigarettes     Start date: 1965     Quit date: 1970     Years since quittin.6     Smokeless tobacco: Never   Substance Use Topics     Alcohol use: Yes     Comment: rarely - a beer a month     Drug use: No       ROS:   Constitutional: No fever, chills, or sweats. Weight stable.   Cardiovascular: As per HPI.       Exam:  /69 (BP Location: Right arm, Patient Position: Sitting, Cuff Size: Adult Regular)   Pulse 60   Ht 1.787 m (5' 10.35\")   Wt 73.8 kg (162 lb 12.8 oz)   SpO2 97%   BMI 23.12 kg/m    GENERAL APPEARANCE: alert and no distress  HEENT: no icterus, no central cyanosis  LYMPH/NECK: no adenopathy, no asymmetry, JVP not elevated, no carotid bruits.  RESPIRATORY: lungs clear to auscultation - no rales, rhonchi or wheezes, no use of accessory muscles, no retractions, respirations are unlabored, normal respiratory rate  CARDIOVASCULAR: regular rhythm, normal S1, S2, no S3 or S4 and no murmur, click or rub, precordium quiet with normal PMI.  GI: soft, non tender  EXTREMITIES: no edema  NEURO: alert, normal speech,and affect  SKIN: no ecchymoses, no rashes     I have reviewed the labs and personally reviewed the imaging below and made my comment in the assessment and " plan.    Labs:  CBC RESULTS:   Lab Results   Component Value Date    WBC 5.5 04/01/2016    RBC 4.64 04/01/2016    HGB 14.1 04/01/2016    HCT 41.6 04/01/2016    MCV 90 04/01/2016    MCH 30.4 04/01/2016    MCHC 33.9 04/01/2016    RDW 13.2 04/01/2016     (L) 04/01/2016       BMP RESULTS:  Lab Results   Component Value Date     10/13/2022     12/22/2020    POTASSIUM 4.3 10/13/2022    POTASSIUM 4.2 10/11/2021    POTASSIUM 4.0 12/22/2020    CHLORIDE 104 10/13/2022    CHLORIDE 109 10/11/2021    CHLORIDE 106 12/22/2020    CO2 29 10/13/2022    CO2 28 10/11/2021    CO2 29 12/22/2020    ANIONGAP 8 10/13/2022    ANIONGAP 6 10/11/2021    ANIONGAP 6 12/22/2020    GLC 90 10/13/2022    GLC 92 10/11/2021    GLC 90 12/22/2020    BUN 21.2 10/13/2022    BUN 16 10/11/2021    BUN 15 12/22/2020    CR 0.86 10/13/2022    CR 0.89 12/22/2020    GFRESTIMATED 88 10/13/2022    GFRESTIMATED 82 12/22/2020    GFRESTBLACK >90 12/22/2020    JAMIE 9.6 10/13/2022    JAMIE 9.2 12/22/2020            Echocardiogram 2014  The Ejection Fraction is estimated at 55-60%. Cannot exclude basal inferior   hypokinesis. No pericardial effusion is present.  Global right ventricular function is normal.  Mild to moderate right ventricular dilation is present.    EKG 1/19/2021 shows sinus rhythm with nonsustained SVT          Assessment and Plan:     1. CAD s/p CABG.   -Physically very active.  No concerning cardiac symptoms at this time.  Recommend to continue current treatment.      2. Hypertension.  -Normal blood pressure in clinic and at home.      3. PACs and nonsustained SVT (during my physical exam today I was able to detect a few nonsustained SVT by pulse exam.  He was completely asymptomatic)  - Asymptomatic. No specific treatment needed.     Plan:  Continue all current meds with no change.   Recommend to continue his current physical activity.    Follow-up in cardiology in 2 years or earlier as needed    Total time spent today for this visit is  40 minutes including precharting, face-to-face clinic visit, review of labs/imaging and medical documentation.    Please donot hesitate to contact me if you have any questions or concerns. Again, thank you for allowing me to participate in the care of your patient.    Diann CALZADA MD  [facility] Division of Cardiology  Pager [phone]

## 2023-01-02 ENCOUNTER — OFFICE VISIT (OUTPATIENT)
Dept: CARDIOLOGY | Facility: CLINIC | Age: 83
End: 2023-01-02
Attending: INTERNAL MEDICINE
Payer: MEDICARE

## 2023-01-02 ENCOUNTER — MEDICAL CORRESPONDENCE (OUTPATIENT)
Dept: HEALTH INFORMATION MANAGEMENT | Facility: CLINIC | Age: 83
End: 2023-01-02

## 2023-01-02 VITALS
WEIGHT: 162.8 LBS | HEART RATE: 60 BPM | OXYGEN SATURATION: 97 % | DIASTOLIC BLOOD PRESSURE: 69 MMHG | BODY MASS INDEX: 23.31 KG/M2 | HEIGHT: 70 IN | SYSTOLIC BLOOD PRESSURE: 125 MMHG

## 2023-01-02 DIAGNOSIS — I10 HYPERTENSION, WELL CONTROLLED: ICD-10-CM

## 2023-01-02 DIAGNOSIS — I49.1 PREMATURE ATRIAL CONTRACTIONS: ICD-10-CM

## 2023-01-02 DIAGNOSIS — I25.10 CORONARY ARTERY DISEASE INVOLVING NATIVE CORONARY ARTERY OF NATIVE HEART WITHOUT ANGINA PECTORIS: Primary | ICD-10-CM

## 2023-01-02 DIAGNOSIS — N40.1 BENIGN NON-NODULAR PROSTATIC HYPERPLASIA WITH LOWER URINARY TRACT SYMPTOMS: ICD-10-CM

## 2023-01-02 PROCEDURE — G0463 HOSPITAL OUTPT CLINIC VISIT: HCPCS | Performed by: INTERNAL MEDICINE

## 2023-01-02 PROCEDURE — 99215 OFFICE O/P EST HI 40 MIN: CPT | Performed by: INTERNAL MEDICINE

## 2023-01-02 PROCEDURE — G0463 HOSPITAL OUTPT CLINIC VISIT: HCPCS

## 2023-01-02 RX ORDER — ALFUZOSIN HYDROCHLORIDE 10 MG/1
1 TABLET, EXTENDED RELEASE ORAL DAILY
Qty: 90 TABLET | Refills: 3 | Status: SHIPPED | OUTPATIENT
Start: 2023-01-02 | End: 2023-11-28

## 2023-01-02 ASSESSMENT — PAIN SCALES - GENERAL: PAINLEVEL: NO PAIN (0)

## 2023-01-02 NOTE — PROGRESS NOTES
Pt arrived to clinic to drop of a note for Gill Melendez PA-C. Note states that his medicare part D required him to switch pharmacies to [facility] and that he needs a new Rx sent to the new pharmacy on file. Verified in chart that Rx for Alfuzosin ER 10 mg was current and that pt had been seen within a year. Rx reordered to new pharmacy per policy.     Sheeba Corey CMA  01/02/23  11:22 AM

## 2023-01-02 NOTE — NURSING NOTE
Chief Complaint   Patient presents with     Follow Up     return provider change          Vitals were taken and medications reconciled.     Hamilton Ortiz, EMT   11:37 AM

## 2023-01-02 NOTE — LETTER
1/2/2023      RE: Jacinto Flannery  [address]  [facility] [address]       Dear Colleague,    Thank you for the opportunity to participate in the care of your patient, Jacinto Flannery, at the [facility] HEART [facility] at [facility]. Please see a copy of my visit note below.           General Cardiology Clinic-[facility] [place]      HPI: Mr. Jacinto Flannery is a 82 year old  male with PMH significant for    -Coronary artery disease, status post bypass surgery with a single SVG sequential graft to LAD and diagonal in 1990; PCI of RCA, left main, and left circumflex in November 2014.  Most recent angiogram 4/1/2016 when he presented with recurrent chest pain:  Left main: Stented from the ostium into proximal left circumflex stent widely patent  LAD: Small to medium in size  Left circumflex: Small and nondominant, widely patent stent present, diffuse 50% after the stent  RCA: Large dominant system, widely patent stent from the ostium to the mid segment  SVG sequential graft to the LAD and D1: Widely patent  -Squamous cell carcinoma skin  - Hypertension    Patient was last seen in cardiology clinic by my colleague Dr. Akins.  He reported doing well at that time  Patient is being seen today for 2-year follow-up.     He tells me that he has been doing well over the 2 years.  He is physically very active.  Denies exertional chest pain.  Feels mildly short of breath when he climbs incline and reaches the top.  Shortness of breath goes away quick.  Does not feel short of breath with any other physical activity.  Does not feel palpitations, no dizziness, syncope or lower extremity edema.    He does not smoke.  Reports well-controlled blood pressure at home.  Compliant with medications.    Medications:   Aspirin 81 mg daily  Metoprolol succinate 12.5 mg daily  Simvastatin 40 mg nightly    Medications, personal, family, and social history reviewed with patient and revised.    PAST MEDICAL  HISTORY:  Past Medical History:   Diagnosis Date     BPH (benign prostatic hyperplasia)      CAD (coronary artery disease)     MI 1989, CABG 1990, 11/2014 PCI to LM, LCx and RCA     Conductive hearing loss Unknown.     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     Skin disease      Squamous cell carcinoma        CURRENT MEDICATIONS:  Current Outpatient Medications   Medication Sig Dispense Refill     alfuzosin ER (UROXATRAL) 10 MG 24 hr tablet Take 1 tablet (10 mg) by mouth daily 90 tablet 3     aspirin 81 MG tablet Take 81 mg by mouth daily       carBAMazepine (EPITOL) 200 MG tablet Take 1 tablet (200 mg) by mouth 2 times daily as needed (for trigeminal neuralgia) 30 tablet 3     eszopiclone (LUNESTA) 2 MG tablet Take 1 tablet (2 mg) by mouth nightly as needed 15 tablet 5     melatonin 1 MG CAPS        metoprolol succinate ER (TOPROL XL) 25 MG 24 hr tablet TAKE 1/2 (ONE-HALF) TABLET BY MOUTH ONCE DAILY 45 tablet 3     metroNIDAZOLE (METROCREAM) 0.75 % external cream Apply topically 2 times daily 45 g 11     nitroGLYcerin (NITROSTAT) 0.4 MG sublingual tablet Place 1 tablet (0.4 mg) under the tongue every 5 minutes as needed for chest pain *DO NOT EXCEED A TOTAL OF 3 DOSES IN 15 MINUTES. 15 tablet 3     simvastatin (ZOCOR) 40 MG tablet Take 1 tablet (40 mg) by mouth At Bedtime 90 tablet 3     valACYclovir (VALTREX) 1000 mg tablet Take 1 pill twice a day for 3 days at the first sign of a cold core. 6 tablet 3       PAST SURGICAL HISTORY:  Past Surgical History:   Procedure Laterality Date     CHOLECYSTECTOMY  1992    in [place]     ENDOSCOPY   "2013    Done at [facility] in [place], [place]     GENITOURINARY SURGERY       HEART CATH STENT COR W/WO PTCA  11/12/2014    x 4 ANKUR's (two ostial to mid-RCA, one ostial LCx and mid-LM, one ostial LAD)     HERNIA REPAIR Right     in [place]     LASER KTP GREEN LIGHT PHOTOSELECTIVE VAPORIZATION PROSTATE N/A 2015    Procedure: LASER KTP GREEN LIGHT PHOTOSELECTIVE VAPORIZATION PROSTATE;  Surgeon: Natalie Porter MD;  Location: [facility] OR     MOHS MICROGRAPHIC PROCEDURE       ZC CABG, VEIN, THREE      SVG-LAD in [place]       ALLERGIES:     Allergies   Allergen Reactions     Macrodantin [Nitrofurantoin] Rash     Sulfa Drugs Rash     Tamsulosin Rash       FAMILY HISTORY:  Family History   Problem Relation Age of Onset     Cancer Father         Stomach     Heart Disease Mother      Heart Disease Maternal Grandmother      Skin Cancer No family hx of      Melanoma No family hx of          SOCIAL HISTORY:  Social History     Tobacco Use     Smoking status: Former     Packs/day: 0.50     Years: 1.00     Pack years: 0.50     Types: Cigarettes     Start date: 1965     Quit date: 1970     Years since quittin.6     Smokeless tobacco: Never   Substance Use Topics     Alcohol use: Yes     Comment: rarely - a beer a month     Drug use: No       ROS:   Constitutional: No fever, chills, or sweats. Weight stable.   Cardiovascular: As per HPI.       Exam:  /69 (BP Location: Right arm, Patient Position: Sitting, Cuff Size: Adult Regular)   Pulse 60   Ht 1.787 m (5' 10.35\")   Wt 73.8 kg (162 lb 12.8 oz)   SpO2 97%   BMI 23.12 kg/m    GENERAL APPEARANCE: alert and no distress  HEENT: no icterus, no central cyanosis  LYMPH/NECK: no adenopathy, no asymmetry, JVP not elevated, no carotid bruits.  RESPIRATORY: lungs clear to auscultation - no rales, rhonchi or wheezes, no use of accessory muscles, no retractions, respirations are unlabored, normal respiratory rate  CARDIOVASCULAR: regular " rhythm, normal S1, S2, no S3 or S4 and no murmur, click or rub, precordium quiet with normal PMI.  GI: soft, non tender  EXTREMITIES: no edema  NEURO: alert, normal speech,and affect  SKIN: no ecchymoses, no rashes     I have reviewed the labs and personally reviewed the imaging below and made my comment in the assessment and plan.    Labs:  CBC RESULTS:   Lab Results   Component Value Date    WBC 5.5 04/01/2016    RBC 4.64 04/01/2016    HGB 14.1 04/01/2016    HCT 41.6 04/01/2016    MCV 90 04/01/2016    MCH 30.4 04/01/2016    MCHC 33.9 04/01/2016    RDW 13.2 04/01/2016     (L) 04/01/2016       BMP RESULTS:  Lab Results   Component Value Date     10/13/2022     12/22/2020    POTASSIUM 4.3 10/13/2022    POTASSIUM 4.2 10/11/2021    POTASSIUM 4.0 12/22/2020    CHLORIDE 104 10/13/2022    CHLORIDE 109 10/11/2021    CHLORIDE 106 12/22/2020    CO2 29 10/13/2022    CO2 28 10/11/2021    CO2 29 12/22/2020    ANIONGAP 8 10/13/2022    ANIONGAP 6 10/11/2021    ANIONGAP 6 12/22/2020    GLC 90 10/13/2022    GLC 92 10/11/2021    GLC 90 12/22/2020    BUN 21.2 10/13/2022    BUN 16 10/11/2021    BUN 15 12/22/2020    CR 0.86 10/13/2022    CR 0.89 12/22/2020    GFRESTIMATED 88 10/13/2022    GFRESTIMATED 82 12/22/2020    GFRESTBLACK >90 12/22/2020    JAMIE 9.6 10/13/2022    JAMIE 9.2 12/22/2020            Echocardiogram 2014  The Ejection Fraction is estimated at 55-60%. Cannot exclude basal inferior   hypokinesis. No pericardial effusion is present.  Global right ventricular function is normal.  Mild to moderate right ventricular dilation is present.    EKG 1/19/2021 shows sinus rhythm with nonsustained SVT          Assessment and Plan:     1. CAD s/p CABG.   -Physically very active.  No concerning cardiac symptoms at this time.  Recommend to continue current treatment.      2. Hypertension.  -Normal blood pressure in clinic and at home.      3. PACs and nonsustained SVT (during my physical exam today I was able to detect  a few nonsustained SVT by pulse exam.  He was completely asymptomatic)  - Asymptomatic. No specific treatment needed.     Plan:  Continue all current meds with no change.   Recommend to continue his current physical activity.    Follow-up in cardiology in 2 years or earlier as needed    Total time spent today for this visit is 40 minutes including precharting, face-to-face clinic visit, review of labs/imaging and medical documentation.    Please donot hesitate to contact me if you have any questions or concerns. Again, thank you for allowing me to participate in the care of your patient.    Diann CALZADA MD  [facility] Division of Cardiology  Pager [phone]

## 2023-01-19 ENCOUNTER — TELEPHONE (OUTPATIENT)
Dept: INTERNAL MEDICINE | Facility: CLINIC | Age: 83
End: 2023-01-19
Payer: MEDICARE

## 2023-01-19 DIAGNOSIS — I25.10 CORONARY ARTERY DISEASE INVOLVING NATIVE CORONARY ARTERY OF NATIVE HEART WITHOUT ANGINA PECTORIS: ICD-10-CM

## 2023-01-19 DIAGNOSIS — I25.812 CORONARY ARTERY DISEASE INVOLVING BYPASS GRAFT OF TRANSPLANTED HEART WITHOUT ANGINA PECTORIS: ICD-10-CM

## 2023-01-19 RX ORDER — METOPROLOL SUCCINATE 25 MG/1
TABLET, EXTENDED RELEASE ORAL
Qty: 45 TABLET | Refills: 3 | Status: SHIPPED | OUTPATIENT
Start: 2023-01-19 | End: 2024-01-10

## 2023-01-19 RX ORDER — SIMVASTATIN 40 MG
40 TABLET ORAL AT BEDTIME
Qty: 90 TABLET | Refills: 3 | Status: SHIPPED | OUTPATIENT
Start: 2023-01-19 | End: 2024-01-10

## 2023-01-19 NOTE — TELEPHONE ENCOUNTER
RN called patient and let him know that Zocor and metoprolol were sent to the [facility] in [place].  RN also called [facility] and cancelled these Rxs, since patient's insurance has changed and he needs to use [facility].    Marcela Handy RN on 1/19/2023 at 2:28 PM

## 2023-01-19 NOTE — TELEPHONE ENCOUNTER
[facility] Call Center    Phone Message    May a detailed message be left on voicemail: yes     Reason for Call: Other: Patient is wanting a call back to talk about a medication that regarding a envalope on 02/17 around 9am. He says it is rather important and is needing a call ASAP       Action Taken: Message routed to:  [facility] ([facility] pcp    Travel Screening: Not Applicable

## 2023-04-12 ENCOUNTER — OFFICE VISIT (OUTPATIENT)
Dept: DERMATOLOGY | Facility: CLINIC | Age: 83
End: 2023-04-12
Payer: MEDICARE

## 2023-04-12 DIAGNOSIS — L81.4 LENTIGO: ICD-10-CM

## 2023-04-12 DIAGNOSIS — Z85.828 HISTORY OF NONMELANOMA SKIN CANCER: ICD-10-CM

## 2023-04-12 DIAGNOSIS — D22.9 MULTIPLE NEVI: ICD-10-CM

## 2023-04-12 DIAGNOSIS — Z12.83 SKIN EXAM FOR MALIGNANT NEOPLASM: Primary | ICD-10-CM

## 2023-04-12 DIAGNOSIS — D18.01 CHERRY ANGIOMA: ICD-10-CM

## 2023-04-12 DIAGNOSIS — L82.1 SEBORRHEIC KERATOSES: ICD-10-CM

## 2023-04-12 DIAGNOSIS — L57.0 ACTINIC KERATOSIS: ICD-10-CM

## 2023-04-12 PROCEDURE — 17003 DESTRUCT PREMALG LES 2-14: CPT | Mod: GC | Performed by: DERMATOLOGY

## 2023-04-12 PROCEDURE — 17000 DESTRUCT PREMALG LESION: CPT | Mod: GC | Performed by: DERMATOLOGY

## 2023-04-12 PROCEDURE — 99213 OFFICE O/P EST LOW 20 MIN: CPT | Mod: 25 | Performed by: DERMATOLOGY

## 2023-04-12 ASSESSMENT — PAIN SCALES - GENERAL: PAINLEVEL: NO PAIN (0)

## 2023-04-12 NOTE — PROGRESS NOTES
[facility] Dermatology Note   Encounter Date: Apr 12, 2023  Office visit    Dermatology Problem List:    Last FBSE: 4/12/23    #  NMSC  - SCC, left (dorsal) forearm, s/p excision 8/18/2015  - SCC, right dorsal hand, s/p Mohs 3/18/2015  # Compound nevus with moderate dysplasia, mid upper back   - s/p biopsy 10/16/2015  # Actinic keratosis    - s/p cryotherapy 07/20/2016, 03/06/19  - efudex in November 2016 (used for 19 days), pt had a very robust response  - pt given hydrocortisone 2.5% cream BID PRN due to severe response and told to stop efudex  # Unknown skin eruption penis. Resolved 7/5/17  - s/p triamcinolone cream, nystatin cream, mupirocin, and valtrex  - s/p biopsy showing hemorraghic scale crust and ulceration 2/3/2015  # Drug exanthem, 2/2 to tamsulosin  - s/p biopsy showing interface dermatitis 12/29/2014  - s/p lidex initiated 1/8/2015  - s/p clobetasol initiated 12/29/2014-improved  # Traumatic tattoo  - Central forehead, measured 1X1.5cm on 10/16/17  # Rosacea  - Current Tx: Metronidazole cream    ___________________________________________    Assessment & Plan:    # AK - forehead (x1) and L cheek (x2)  Discussed the possible etiologies, clinical course, and management options of this condition with the patient. Will perform cryotherapy on the areas above  - See procedure note below    # Multiple clinically banal appearing nevi  # Lentigo  Discussed the possible etiologies, clinical course, and management options of this benign condition with the patient.  - Reviewed the ABCDEs of melanoma  - Recommend daily sunscreen use with SPF at least 30    # Seborrheic keratoses  # Cherry angiomata  Discussed the possible etiologies, clinical course, and management options of this benign condition with the patient.  - Reassurance provided    # H/o NMSC  No evidence of recurrence of disease  - We will continue to monitor    Procedures Performed:  Cryotherapy procedure note  - location:  forehead  (x1) and L cheek (x2)  - number of lesions treated: 3  After verbal consent and discussion of risks and benefits including but no limited to dyspigmentation/scar, blister, and pain, lesions treated with 1-2mm freeze border for 2 cycles with liquid nitrogen, post cryotherapy instructions provided    Follow-up: 6m    Staff Involved:  Patient was seen and staffed with attending physician Dr. Robina Rouse MD  Med/Derm Resident PGY-5  P:[phone]    Staff Addendum:  I was present for the entire procedure. Anjelica Quarles MD  I, Anjelica Quarles MD, saw this patient with the resident and agree with the resident s findings and plan of care as documented in the resident s note.  ___________________________________________      CC: Skin Check (No areas of concern)      HPI:  Mr. Jacinto Flannery is a(n) 82 year old male who presents to clinic today for FBSE. Reports:  - notes a couple rough spots on his face  - denies any pain or bleeding  - notes a couple other itchy spots on his flanks and back  - otherwise feeling well in usual state of health    Physical exam:  General: in no acute distress, well-developed, well-nourished  Skin:  - skin type: fair  - erythematous macules with rough, gritty scale on forehead (x1) and L cheek (x2)  - multiple light brown to tan papules with reassuring pigment network on dermoscopy on trunk and extremities  - light brown macules on sun exposed skin  - tan to light brown waxy stuck on papules and plaques on trunk and extremities  - red or purple papules that columba with diascopy on trunk and extremities  - previous SCC scars on the L forearm and R dorsal hand without nodularity or pigment changes  - No other lesions of concern on areas examined.     Medications:  Current Outpatient Medications   Medication     alfuzosin ER (UROXATRAL) 10 MG 24 hr tablet     aspirin 81 MG tablet     carBAMazepine (EPITOL) 200 MG tablet     eszopiclone (LUNESTA) 2 MG tablet     melatonin  1 MG CAPS     metoprolol succinate ER (TOPROL XL) 25 MG 24 hr tablet     metroNIDAZOLE (METROCREAM) 0.75 % external cream     nitroGLYcerin (NITROSTAT) 0.4 MG sublingual tablet     simvastatin (ZOCOR) 40 MG tablet     valACYclovir (VALTREX) 1000 mg tablet     Current Facility-Administered Medications   Medication     lidocaine 1% with EPINEPHrine 1:100,000 injection 0.5 mL      Past Medical History:   Patient Active Problem List   Diagnosis     CAD S/P percutaneous coronary angioplasty     CAD (coronary artery disease)     BPH (benign prostatic hyperplasia)     Hyperlipidemia with target LDL less than 70     S/P coronary artery stent placement     GERD (gastroesophageal reflux disease)     S/P TURP     Dermatitis     AK (actinic keratosis)     SCC (squamous cell carcinoma)     SK (seborrheic keratosis)     EIC (epidermal inclusion cyst)     History of squamous cell carcinoma     Seborrheic keratosis     Inflamed seborrheic keratosis     History of dysplastic nevus     Varicose veins     Medication reaction, initial encounter     HSV (herpes simplex virus) infection     Hypertension     Insomnia     Thrombocytopenia (H)     Benign prostatic hyperplasia with urinary obstruction     Hypertrophy of prostate without urinary obstruction and other lower urinary tract symptoms (LUTS)     Atherosclerosis of coronary artery     Recurrent coronary arteriosclerosis after percutaneous transluminal coronary angioplasty     History of atypical nevus     Hyperlipidemia     History of transurethral resection of prostate     Presence of coronary angioplasty implant and graft     Squamous cell carcinoma of skin     Varicose veins of other specified sites     Past Medical History:   Diagnosis Date     BPH (benign prostatic hyperplasia)      CAD (coronary artery disease)     MI 1989, CABG 1990, 11/2014 PCI to LM, LCx and RCA     Conductive hearing loss Unknown.     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     Skin disease      Squamous cell carcinoma        CC Referred Self, MD  No address on file on close of this encounter.

## 2023-04-12 NOTE — NURSING NOTE
Chief Complaint   Patient presents with     Skin Check     No areas of concern     Christian Mera, EMT

## 2023-04-12 NOTE — LETTER
4/12/2023       RE: Jacinto Flannery  [address]  [place] [address]     Dear Colleague,    Thank you for referring your patient, Jacinto Flannery, to the [facility] DERMATOLOGY CLINIC [place] at [facility]. Please see a copy of my visit note below.    [facility] Dermatology Note   Encounter Date: Apr 12, 2023  Office visit    Dermatology Problem List:    Last FBSE: 4/12/23    #  NMSC  - SCC, left (dorsal) forearm, s/p excision 8/18/2015  - SCC, right dorsal hand, s/p Mohs 3/18/2015  # Compound nevus with moderate dysplasia, mid upper back   - s/p biopsy 10/16/2015  # Actinic keratosis    - s/p cryotherapy 07/20/2016, 03/06/19  - efudex in November 2016 (used for 19 days), pt had a very robust response  - pt given hydrocortisone 2.5% cream BID PRN due to severe response and told to stop efudex  # Unknown skin eruption penis. Resolved 7/5/17  - s/p triamcinolone cream, nystatin cream, mupirocin, and valtrex  - s/p biopsy showing hemorraghic scale crust and ulceration 2/3/2015  # Drug exanthem, 2/2 to tamsulosin  - s/p biopsy showing interface dermatitis 12/29/2014  - s/p lidex initiated 1/8/2015  - s/p clobetasol initiated 12/29/2014-improved  # Traumatic tattoo  - Central forehead, measured 1X1.5cm on 10/16/17  # Rosacea  - Current Tx: Metronidazole cream    ___________________________________________    Assessment & Plan:    # AK - forehead (x1) and L cheek (x2)  Discussed the possible etiologies, clinical course, and management options of this condition with the patient. Will perform cryotherapy on the areas above  - See procedure note below    # Multiple clinically banal appearing nevi  # Lentigo  Discussed the possible etiologies, clinical course, and management options of this benign condition with the patient.  - Reviewed the ABCDEs of melanoma  - Recommend daily sunscreen use with SPF at least 30    # Seborrheic keratoses  # Cherry  angiomata  Discussed the possible etiologies, clinical course, and management options of this benign condition with the patient.  - Reassurance provided    # H/o NMSC  No evidence of recurrence of disease  - We will continue to monitor    Procedures Performed:  Cryotherapy procedure note  - location:  forehead (x1) and L cheek (x2)  - number of lesions treated: 3  After verbal consent and discussion of risks and benefits including but no limited to dyspigmentation/scar, blister, and pain, lesions treated with 1-2mm freeze border for 2 cycles with liquid nitrogen, post cryotherapy instructions provided    Follow-up: 6m    Staff Involved:  Patient was seen and staffed with attending physician Dr. Robina Rouse MD  Med/Derm Resident PGY-5  P:[phone]    Staff Addendum:  I was present for the entire procedure. Anjelica Quarles MD  I, Anjelica Quarles MD, saw this patient with the resident and agree with the resident s findings and plan of care as documented in the resident s note.  ___________________________________________      CC: Skin Check (No areas of concern)      HPI:  Mr. Jacinto Flannery is a(n) 82 year old male who presents to clinic today for FBSE. Reports:  - notes a couple rough spots on his face  - denies any pain or bleeding  - notes a couple other itchy spots on his flanks and back  - otherwise feeling well in usual state of health    Physical exam:  General: in no acute distress, well-developed, well-nourished  Skin:  - skin type: fair  - erythematous macules with rough, gritty scale on forehead (x1) and L cheek (x2)  - multiple light brown to tan papules with reassuring pigment network on dermoscopy on trunk and extremities  - light brown macules on sun exposed skin  - tan to light brown waxy stuck on papules and plaques on trunk and extremities  - red or purple papules that columba with diascopy on trunk and extremities  - previous SCC scars on the L forearm and R dorsal hand  without nodularity or pigment changes  - No other lesions of concern on areas examined.     Medications:  Current Outpatient Medications   Medication    alfuzosin ER (UROXATRAL) 10 MG 24 hr tablet    aspirin 81 MG tablet    carBAMazepine (EPITOL) 200 MG tablet    eszopiclone (LUNESTA) 2 MG tablet    melatonin 1 MG CAPS    metoprolol succinate ER (TOPROL XL) 25 MG 24 hr tablet    metroNIDAZOLE (METROCREAM) 0.75 % external cream    nitroGLYcerin (NITROSTAT) 0.4 MG sublingual tablet    simvastatin (ZOCOR) 40 MG tablet    valACYclovir (VALTREX) 1000 mg tablet     Current Facility-Administered Medications   Medication    lidocaine 1% with EPINEPHrine 1:100,000 injection 0.5 mL      Past Medical History:   Patient Active Problem List   Diagnosis    CAD S/P percutaneous coronary angioplasty    CAD (coronary artery disease)    BPH (benign prostatic hyperplasia)    Hyperlipidemia with target LDL less than 70    S/P coronary artery stent placement    GERD (gastroesophageal reflux disease)    S/P TURP    Dermatitis    AK (actinic keratosis)    SCC (squamous cell carcinoma)    SK (seborrheic keratosis)    EIC (epidermal inclusion cyst)    History of squamous cell carcinoma    Seborrheic keratosis    Inflamed seborrheic keratosis    History of dysplastic nevus    Varicose veins    Medication reaction, initial encounter    HSV (herpes simplex virus) infection    Hypertension    Insomnia    Thrombocytopenia (H)    Benign prostatic hyperplasia with urinary obstruction    Hypertrophy of prostate without urinary obstruction and other lower urinary tract symptoms (LUTS)    Atherosclerosis of coronary artery    Recurrent coronary arteriosclerosis after percutaneous transluminal coronary angioplasty    History of atypical nevus    Hyperlipidemia    History of transurethral resection of prostate    Presence of coronary angioplasty implant and graft    Squamous cell carcinoma of skin    Varicose veins of other specified sites     Past  Medical History:   Diagnosis Date    BPH (benign prostatic hyperplasia)     CAD (coronary artery disease)     MI 1989, CABG 1990, 11/2014 PCI to LM, LCx and RCA    Conductive hearing loss Unknown.    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    Skin disease     Squamous cell carcinoma      CC Referred Self, MD  No address on file on close of this encounter.

## 2023-04-12 NOTE — PATIENT INSTRUCTIONS
Cryotherapy    What is it?  Use of a very cold liquid, such as liquid nitrogen, to freeze and destroy abnormal skin cells that need to be removed    What should I expect?  Tenderness and redness  A small blister that might grow and fill with dark purple blood. There may be crusting.  More than one treatment may be needed if the lesions do not go away.    How do I care for the treated area?  Gently wash the area with your hands when bathing.  Use a thin layer of Vaseline to help with healing. You may use a Band-Aid.   The area should heal within 7-10 days and may leave behind a pink or lighter color.   Do not use an antibiotic or Neosporin ointment.   You may take acetaminophen (Tylenol) for pain.     Call your doctor if you have:  Severe pain  Signs of infection (warmth, redness, cloudy yellow drainage, and or a bad smell)  Questions or concerns    Who should I call with questions?      [facility]: [phone]      [facility]: [phone]      For urgent needs outside of business hours call the [facility] at [phone] and ask for the dermatology resident on call

## 2023-10-09 ENCOUNTER — PRE VISIT (OUTPATIENT)
Dept: UROLOGY | Facility: CLINIC | Age: 83
End: 2023-10-09
Payer: MEDICARE

## 2023-10-18 ENCOUNTER — LAB (OUTPATIENT)
Dept: LAB | Facility: CLINIC | Age: 83
End: 2023-10-18
Payer: MEDICARE

## 2023-10-18 DIAGNOSIS — E78.5 HYPERLIPIDEMIA WITH TARGET LDL LESS THAN 70: ICD-10-CM

## 2023-10-18 LAB
ALBUMIN SERPL BCG-MCNC: 4.3 G/DL (ref 3.5–5.2)
ALP SERPL-CCNC: 61 U/L (ref 40–129)
ALT SERPL W P-5'-P-CCNC: 13 U/L (ref 0–70)
ANION GAP SERPL CALCULATED.3IONS-SCNC: 8 MMOL/L (ref 7–15)
AST SERPL W P-5'-P-CCNC: 25 U/L (ref 0–45)
BILIRUB SERPL-MCNC: 0.7 MG/DL
BUN SERPL-MCNC: 18.8 MG/DL (ref 8–23)
CALCIUM SERPL-MCNC: 9.3 MG/DL (ref 8.8–10.2)
CHLORIDE SERPL-SCNC: 104 MMOL/L (ref 98–107)
CHOLEST SERPL-MCNC: 146 MG/DL
CREAT SERPL-MCNC: 0.86 MG/DL (ref 0.67–1.17)
DEPRECATED HCO3 PLAS-SCNC: 28 MMOL/L (ref 22–29)
EGFRCR SERPLBLD CKD-EPI 2021: 87 ML/MIN/1.73M2
GLUCOSE SERPL-MCNC: 97 MG/DL (ref 70–99)
HDLC SERPL-MCNC: 54 MG/DL
LDLC SERPL CALC-MCNC: 74 MG/DL
NONHDLC SERPL-MCNC: 92 MG/DL
POTASSIUM SERPL-SCNC: 4.3 MMOL/L (ref 3.4–5.3)
PROT SERPL-MCNC: 6.6 G/DL (ref 6.4–8.3)
SODIUM SERPL-SCNC: 140 MMOL/L (ref 135–145)
TRIGL SERPL-MCNC: 92 MG/DL

## 2023-10-18 PROCEDURE — 80061 LIPID PANEL: CPT | Performed by: PATHOLOGY

## 2023-10-18 PROCEDURE — 80053 COMPREHEN METABOLIC PANEL: CPT | Performed by: PATHOLOGY

## 2023-10-18 PROCEDURE — 36415 COLL VENOUS BLD VENIPUNCTURE: CPT | Performed by: PATHOLOGY

## 2023-10-23 ENCOUNTER — TELEPHONE (OUTPATIENT)
Dept: UROLOGY | Facility: CLINIC | Age: 83
End: 2023-10-23
Payer: MEDICARE

## 2023-10-24 ENCOUNTER — VIRTUAL VISIT (OUTPATIENT)
Dept: UROLOGY | Facility: CLINIC | Age: 83
End: 2023-10-24
Payer: MEDICARE

## 2023-10-24 ENCOUNTER — OFFICE VISIT (OUTPATIENT)
Dept: DERMATOLOGY | Facility: CLINIC | Age: 83
End: 2023-10-24
Payer: MEDICARE

## 2023-10-24 DIAGNOSIS — L71.9 ROSACEA: ICD-10-CM

## 2023-10-24 DIAGNOSIS — Z85.828 HISTORY OF NONMELANOMA SKIN CANCER: Primary | ICD-10-CM

## 2023-10-24 DIAGNOSIS — D22.9 MULTIPLE NEVI: ICD-10-CM

## 2023-10-24 DIAGNOSIS — N40.1 BENIGN NON-NODULAR PROSTATIC HYPERPLASIA WITH LOWER URINARY TRACT SYMPTOMS: Primary | ICD-10-CM

## 2023-10-24 DIAGNOSIS — L82.1 SK (SEBORRHEIC KERATOSIS): ICD-10-CM

## 2023-10-24 PROCEDURE — 99213 OFFICE O/P EST LOW 20 MIN: CPT | Performed by: DERMATOLOGY

## 2023-10-24 PROCEDURE — 99213 OFFICE O/P EST LOW 20 MIN: CPT | Mod: 95 | Performed by: PHYSICIAN ASSISTANT

## 2023-10-24 ASSESSMENT — PAIN SCALES - GENERAL: PAINLEVEL: NO PAIN (0)

## 2023-10-24 NOTE — PROGRESS NOTES
[facility] Dermatology Note  Encounter Date: Oct 24, 2023  Office Visit     Dermatology Problem List:  Last FBSE: 10/24/23      #  NMSC  - SCC, left (dorsal) forearm, s/p excision 8/18/2015  - SCC, right dorsal hand, s/p Mohs 3/18/2015  # Compound nevus with moderate dysplasia, mid upper back   - s/p biopsy 10/16/2015  # Actinic keratosis    - s/p cryotherapy 07/20/2016, 03/06/19, 04/12/2023  - efudex in November 2016 (used for 19 days), pt had a very robust response  - pt given hydrocortisone 2.5% cream BID PRN due to severe response and told to stop efudex  # Unknown skin eruption penis. Resolved 7/5/17  - s/p triamcinolone cream, nystatin cream, mupirocin, and valtrex  - s/p biopsy showing hemorraghic scale crust and ulceration 2/3/2015  # Drug exanthem, 2/2 to tamsulosin  - s/p biopsy showing interface dermatitis 12/29/2014  - s/p lidex initiated 1/8/2015  - s/p clobetasol initiated 12/29/2014-improved  # Traumatic tattoo  - Central forehead, measured 1X1.5cm on 10/16/17  # Rosacea  - Current Tx: Metronidazole cream    ____________________________________________    Assessment & Plan:   # SK - forehead (x1) previously treated with cryotherapy 4/12/23  Discussed the possible etiologies, clinical course, and management options of this condition with the patient. Opted against cryotherapy given benign appearance.       # Multiple clinically banal appearing nevi  # Lentigo  Discussed the possible etiologies, clinical course, and management options of this benign condition with the patient.  - Recommend daily sunscreen use with SPF at least 30     # Seborrheic keratoses  # Cherry angiomata  Discussed the possible etiologies, clinical course, and management options of this benign condition with the patient.  - Reassurance provided    #Rosacea  - Patient can continue to use Metronidazole prn     # H/o NMSC  No evidence of recurrence of disease  - We will continue to monitor    Procedures Performed:  "  None      Follow-up: 6 month(s) in-person, or earlier for new or changing lesions    Staff: Dr. Robina Jimenez, MS3  Medical Student  I was present with the medical student who participated in the service and in the documentation of the note. I have verified the history and personally performed the physical exam and medical decision making. I agree with the assessment and plan of care as documented in the note.  Anjelica Quarles MD   ____________________________________________    CC: Skin Check (6 month skin check/Area of concern: R forehead)    HPI:  Mr. Jacinto Flannery is a(n) 83 year old male who presents today as a return patient for a skin check. Lesion on forehead was treated with cryotherapy on 2023. Patient is concerned the lesion has persisted, feels \"harder\" upon touch and not diminished in size. Denies pain, bleeding and pruritus at the site. Also has a question about  Metronidazole cream. Has not had recent rosacea flares and rarely uses the medication.    Patient is otherwise feeling well, without additional skin concerns.    Labs Reviewed:  N/A    Physical Exam:  Vitals: There were no vitals taken for this visit.  SKIN: Total skin exam was performed. The exam included the head/face, neck, both arms, chest, back, abdomen, both legs, digits and/or nails.   - waxy stuck on papule right forehead with a pedunculated tag protruding.  No atypia on dermoscopy.  Not tender or friable.   - multiple light brown to tan papules with reassuring pigment network on dermoscopy on trunk and extremities  - light brown macules on sun exposed skin  - tan to light brown waxy stuck on papules and plaques on trunk and extremities  - red or purple papules that columba with diascopy on trunk and extremities  - previous SCC scars on the L forearm and R dorsal hand without nodularity or pigment changes  - No other lesions of concern on areas examined.     Medications:  Current Outpatient Medications "   Medication    alfuzosin ER (UROXATRAL) 10 MG 24 hr tablet    aspirin 81 MG tablet    carBAMazepine (EPITOL) 200 MG tablet    eszopiclone (LUNESTA) 2 MG tablet    melatonin 1 MG CAPS    metoprolol succinate ER (TOPROL XL) 25 MG 24 hr tablet    metroNIDAZOLE (METROCREAM) 0.75 % external cream    nitroGLYcerin (NITROSTAT) 0.4 MG sublingual tablet    simvastatin (ZOCOR) 40 MG tablet    valACYclovir (VALTREX) 1000 mg tablet     Current Facility-Administered Medications   Medication    lidocaine 1% with EPINEPHrine 1:100,000 injection 0.5 mL      Past Medical History:   Patient Active Problem List   Diagnosis    CAD S/P percutaneous coronary angioplasty    CAD (coronary artery disease)    BPH (benign prostatic hyperplasia)    Hyperlipidemia with target LDL less than 70    S/P coronary artery stent placement    GERD (gastroesophageal reflux disease)    S/P TURP    Dermatitis    AK (actinic keratosis)    SCC (squamous cell carcinoma)    SK (seborrheic keratosis)    EIC (epidermal inclusion cyst)    History of squamous cell carcinoma    Seborrheic keratosis    Inflamed seborrheic keratosis    History of dysplastic nevus    Varicose veins    Medication reaction, initial encounter    HSV (herpes simplex virus) infection    Hypertension    Insomnia    Thrombocytopenia (H24)    Benign prostatic hyperplasia with urinary obstruction    Hypertrophy of prostate without urinary obstruction and other lower urinary tract symptoms (LUTS)    Atherosclerosis of coronary artery    Recurrent coronary arteriosclerosis after percutaneous transluminal coronary angioplasty    History of atypical nevus    Hyperlipidemia    History of transurethral resection of prostate    Presence of coronary angioplasty implant and graft    Squamous cell carcinoma of skin    Varicose veins of other specified sites     Past Medical History:   Diagnosis Date    BPH (benign prostatic hyperplasia)     CAD (coronary artery disease)     MI 1989, CABG 1990, 11/2014  PCI to LM, LCx and RCA    Conductive hearing loss Unknown.    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    Skin disease     Squamous cell carcinoma        CC Referred Self, MD  No address on file on close of this encounter.

## 2023-10-24 NOTE — PATIENT INSTRUCTIONS
UROLOGY CLINIC VISIT PATIENT INSTRUCTIONS    Schedule a lab visit at any [facility] location for a PSA blood test.    Continue alfuzosin 10 mg daily. Let us know when you need a refill and what pharmacy to use.    If you have any issues, questions or concerns in the meantime, do not hesitate to contact us at [phone] or via [employer].     It was a pleasure meeting with you today.  Thank you for allowing me and my team the privilege of caring for you today.  YOU are the reason we are here, and I truly hope we provided you with the excellent service you deserve.  Please let us know if there is anything else we can do for you so that we can be sure you are leaving completely satisfied with your care experience.

## 2023-10-24 NOTE — LETTER
10/24/2023       RE: Jacinto Flannery  [address]  [place] [id number]     Dear Colleague,    Thank you for referring your patient, Jacinto Flannery, to the [facility] DERMATOLOGY CLINIC [place] at [facility]. Please see a copy of my visit note below.    [facility] Dermatology Note  Encounter Date: Oct 24, 2023  Office Visit     Dermatology Problem List:  Last FBSE: 10/24/23      #  NMSC  - SCC, left (dorsal) forearm, s/p excision 8/18/2015  - SCC, right dorsal hand, s/p Mohs 3/18/2015  # Compound nevus with moderate dysplasia, mid upper back   - s/p biopsy 10/16/2015  # Actinic keratosis    - s/p cryotherapy 07/20/2016, 03/06/19, 04/12/2023  - efudex in November 2016 (used for 19 days), pt had a very robust response  - pt given hydrocortisone 2.5% cream BID PRN due to severe response and told to stop efudex  # Unknown skin eruption penis. Resolved 7/5/17  - s/p triamcinolone cream, nystatin cream, mupirocin, and valtrex  - s/p biopsy showing hemorraghic scale crust and ulceration 2/3/2015  # Drug exanthem, 2/2 to tamsulosin  - s/p biopsy showing interface dermatitis 12/29/2014  - s/p lidex initiated 1/8/2015  - s/p clobetasol initiated 12/29/2014-improved  # Traumatic tattoo  - Central forehead, measured 1X1.5cm on 10/16/17  # Rosacea  - Current Tx: Metronidazole cream    ____________________________________________    Assessment & Plan:   # SK - forehead (x1) previously treated with cryotherapy 4/12/23  Discussed the possible etiologies, clinical course, and management options of this condition with the patient. Opted against cryotherapy given benign appearance.       # Multiple clinically banal appearing nevi  # Lentigo  Discussed the possible etiologies, clinical course, and management options of this benign condition with the patient.  - Recommend daily sunscreen use with SPF at least 30     # Seborrheic keratoses  # Cherry  "angiomata  Discussed the possible etiologies, clinical course, and management options of this benign condition with the patient.  - Reassurance provided    #Rosacea  - Patient can continue to use Metronidazole prn     # H/o NMSC  No evidence of recurrence of disease  - We will continue to monitor    Procedures Performed:   None      Follow-up: 6 month(s) in-person, or earlier for new or changing lesions    Staff: Dr. Robina Jimenez, MS3  Medical Student  I was present with the medical student who participated in the service and in the documentation of the note. I have verified the history and personally performed the physical exam and medical decision making. I agree with the assessment and plan of care as documented in the note.  Anjelica Quarles MD   ____________________________________________    CC: Skin Check (6 month skin check/Area of concern: R forehead)    HPI:  Mr. Jacinto Flannery is a(n) 83 year old male who presents today as a return patient for a skin check. Lesion on forehead was treated with cryotherapy on 2023. Patient is concerned the lesion has persisted, feels \"harder\" upon touch and not diminished in size. Denies pain, bleeding and pruritus at the site. Also has a question about  Metronidazole cream. Has not had recent rosacea flares and rarely uses the medication.    Patient is otherwise feeling well, without additional skin concerns.    Labs Reviewed:  N/A    Physical Exam:  Vitals: There were no vitals taken for this visit.  SKIN: Total skin exam was performed. The exam included the head/face, neck, both arms, chest, back, abdomen, both legs, digits and/or nails.   - waxy stuck on papule right forehead with a pedunculated tag protruding.  No atypia on dermoscopy.  Not tender or friable.   - multiple light brown to tan papules with reassuring pigment network on dermoscopy on trunk and extremities  - light brown macules on sun exposed skin  - tan to light brown waxy stuck " on papules and plaques on trunk and extremities  - red or purple papules that columba with diascopy on trunk and extremities  - previous SCC scars on the L forearm and R dorsal hand without nodularity or pigment changes  - No other lesions of concern on areas examined.     Medications:  Current Outpatient Medications   Medication    alfuzosin ER (UROXATRAL) 10 MG 24 hr tablet    aspirin 81 MG tablet    carBAMazepine (EPITOL) 200 MG tablet    eszopiclone (LUNESTA) 2 MG tablet    melatonin 1 MG CAPS    metoprolol succinate ER (TOPROL XL) 25 MG 24 hr tablet    metroNIDAZOLE (METROCREAM) 0.75 % external cream    nitroGLYcerin (NITROSTAT) 0.4 MG sublingual tablet    simvastatin (ZOCOR) 40 MG tablet    valACYclovir (VALTREX) 1000 mg tablet     Current Facility-Administered Medications   Medication    lidocaine 1% with EPINEPHrine 1:100,000 injection 0.5 mL      Past Medical History:   Patient Active Problem List   Diagnosis    CAD S/P percutaneous coronary angioplasty    CAD (coronary artery disease)    BPH (benign prostatic hyperplasia)    Hyperlipidemia with target LDL less than 70    S/P coronary artery stent placement    GERD (gastroesophageal reflux disease)    S/P TURP    Dermatitis    AK (actinic keratosis)    SCC (squamous cell carcinoma)    SK (seborrheic keratosis)    EIC (epidermal inclusion cyst)    History of squamous cell carcinoma    Seborrheic keratosis    Inflamed seborrheic keratosis    History of dysplastic nevus    Varicose veins    Medication reaction, initial encounter    HSV (herpes simplex virus) infection    Hypertension    Insomnia    Thrombocytopenia (H24)    Benign prostatic hyperplasia with urinary obstruction    Hypertrophy of prostate without urinary obstruction and other lower urinary tract symptoms (LUTS)    Atherosclerosis of coronary artery    Recurrent coronary arteriosclerosis after percutaneous transluminal coronary angioplasty    History of atypical nevus    Hyperlipidemia    History  of transurethral resection of prostate    Presence of coronary angioplasty implant and graft    Squamous cell carcinoma of skin    Varicose veins of other specified sites     Past Medical History:   Diagnosis Date    BPH (benign prostatic hyperplasia)     CAD (coronary artery disease)     MI 1989, CABG 1990, 11/2014 PCI to LM, LCx and RCA    Conductive hearing loss Unknown.    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    Skin disease     Squamous cell carcinoma        CC Referred Self, MD  No address on file on close of this encounter.

## 2023-10-24 NOTE — LETTER
10/24/2023       RE: Jacinto Flannery  [address]  [place] [address]     Dear Colleague,    Thank you for referring your patient, Jacinto Flannery, to the [facility] UROLOGY CLINIC [place] at [facility]. Please see a copy of my visit note below.        UROLOGY VIRTUAL VISIT FOLLOW-UP NOTE           Chief Complaint:   Annual follow up          Assessment/Plan   83 year old male with history of urinary retention s/p PVP in 2014, doing well with minimal symptoms. He is not having any bothersome LUTS, UTIs, or urinary incontinence. Recent kidney function within normal limits. He is happy to continue with the same regimen of alfuzosin 10 mg once daily. We also discussed PSA screening in detail. Not technically recommended at his age per guidelines, but given that he is otherwise healthy and active, will check PSA.   -Continue alfuzosin 10 mg once daily. He is changing his drug plan and will notify us when he needs refills and what pharmacy to use.  -PSA next available.   -Follow up in 1 year, sooner with any issues.    Gill Melendez PA-C  Department of Urology         Interval Update   Jacinto Flannery is a very pleasant 82 yo M with hx of urinary retention.  He underwent PVP with Dr. Porter in 1/2015. Saw Dr. Pritchett on 9/22/2020. History from this date as follows:    He has had some residual symptoms and has managed these conservatively over the years.  He is not bothered currently. He has had some elevated PVRs in the past.  At one point underwent VUDS but there was difficulty getting the cather in.  One year ago had a PVR of 30 and a cystoscopy around that time showed minimal regrowth with an open bladder neck.    Today notes: He is very pleased with the way he voids.  Denies slow stream, hesitancy, UTI or hematuria.  He takes alfuzosin.    On that date, he was recommended to continue alfuzosin and follow up for PVR as needed.    I then saw him back  "on 10/26/21:   He feels to be voiding well. Has nocturia 1 time per night and no daytime symptoms. He double voids about once per day to ensure complete bladder emptying. Occasionally measures his urine output when he double/triple voids with average voided volumes as follows: first void: 300 mL, second void: 100 mL, third void: 50 mL. He denies any urinary incontinence or UTIs. He had a PVR in the nurse clinic on 11/2/2020 which was very low at 10 mL. Recent kidney function a few weeks ago was within normal limits.     Last visit 10/25/22:  Doing well. He has no concerns related to his urinary habits. Feels to be voiding well. No UTIs in recent history. Continues to take alfuzosin 10 mg once daily.    TODAY   10/24/2023:  Still voiding well. No concerns. Taking alfuzosin 10 mg daily.  Asks about prostate cancer screening. No recent PSAs on file.  He is otherwise healthy and quite active.  He lost some weight over the summer due to an increase in physical activity. No new night sweats or bone pain.     Physical Exam:    GENERAL: Healthy, alert and no distress  EYES: Eyes grossly normal to inspection.  No discharge or erythema, or obvious scleral/conjunctival abnormalities.  RESP: No audible wheeze, cough, or visible cyanosis.  No visible retractions or increased work of breathing.    SKIN: Visible skin clear. No significant rash, abnormal pigmentation or lesions.  NEURO: Cranial nerves grossly intact.  Mentation and speech appropriate for age.  PSYCH: Mentation appears normal, affect normal/bright, judgement and insight intact, normal speech and appearance well-groomed.      Labs and Pathology:      Creatinine   Date Value Ref Range Status   10/18/2023 0.86 0.67 - 1.17 mg/dL Final   12/22/2020 0.89 0.66 - 1.25 mg/dL Final       No results found for: \"PSA\"         Past Medical History:     Past Medical History:   Diagnosis Date    BPH (benign prostatic hyperplasia)     CAD (coronary artery disease)     MI 1989, CABG " 1990, 11/2014 PCI to LM, LCx and RCA    Conductive hearing loss Unknown.    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    Skin disease     Squamous cell carcinoma             Past Surgical History:     Past Surgical History:   Procedure Laterality Date    CHOLECYSTECTOMY  1992    in [place]    ENDOSCOPY  04/26/2013    Done at [facility] in [place], [place]    GENITOURINARY SURGERY      HEART CATH STENT COR W/WO PTCA  11/12/2014    x 4 ANKUR's (two ostial to mid-RCA, one ostial LCx and mid-LM, one ostial LAD)    HERNIA REPAIR Right 2010    in [place]    LASER KTP GREEN LIGHT PHOTOSELECTIVE VAPORIZATION PROSTATE N/A 1/5/2015    Procedure: LASER KTP GREEN LIGHT PHOTOSELECTIVE VAPORIZATION PROSTATE;  Surgeon: Natalie Porter MD;  Location: [facility] OR    MOHS MICROGRAPHIC PROCEDURE      [facility] CABG, VEIN, THREE  1990    [facility] in [place]            Medications     Current Outpatient Medications   Medication    alfuzosin ER (UROXATRAL) 10 MG 24 hr tablet    aspirin 81 MG tablet    carBAMazepine (EPITOL) 200 MG tablet    eszopiclone (LUNESTA) 2 MG tablet    melatonin 1 MG CAPS    metoprolol succinate ER (TOPROL XL) 25 MG 24 hr tablet    metroNIDAZOLE (METROCREAM) 0.75 % external cream    nitroGLYcerin (NITROSTAT) 0.4 MG sublingual tablet    simvastatin (ZOCOR) 40 MG tablet    valACYclovir (VALTREX) 1000 mg tablet     Current Facility-Administered Medications   Medication    lidocaine 1% with EPINEPHrine 1:100,000 injection 0.5 mL            Family History:     Family History   Problem Relation Age of Onset    Cancer Father         Stomach    Heart Disease  Mother     Heart Disease Maternal Grandmother     Skin Cancer No family hx of     Melanoma No family hx of             Social History:     Social History     Socioeconomic History    Marital status:      Spouse name: Not on file    Number of children: Not on file    Years of education: Not on file    Highest education level: Not on file   Occupational History    Not on file   Tobacco Use    Smoking status: Former     Packs/day: 0.50     Years: 1.00     Additional pack years: 0.00     Total pack years: 0.50     Types: Cigarettes     Start date: 1965     Quit date: 1970     Years since quittin.5    Smokeless tobacco: Never   Substance and Sexual Activity    Alcohol use: Yes     Comment: rarely - a beer a month    Drug use: No    Sexual activity: Yes     Partners: Female     Birth control/protection: Male Surgical   Other Topics Concern    Parent/sibling w/ CABG, MI or angioplasty before 65F 55M? Not Asked   Social History Narrative    Not on file     Social Determinants of Health     Financial Resource Strain: Low Risk  (10/23/2023)    Financial Resource Strain     Within the past 12 months, have you or your family members you live with been unable to get utilities (heat, electricity) when it was really needed?: No   Food Insecurity: Low Risk  (10/23/2023)    Food Insecurity     Within the past 12 months, did you worry that your food would run out before you got money to buy more?: No     Within the past 12 months, did the food you bought just not last and you didn t have money to get more?: No   Transportation Needs: Low Risk  (10/23/2023)    Transportation Needs     Within the past 12 months, has lack of transportation kept you from medical appointments, getting your medicines, non-medical meetings or appointments, work, or from getting things that you need?: No   Physical Activity: Not on file   Stress: Not on file   Social Connections: Not on file   Interpersonal Safety: Not on file   Housing  Stability: Low Risk  (10/23/2023)    Housing Stability     Do you have housing? : Yes     Are you worried about losing your housing?: No            Allergies:   Macrodantin [nitrofurantoin], Sulfa antibiotics, and Tamsulosin      Virtual Visit Details    Type of service:  Video Visit   Video Start Time:  8:57 AM  Video End Time: 9:08 AM    Originating Location (pt. Location): Home    Distant Location (provider location):  Off-site  Platform used for Video Visit: Ahandyhand    20 minutes spent on the date of the encounter doing chart review, review of test results, interpretation of tests, patient visit, and documentation    Sincerely,    Gill Melendez PA-C

## 2023-10-24 NOTE — NURSING NOTE
Dermatology Rooming Note    Jacinto Flannery's goals for this visit include:   Chief Complaint   Patient presents with    Skin Check     6 month skin check  Area of concern: R forehead     Ashley Pedraza LPN

## 2023-10-24 NOTE — PROGRESS NOTES
UROLOGY VIRTUAL VISIT FOLLOW-UP NOTE           Chief Complaint:   Annual follow up          Assessment/Plan   83 year old male with history of urinary retention s/p PVP in 2014, doing well with minimal symptoms. He is not having any bothersome LUTS, UTIs, or urinary incontinence. Recent kidney function within normal limits. He is happy to continue with the same regimen of alfuzosin 10 mg once daily. We also discussed PSA screening in detail. Not technically recommended at his age per guidelines, but given that he is otherwise healthy and active, will check PSA.   -Continue alfuzosin 10 mg once daily. He is changing his drug plan and will notify us when he needs refills and what pharmacy to use.  -PSA next available.   -Follow up in 1 year, sooner with any issues.    Gill Melendez PA-C  Department of Urology         Interval Update   Jacinto Flannery is a very pleasant 84 yo M with hx of urinary retention.  He underwent PVP with Dr. Porter in 1/2015. Saw Dr. Pritchett on 9/22/2020. History from this date as follows:    He has had some residual symptoms and has managed these conservatively over the years.  He is not bothered currently. He has had some elevated PVRs in the past.  At one point underwent VUDS but there was difficulty getting the cather in.  One year ago had a PVR of 30 and a cystoscopy around that time showed minimal regrowth with an open bladder neck.    Today notes: He is very pleased with the way he voids.  Denies slow stream, hesitancy, UTI or hematuria.  He takes alfuzosin.    On that date, he was recommended to continue alfuzosin and follow up for PVR as needed.    I then saw him back on 10/26/21:   He feels to be voiding well. Has nocturia 1 time per night and no daytime symptoms. He double voids about once per day to ensure complete bladder emptying. Occasionally measures his urine output when he double/triple voids with average voided volumes as follows: first void: 300 mL, second void: 100  "mL, third void: 50 mL. He denies any urinary incontinence or UTIs. He had a PVR in the nurse clinic on 11/2/2020 which was very low at 10 mL. Recent kidney function a few weeks ago was within normal limits.     Last visit 10/25/22:  Doing well. He has no concerns related to his urinary habits. Feels to be voiding well. No UTIs in recent history. Continues to take alfuzosin 10 mg once daily.    TODAY   10/24/2023:  Still voiding well. No concerns. Taking alfuzosin 10 mg daily.  Asks about prostate cancer screening. No recent PSAs on file.  He is otherwise healthy and quite active.  He lost some weight over the summer due to an increase in physical activity. No new night sweats or bone pain.     Physical Exam:    GENERAL: Healthy, alert and no distress  EYES: Eyes grossly normal to inspection.  No discharge or erythema, or obvious scleral/conjunctival abnormalities.  RESP: No audible wheeze, cough, or visible cyanosis.  No visible retractions or increased work of breathing.    SKIN: Visible skin clear. No significant rash, abnormal pigmentation or lesions.  NEURO: Cranial nerves grossly intact.  Mentation and speech appropriate for age.  PSYCH: Mentation appears normal, affect normal/bright, judgement and insight intact, normal speech and appearance well-groomed.      Labs and Pathology:      Creatinine   Date Value Ref Range Status   10/18/2023 0.86 0.67 - 1.17 mg/dL Final   12/22/2020 0.89 0.66 - 1.25 mg/dL Final       No results found for: \"PSA\"         Past Medical History:     Past Medical History:   Diagnosis Date    BPH (benign prostatic hyperplasia)     CAD (coronary artery disease)     MI 1989, CABG 1990, 11/2014 PCI to LM, LCx and RCA    Conductive hearing loss Unknown.    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    Skin disease     Squamous cell carcinoma             Past Surgical History:     Past Surgical History:   Procedure Laterality Date    CHOLECYSTECTOMY  1992    in [place]    ENDOSCOPY  04/26/2013    Done at [facility] in [place], [place]    GENITOURINARY SURGERY      HEART CATH STENT COR W/WO PTCA  11/12/2014    x 4 ANKUR's (two ostial to mid-RCA, one ostial LCx and mid-LM, one ostial LAD)    HERNIA REPAIR Right 2010    in [place]    LASER KTP GREEN LIGHT PHOTOSELECTIVE VAPORIZATION PROSTATE N/A 1/5/2015    Procedure: LASER KTP GREEN LIGHT PHOTOSELECTIVE VAPORIZATION PROSTATE;  Surgeon: Natalie Porter MD;  Location: [facility]    MOHS MICROGRAPHIC PROCEDURE      ZC CABG, VEIN, THREE  1990    SVG-LAD in [place]            Medications     Current Outpatient Medications   Medication    alfuzosin ER (UROXATRAL) 10 MG 24 hr tablet    aspirin 81 MG tablet    carBAMazepine (EPITOL) 200 MG tablet    eszopiclone (LUNESTA) 2 MG tablet    melatonin 1 MG CAPS    metoprolol succinate ER (TOPROL XL) 25 MG 24 hr tablet    metroNIDAZOLE (METROCREAM) 0.75 % external cream    nitroGLYcerin (NITROSTAT) 0.4 MG sublingual tablet    simvastatin (ZOCOR) 40 MG tablet    valACYclovir (VALTREX) 1000 mg tablet     Current Facility-Administered Medications   Medication    lidocaine 1% with EPINEPHrine 1:100,000 injection 0.5 mL            Family History:     Family History   Problem Relation Age of Onset    Cancer Father         Stomach    Heart Disease Mother     Heart Disease Maternal Grandmother     Skin Cancer No family hx of     Melanoma No family hx of             Social History:     Social History     Socioeconomic History    Marital status:      Spouse name: Not on file    Number of children: Not on file    Years of education: Not on file     Highest education level: Not on file   Occupational History    Not on file   Tobacco Use    Smoking status: Former     Packs/day: 0.50     Years: 1.00     Additional pack years: 0.00     Total pack years: 0.50     Types: Cigarettes     Start date: 1965     Quit date: 1970     Years since quittin.5    Smokeless tobacco: Never   Substance and Sexual Activity    Alcohol use: Yes     Comment: rarely - a beer a month    Drug use: No    Sexual activity: Yes     Partners: Female     Birth control/protection: Male Surgical   Other Topics Concern    Parent/sibling w/ CABG, MI or angioplasty before 65F 55M? Not Asked   Social History Narrative    Not on file     Social Determinants of Health     Financial Resource Strain: Low Risk  (10/23/2023)    Financial Resource Strain     Within the past 12 months, have you or your family members you live with been unable to get utilities (heat, electricity) when it was really needed?: No   Food Insecurity: Low Risk  (10/23/2023)    Food Insecurity     Within the past 12 months, did you worry that your food would run out before you got money to buy more?: No     Within the past 12 months, did the food you bought just not last and you didn t have money to get more?: No   Transportation Needs: Low Risk  (10/23/2023)    Transportation Needs     Within the past 12 months, has lack of transportation kept you from medical appointments, getting your medicines, non-medical meetings or appointments, work, or from getting things that you need?: No   Physical Activity: Not on file   Stress: Not on file   Social Connections: Not on file   Interpersonal Safety: Not on file   Housing Stability: Low Risk  (10/23/2023)    Housing Stability     Do you have housing? : Yes     Are you worried about losing your housing?: No            Allergies:   Macrodantin [nitrofurantoin], Sulfa antibiotics, and Tamsulosin      Virtual Visit Details    Type of service:  Video Visit   Video Start Time:  8:57  AM  Video End Time: 9:08 AM    Originating Location (pt. Location): Home    Distant Location (provider location):  Off-site  Platform [facility] for Video Visit[facility]    20 minutes spent on the date of the encounter doing chart review, review of test results, interpretation of tests, patient visit, and documentation

## 2023-10-24 NOTE — NURSING NOTE
Is the patient currently in the state of [place]? YES    Visit mode:VIDEO    If the visit is dropped, the patient can be reconnected by: VIDEO VISIT: Text to cell phone:   Telephone Information:   Mobile [phone]    and VIDEO VISIT: Send to e-mail at: [email or web address]    Will anyone else be joining the visit? NO  (If patient encounters technical issues they should call [phone])    How would you like to obtain your AVS? MyChart    Are changes needed to the allergy or medication list? No    Reason for visit: SHILPI IZAGUIRRE

## 2023-10-25 ENCOUNTER — LAB (OUTPATIENT)
Dept: LAB | Facility: CLINIC | Age: 83
End: 2023-10-25
Payer: MEDICARE

## 2023-10-25 ENCOUNTER — OFFICE VISIT (OUTPATIENT)
Dept: INTERNAL MEDICINE | Facility: CLINIC | Age: 83
End: 2023-10-25
Payer: MEDICARE

## 2023-10-25 VITALS
HEART RATE: 65 BPM | OXYGEN SATURATION: 98 % | RESPIRATION RATE: 12 BRPM | SYSTOLIC BLOOD PRESSURE: 121 MMHG | DIASTOLIC BLOOD PRESSURE: 80 MMHG | HEIGHT: 70 IN | TEMPERATURE: 98 F | WEIGHT: 149.5 LBS | BODY MASS INDEX: 21.4 KG/M2

## 2023-10-25 DIAGNOSIS — N40.1 BENIGN NON-NODULAR PROSTATIC HYPERPLASIA WITH LOWER URINARY TRACT SYMPTOMS: ICD-10-CM

## 2023-10-25 DIAGNOSIS — Z29.11 NEED FOR VACCINATION AGAINST RESPIRATORY SYNCYTIAL VIRUS: ICD-10-CM

## 2023-10-25 DIAGNOSIS — G50.0 TRIGEMINAL NEURALGIA: Primary | ICD-10-CM

## 2023-10-25 DIAGNOSIS — G47.00 INSOMNIA, UNSPECIFIED TYPE: ICD-10-CM

## 2023-10-25 DIAGNOSIS — I21.4 NSTEMI (NON-ST ELEVATED MYOCARDIAL INFARCTION) (H): ICD-10-CM

## 2023-10-25 DIAGNOSIS — Z00.00 ROUTINE GENERAL MEDICAL EXAMINATION AT A HEALTH CARE FACILITY: ICD-10-CM

## 2023-10-25 LAB — PSA SERPL DL<=0.01 NG/ML-MCNC: 2.22 NG/ML

## 2023-10-25 PROCEDURE — 99214 OFFICE O/P EST MOD 30 MIN: CPT | Mod: GC | Performed by: INTERNAL MEDICINE

## 2023-10-25 PROCEDURE — 36415 COLL VENOUS BLD VENIPUNCTURE: CPT | Performed by: PATHOLOGY

## 2023-10-25 PROCEDURE — 84153 ASSAY OF PSA TOTAL: CPT | Performed by: PATHOLOGY

## 2023-10-25 RX ORDER — CARBAMAZEPINE 200 MG/1
200 CAPSULE, EXTENDED RELEASE ORAL 2 TIMES DAILY PRN
Qty: 30 CAPSULE | Refills: 3 | Status: SHIPPED | OUTPATIENT
Start: 2023-10-25

## 2023-10-25 RX ORDER — RESPIRATORY SYNCYTIAL VIRUS VACCINE 120MCG/0.5
0.5 KIT INTRAMUSCULAR ONCE
Qty: 1 EACH | Refills: 0 | Status: CANCELLED | OUTPATIENT
Start: 2023-10-25 | End: 2023-10-25

## 2023-10-25 RX ORDER — NITROGLYCERIN 0.4 MG/1
0.4 TABLET SUBLINGUAL EVERY 5 MIN PRN
Qty: 30 TABLET | Refills: 1 | Status: SHIPPED | OUTPATIENT
Start: 2023-10-25

## 2023-10-25 RX ORDER — ESZOPICLONE 2 MG/1
2 TABLET, FILM COATED ORAL
Qty: 15 TABLET | Refills: 1 | Status: SHIPPED | OUTPATIENT
Start: 2023-10-25

## 2023-10-25 ASSESSMENT — PAIN SCALES - GENERAL: PAINLEVEL: NO PAIN (0)

## 2023-10-25 NOTE — PROGRESS NOTES
Medicare Annual Wellness Questionnaire:  This 83 year old year old male presents for a Medicare Wellness Exam.        Fall Risk Assessment:    Have you fallen 2 or more times in the last year?  No    How many times were you injured due to a fall in the last year?  0    PHQ-2:    Over the last 2 weeks, how often have you been bothered by feeling down, depressed, or hopeless?     Not at all (0)     Over the last 2 weeks, how often have you had little interest or pleasure in doing things?     Not at all (0)     Social History:    What is your marital status?      Who lives in your household?  Spouse    Does your home have loose rugs in the hallway:     No    Does your home have grab bars in the bathroom:    No    Does your home have handrails on the stairs?  Yes     Does your home have poorly lit areas?    No    Do you feel threatened or controlled by a partner, ex-partner or anyone in your life?   No    Has anyone hurt you physically, for example by pushing, hitting, slapping or kicking you   or forcing you to have sex?   No    Do you need help with the phone, transportation, shopping, preparing meals, housework, laundry, medications or managing money?   No    Sexual Health:    Are you sexually active?    Yes     If yes, with men, women, or both?  Women    If yes, how many partners?  1    If yes, are you using condoms?    No    Have you had any sexually transmitted infections in the last year?   No    Do you have any sexual concerns?    No  \  General Health Assessment:    Have you noticed any hearing difficulties?   Yes     Do you wear hearing aids?   Yes     Have you seen a hearing professional such as an audiologist in the last 1 year?   Yes     Do you have vision difficulty?    Yes     Do you wear glasses or contacts?   Yes     Have you seen an eye doctor in the last 1 year?   Yes     How many servings of fruits and vegetables do you eat a day?  3    How often do you exercise in a week?  5    How long  and what kind of exercise do you do?  Brisk walk 45 minutes    Tobacco and Alcohol History:    Do you use tobacco/nicotine products?    No    If yes, please list the method of use and average weekly consumption?  N/A    Do you use any other drugs?   No         Do you drink alcohol?   No    If you drink alcohol, how many drinks per week?  N/A      Advance Directive:    Have you completed an Advance Directives document?  Yes     If yes, have you given a copy to the clinic?   Yes     Do you need information on Advance Directives?   No

## 2023-10-25 NOTE — PROGRESS NOTES
Sammy is a 83 year old that presents in clinic today for the following:     Chief Complaint   Patient presents with    Wellness Visit     Patient comes in for a wellness exam.           10/25/2023    10:53 AM   Additional Questions   Roomed by Presley Vee   Accompanied by Self       Screenings from encounters over the past 10 days    No data recorded       Presley Vee at 10:56 AM on 10/25/2023

## 2023-10-25 NOTE — PROGRESS NOTES
"Jacinto Flannery is a pleasant 83 year old year old male coming in today for annual physical.            /80   Pulse 65   Temp 98  F (36.7  C) (Oral)   Resp 12   Ht 1.787 m (5' 10.35\")   Wt 67.8 kg (149 lb 8 oz)   SpO2 98%   BMI 21.24 kg/m      PHYSICAL EXAM    Constitutional: no distress, comfortable, pleasant   Eyes: anicteric, normal extra-ocular movements   Ears, Nose and Throat: tympanic membranes clear, neck supple with full range of motion, no thyromegaly.   Cardiovascular: regular rate and rhythm, normal S1 and S2, no murmurs, rubs or gallops, peripheral pulses full and symmetric   Respiratory: clear to auscultation, no wheezes or crackles, normal breath sounds   Gastrointestinal: positive bowel sounds, nontender, no hepatosplenomegaly, no masses   Musculoskeletal: knees full range of motion, no effusion  Skin: no concerning lesions, no jaundice   Neurological:  normal gait, no tremor   Psychological: appropriate mood   Lymphatic: no cervical lymphadenopathy and no pedal edema      ASSESSMENT & PLAN    Sammy was seen today for wellness visit.    Diagnoses and all orders for this visit:    Need for vaccination against respiratory syncytial virus          Scribe Disclosure:   I, Liam York, am serving as a scribe; to document services personally performed by Dr. Nadine Orozco- -based on data collection and the provider's statements to me.     Provider Disclosure:  I agree with above History, Review of Systems, Physical exam and Plan.  I have reviewed the content of the documentation and have edited it as needed. I have personally performed the services documented here and the documentation accurately represents those services and the decisions I have made.      Electronically signed by:  Dr. Nadine Orozco    "

## 2023-10-25 NOTE — NURSING NOTE
Chief Complaint   Patient presents with    Wellness Visit     Patient comes in for a wellness exam.         Presley Vee on 10/25/2023 at 10:55 AM

## 2023-10-25 NOTE — PROGRESS NOTES
"  PRIMARY CARE CENTER      Jacinto Flannery is a 83 year old  male , with concerns including:  Chief Complaint   Patient presents with    Wellness Visit     Patient comes in for a wellness exam.     PCP: Nadine Alonso     Subjective     No concerns regarding BPH, no urinary retention. Feels like the alfuzosin is working well for him. Saw Cardiology in January and was told to come back in two years. Is planning to move to [place] full time this year. Has previously been spending half of the [place] cabin. 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 Feels like his mobility is good. Occasional lightheadedness with standing, but knows this and takes his time getting up. Fell about 4d ago and lightly hit his head and scraped his arm. He fell because he was going around his bed and tripped. No constipation or diarrhea. Eating well. Sleeping well, gets about 7hours/night straight through. Has an appointment with the eye doctor in November. Saw the dentist one week ago.    Objective     /80   Pulse 65   Temp 98  F (36.7  C) (Oral)   Resp 12   Ht 1.787 m (5' 10.35\")   Wt 67.8 kg (149 lb 8 oz)   SpO2 98%   BMI 21.24 kg/m      Physical Exam  Constitutional:       General: He is not in acute distress.  Eyes:      Extraocular Movements: Extraocular movements intact.   Cardiovascular:      Rate and Rhythm: Normal rate and regular rhythm.   Pulmonary:      Effort: Pulmonary effort is normal.      Breath sounds: No wheezing or rales.   Abdominal:      General: Bowel sounds are normal. There is no distension.      Palpations: Abdomen is soft.   Neurological:      General: No focal deficit present.      Mental Status: He is alert and oriented to person, place, and time.   Psychiatric:         Mood and Affect: Mood normal.         Behavior: Behavior normal.        Assessment & Plan   Jacinto Flannery is a 83 year old male with PMH of BPH, NSTEMI, CAD, HTN, and HLD who presents " to clinic for annual follow-up.    Preventative Health  -Discussed getting COVID, influenza, and RSV vaccine this fall. Patient prefers to defer this today, but will get at outside pharmacy  -Refilled the following medications: Carbamezapine, Nitroglycerin, and Lunesta   -Discussed continuing to exercise and increasing protein in diet. Goal weight above 150lbs  -Follow-up in one year, sooner if concerns arise    Health Maintenance letter sent. Marlin Mejia at 11:20 AM on 12/26/2014.  No Health Maintenance letter sent.Fransisca Acevedo LPN 2:52 PM on 12/21/2015      Patient was seen and plan of care was discussed with Dr. Nadine KULKARNI MD   Internal Medicine  [facility]   Clinic phone [phone]

## 2023-10-30 ENCOUNTER — TRANSFERRED RECORDS (OUTPATIENT)
Dept: HEALTH INFORMATION MANAGEMENT | Facility: CLINIC | Age: 83
End: 2023-10-30
Payer: MEDICARE

## 2023-11-28 DIAGNOSIS — N40.1 BENIGN NON-NODULAR PROSTATIC HYPERPLASIA WITH LOWER URINARY TRACT SYMPTOMS: ICD-10-CM

## 2023-11-28 RX ORDER — ALFUZOSIN HYDROCHLORIDE 10 MG/1
1 TABLET, EXTENDED RELEASE ORAL DAILY
Qty: 30 TABLET | Refills: 0 | Status: SHIPPED | OUTPATIENT
Start: 2023-11-28 | End: 2024-01-11

## 2024-01-02 ENCOUNTER — TRANSFERRED RECORDS (OUTPATIENT)
Dept: HEALTH INFORMATION MANAGEMENT | Facility: CLINIC | Age: 84
End: 2024-01-02
Payer: MEDICARE

## 2024-01-10 ENCOUNTER — MYC MEDICAL ADVICE (OUTPATIENT)
Dept: INTERNAL MEDICINE | Facility: CLINIC | Age: 84
End: 2024-01-10
Payer: MEDICARE

## 2024-01-10 DIAGNOSIS — I25.812 CORONARY ARTERY DISEASE INVOLVING BYPASS GRAFT OF TRANSPLANTED HEART WITHOUT ANGINA PECTORIS: ICD-10-CM

## 2024-01-10 DIAGNOSIS — I25.10 CORONARY ARTERY DISEASE INVOLVING NATIVE CORONARY ARTERY OF NATIVE HEART WITHOUT ANGINA PECTORIS: ICD-10-CM

## 2024-01-10 DIAGNOSIS — N40.1 BENIGN NON-NODULAR PROSTATIC HYPERPLASIA WITH LOWER URINARY TRACT SYMPTOMS: ICD-10-CM

## 2024-01-10 RX ORDER — SIMVASTATIN 40 MG
40 TABLET ORAL AT BEDTIME
Qty: 90 TABLET | Refills: 3 | Status: SHIPPED | OUTPATIENT
Start: 2024-01-10

## 2024-01-10 RX ORDER — METOPROLOL SUCCINATE 25 MG/1
TABLET, EXTENDED RELEASE ORAL
Qty: 45 TABLET | Refills: 3 | Status: SHIPPED | OUTPATIENT
Start: 2024-01-10

## 2024-01-10 NOTE — TELEPHONE ENCOUNTER
simvastatin (ZOCOR) 40 MG tablet 90 tablet 3 1/19/2023     metoprolol succinate ER (TOPROL XL) 25 MG 24 hr tablet 45 tablet 3 1/19/2023   Last visit 10/25/23 Dr Aponte [facility] [place]  CMP and LDL done 10/18/23    BP Readings from Last 3 Encounters:   10/25/23 121/80   01/02/23 125/69   10/25/22 136/65    Refilled per protocol.

## 2024-01-16 RX ORDER — ALFUZOSIN HYDROCHLORIDE 10 MG/1
1 TABLET, EXTENDED RELEASE ORAL DAILY
Qty: 30 TABLET | Refills: 11 | Status: SHIPPED | OUTPATIENT
Start: 2024-01-16

## 2024-01-16 NOTE — TELEPHONE ENCOUNTER
alfuzosin ER (UROXATRAL) 10 MG   Last Written Prescription Date:  11/28/23  Last Fill Quantity: 30,   # refills: 0  Last Office Visit : 10/24/23  Future Office visit:  10/22/24  Routing refill request to provider for review/approval because:  Last refill # 30    Is pt continuing med ?

## 2024-03-05 ENCOUNTER — OFFICE VISIT (OUTPATIENT)
Dept: INTERNAL MEDICINE | Facility: CLINIC | Age: 84
End: 2024-03-05
Payer: MEDICARE

## 2024-03-05 VITALS
HEART RATE: 75 BPM | BODY MASS INDEX: 22.41 KG/M2 | WEIGHT: 156.5 LBS | DIASTOLIC BLOOD PRESSURE: 68 MMHG | HEIGHT: 70 IN | OXYGEN SATURATION: 99 % | SYSTOLIC BLOOD PRESSURE: 109 MMHG

## 2024-03-05 DIAGNOSIS — R58 MULTIPLE ECCHYMOSES OF THIGH: Primary | ICD-10-CM

## 2024-03-05 PROCEDURE — 99213 OFFICE O/P EST LOW 20 MIN: CPT | Mod: GE

## 2024-03-05 NOTE — PROGRESS NOTES
PRIMARY [facility]ARE [facility]         HPI:       Jacinto Flannery is a 83 year old male with PMH of CAD, hypertension and hyperlipidemia who presents for a bruise assessment. Three weeks ago he was hiking in [facility] in [place]. Tripped on his left foot, that slid, and so he landed with his right glute. Since then, has pain and soreness in the area. Has noticed a bulge that is painful when pressure is applied. Denies having any difficulty walking. Denies any numbness. Denies waking up at night with pain. Has taken 500mg of tylenol once, no other pain med tried. He is taking aspirin but no anticoagulatns. Denies injuring his head. Monitors his BP at home with excellent readings. Denies fevers, chills or night sweats, denies dyspnea, denies chest pain.Answers submitted by the patient for this visit:           Review of Systems:     ROS  I have personally reviewed and updated the complete ROS on the day of the visit.      General Questionnaire (Submitted on 3/4/2024)  Chief Complaint: Chronic problems general questions HPI Form  How many servings of fruits and vegetables do you eat daily?: 2-3  On average, how many sweetened beverages do you drink each day (Examples: soda, juice, sweet tea, etc.  Do NOT count diet or artificially sweetened beverages)?: 0  How many minutes a day do you exercise enough to make your heart beat faster?: 30 to 60  How many days a week do you exercise enough to make your heart beat faster?: 5  How many days per week do you miss taking your medication?: 0  General Concern (Submitted on 3/4/2024)  Chief Complaint: Chronic problems general questions HPI Form  What is the reason for your visit today?: injury confirmation  When did your symptoms begin?: 3-4 weeks ago  What are your symptoms?: Soreness  How would you describe these symptoms?: Mild  Are your symptoms:: Improving  Have you had these symptoms before?: No         Past Medical History:     Past Medical History:   Diagnosis Date    BPH  (benign prostatic hyperplasia)     CAD (coronary artery disease)     MI 1989, CABG 1990, 11/2014 PCI to LM, LCx and RCA    Conductive hearing loss Unknown.    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    Skin disease     Squamous cell carcinoma             Past Surgical History:     Past Surgical History:   Procedure Laterality Date    CHOLECYSTECTOMY  1992    in [place]    ENDOSCOPY  04/26/2013    Done at [facility] in [place], [place]    GENITOURINARY SURGERY      HEART CATH STENT COR W/WO PTCA  11/12/2014    x 4 ANKUR's (two ostial to mid-RCA, one ostial LCx and mid-LM, one ostial LAD)    HERNIA REPAIR Right 2010    in [place]    LASER KTP GREEN LIGHT PHOTOSELECTIVE VAPORIZATION PROSTATE N/A 1/5/2015    Procedure: LASER KTP GREEN LIGHT PHOTOSELECTIVE VAPORIZATION PROSTATE;  Surgeon: Natalie Porter MD;  Location: [facility] OR    MOHS MICROGRAPHIC PROCEDURE      ZZC CABG, VEIN, THREE  1990    [facility]-LAD in [place]       Current Outpatient Medications   Medication Sig Dispense Refill    alfuzosin ER (UROXATRAL) 10 MG 24 hr tablet Take 1 tablet (10 mg) by mouth daily 30 tablet 11    aspirin 81 MG tablet Take 81 mg by mouth daily      carBAMazepine (CARBATROL) 200 MG 12 hr capsule Take 1 capsule (200 mg) by mouth 2 times daily as needed (trigeminal neuralgia) 30 capsule 3    carBAMazepine (EPITOL) 200 MG tablet Take 1 tablet (200 mg) by mouth 2 times daily as needed (for trigeminal neuralgia) 30 tablet 3    eszopiclone (LUNESTA) 2 MG tablet Take 1 tablet (2 mg) by mouth nightly as needed for sleep (insomnia) 15 tablet 1    eszopiclone (LUNESTA) 2 MG  tablet Take 1 tablet (2 mg) by mouth nightly as needed 15 tablet 5    melatonin 1 MG CAPS       metoprolol succinate ER (TOPROL XL) 25 MG 24 hr tablet TAKE 1/2 (ONE-HALF) TABLET BY MOUTH ONCE DAILY 45 tablet 3    metroNIDAZOLE (METROCREAM) 0.75 % external cream Apply topically 2 times daily 45 g 11    nitroGLYcerin (NITROSTAT) 0.4 MG sublingual tablet Place 1 tablet (0.4 mg) under the tongue every 5 minutes as needed for chest pain For chest pain place 1 tablet under the tongue every 5 minutes for 3 doses. If symptoms persist 5 minutes after 1st dose call 911. 30 tablet 1    nitroGLYcerin (NITROSTAT) 0.4 MG sublingual tablet Place 1 tablet (0.4 mg) under the tongue every 5 minutes as needed for chest pain *DO NOT EXCEED A TOTAL OF 3 DOSES IN 15 MINUTES. 15 tablet 3    simvastatin (ZOCOR) 40 MG tablet Take 1 tablet (40 mg) by mouth at bedtime 90 tablet 3    valACYclovir (VALTREX) 1000 mg tablet Take 1 pill twice a day for 3 days at the first sign of a cold core. 6 tablet 3          Allergies   Allergen Reactions    Macrodantin [Nitrofurantoin] Rash    Sulfa Antibiotics Rash    Tamsulosin Rash            Family History:     Family History   Problem Relation Age of Onset    Cancer Father         Stomach    Heart Disease Mother     Heart Disease Maternal Grandmother     Skin Cancer No family hx of     Melanoma No family hx of             Social History:     Social History     Socioeconomic History    Marital status:      Spouse name: Not on file    Number of children: Not on file    Years of education: Not on file    Highest education level: Not on file   Occupational History    Not on file   Tobacco Use    Smoking status: Former     Packs/day: 0.50     Years: 1.00     Additional pack years: 0.00     Total pack years: 0.50     Types: Cigarettes     Start date: 1965     Quit date: 1970     Years since quittin.8    Smokeless tobacco: Never   Substance and Sexual Activity    Alcohol use: Yes      "Comment: rarely - a beer a month    Drug use: No    Sexual activity: Yes     Partners: Female     Birth control/protection: Male Surgical   Other Topics Concern    Parent/sibling w/ CABG, MI or angioplasty before 65F 55M? Not Asked   Social History Narrative    Not on file     Social Determinants of Health     Financial Resource Strain: Low Risk  (10/23/2023)    Financial Resource Strain     Within the past 12 months, have you or your family members you live with been unable to get utilities (heat, electricity) when it was really needed?: No   Food Insecurity: Low Risk  (10/23/2023)    Food Insecurity     Within the past 12 months, did you worry that your food would run out before you got money to buy more?: No     Within the past 12 months, did the food you bought just not last and you didn t have money to get more?: No   Transportation Needs: Low Risk  (10/23/2023)    Transportation Needs     Within the past 12 months, has lack of transportation kept you from medical appointments, getting your medicines, non-medical meetings or appointments, work, or from getting things that you need?: No   Physical Activity: Not on file   Stress: Not on file   Social Connections: Not on file   Interpersonal Safety: Low Risk  (10/25/2023)    Interpersonal Safety     Do you feel physically and emotionally safe where you currently live?: Yes     Within the past 12 months, have you been hit, slapped, kicked or otherwise physically hurt by someone?: No     Within the past 12 months, have you been humiliated or emotionally abused in other ways by your partner or ex-partner?: No   Housing Stability: Low Risk  (10/23/2023)    Housing Stability     Do you have housing? : Yes     Are you worried about losing your housing?: No            Physical Exam:   /68 (BP Location: Right arm, Patient Position: Sitting, Cuff Size: Adult Regular)   Pulse 75   Ht 1.787 m (5' 10.35\")   Wt 71 kg (156 lb 8 oz)   SpO2 99%   BMI 22.23 kg/m    Body " mass index is 22.23 kg/m .  Vitals were reviewed     GENERAL: alert and no distress  EYES: Eyes grossly normal to inspection, PERRL and conjunctivae and sclerae normal  RESP: lungs clear to auscultation - no rales, rhonchi or wheezes  CV: regular rate and rhythm, normal S1 S2, no S3 or S4, no murmur, click or rub, no peripheral edema  MS: no gross musculoskeletal defects noted, no edema, lateral deviation of the right ankle. Large non-tender bruise extending over the posterior surface of the hamstring was noted.  SKIN: no suspicious lesions or rashes  NEURO: Normal strength and tone, mentation intact and speech normal  PSYCH: mentation appears normal, affect normal/bright      Assessment and Plan     Jacinto Flannery is a 83 year old male with PMH of CAD, hypertension and hyperlipidemia who presents for a bruise assessment. Overall patient reports healing well with no antalgic gait and no limitation of his exercise capacity. Strength and sensation are intact. Recommended conservative management with ice therapy for faster resolution of the hematoma. If it persists, we recommended follow-up as needed in three weeks. Patient agreeable. Congratulated him for his excellent lifestyle.  - Ice therapy  - Follow-up in three weeks PRN     Options for treatment and follow-up care were reviewed with the patient. Jacinto Flannery engaged in the decision making process and verbalized understanding of the options discussed and agreed with the final plan.    Jose Armando Bragg MD  Internal Medicine, PGY-3  [facility]  [phone]   Mar 5, 2024      Pt was seen and plan of care discussed with Dr Mccormick.    While the patient was in clinic, I reviewed the pertinent medical history and results.  I discussed the current findings on physical examination, as well as the patient s diagnosis and treatment plan with the resident and agree with the information as documented with the following exceptions: none.  Rosalinda Mccormick,  MD  Internal Medicine

## 2024-03-05 NOTE — PROGRESS NOTES
"    Subjective   Sammy is a 83 year old, presenting for the following health issues:  Consult (Lower right buttock pain/bruise, Hiking accident 3 weeks ago in [place])      3/5/2024    12:06 PM   Additional Questions   Roomed by [facility] EMT     History of Present Illness       Reason for visit:  Injury confirmation  Symptom onset:  3-4 weeks ago  Symptoms include:  Soreness  Symptom intensity:  Mild  Symptom progression:  Improving  Had these symptoms before:  No    He eats 2-3 servings of fruits and vegetables daily.He consumes 0 sweetened beverage(s) daily.He exercises with enough effort to increase his heart rate 30 to 60 minutes per day.  He exercises with enough effort to increase his heart rate 5 days per week.   He is taking medications regularly.                     Objective    /68 (BP Location: Right arm, Patient Position: Sitting, Cuff Size: Adult Regular)   Pulse 75   Ht 1.787 m (5' 10.35\")   Wt 71 kg (156 lb 8 oz)   SpO2 99%   BMI 22.23 kg/m    Body mass index is 22.23 kg/m .  Physical Exam               Signed Electronically by: Jose Armando Bragg MD    "

## 2024-04-10 ENCOUNTER — OFFICE VISIT (OUTPATIENT)
Dept: DERMATOLOGY | Facility: CLINIC | Age: 84
End: 2024-04-10
Attending: DERMATOLOGY
Payer: MEDICARE

## 2024-04-10 DIAGNOSIS — D22.9 MULTIPLE NEVI: Primary | ICD-10-CM

## 2024-04-10 DIAGNOSIS — L71.9 ROSACEA: ICD-10-CM

## 2024-04-10 DIAGNOSIS — Z85.828 HISTORY OF NONMELANOMA SKIN CANCER: ICD-10-CM

## 2024-04-10 DIAGNOSIS — D18.01 CHERRY ANGIOMA: ICD-10-CM

## 2024-04-10 DIAGNOSIS — L82.1 SK (SEBORRHEIC KERATOSIS): ICD-10-CM

## 2024-04-10 PROCEDURE — 99213 OFFICE O/P EST LOW 20 MIN: CPT | Mod: GC | Performed by: DERMATOLOGY

## 2024-04-10 ASSESSMENT — PAIN SCALES - GENERAL: PAINLEVEL: NO PAIN (0)

## 2024-04-10 NOTE — LETTER
4/10/2024       RE: Jacinto Flannery  [address]  [place] [facility] [address]     Dear Colleague,    Thank you for referring your patient, Jacinto Flannery, to the [facility] [place] at [facility]. Please see a copy of my visit note below.    [facility] Dermatology Note  Encounter Date: Apr 10, 2024  Office Visit     Dermatology Problem List:  Last FBSE: 10/24/23      # NMSC  - SCC, left (dorsal) forearm, s/p excision 8/18/2015  - SCC, right dorsal hand, s/p Mohs 3/18/2015  # Compound nevus with moderate dysplasia, mid upper back   - s/p biopsy 10/16/2015  # Actinic keratosis  - s/p cryotherapy 07/20/2016, 03/06/19, 04/12/2023  - efudex in November 2016 (used for 19 days), pt had a very robust response  - pt given hydrocortisone 2.5% cream BID PRN due to severe response and told to stop efudex  # Unknown skin eruption penis. Resolved 7/5/17  - s/p triamcinolone cream, nystatin cream, mupirocin, and valtrex  - s/p biopsy showing hemorraghic scale crust and ulceration 2/3/2015  # Drug exanthem, 2/2 to tamsulosin  - s/p biopsy showing interface dermatitis 12/29/2014  - s/p lidex initiated 1/8/2015  - s/p clobetasol initiated 12/29/2014-improved  # Traumatic tattoo  - Central forehead, measured 1X1.5cm on 10/16/17  # Rosacea  - Current Tx: Metronidazole cream    ____________________________________________    Assessment & Plan:      # Multiple clinically banal appearing nevi  # Lentigo  Discussed the possible etiologies, clinical course, and management options of this benign condition with the patient.  - Recommend daily sunscreen use with SPF at least 30     # Seborrheic keratoses  # Cherry angiomata  Discussed the possible etiologies, clinical course, and management options of this benign condition with the patient.  - Reassurance provided     #Rosacea  - Patient can continue to use Metronidazole prn     # H/o NMSC  No  evidence of recurrence of disease  - We will continue to monitor    Procedures Performed:   None    Follow-up: 6 month(s) in-person, or earlier for new or changing lesions    Staff and Resident:     Tiffany Perez MD  Internal Medicine, PGY3    Anjelica Quarles MD  Staff Physician  I, Anjelica Quarles MD, saw this patient with the resident and agree with the resident s findings and plan of care as documented in the resident s note.    ____________________________________________    CC: Derm Problem (FBSE- no spots of concern)    HPI:  Mr. Jacinto Flannery is a(n) 83 year old male who presents today as a return patient for a full body skin check. He undergoes FBSE every 6 months.    Patient is feeling well today. He has not noticed any new or concerning skin lesions. Denies non-healing skin wounds or areas that bother him. When pressed, he notes that he will sometimes pick at a lesion at the top of his scalp, typically in the mornings, and notes that the lesion has been present and unchanging for several years.     Labs Reviewed:  N/A    Physical Exam:  Vitals: There were no vitals taken for this visit.  SKIN: Total skin excluding the undergarment areas was performed. The exam included the head/face, neck, both arms, chest, back, abdomen, both legs, digits and/or nails.     - There are dome shaped bright red papules on the chest and back.   - Multiple regular brown pigmented macules and papules are identified scattered throughout all areas of exposed skin including ears.    - specifically, similar lesion with benign appearance per dermatoscope evaluation on R ear   - There are waxy stuck on tan to brown papules in a regino-tree like pattern scattered throughout the back. Similar lesions also present on the arms, chest, face, extremities.   - small telangiectasias, erythematous patch w/ mild irritation on L face near nose   - There is a tan to brown waxy stuck on papule with overlying scale on the L scalp    - No  other lesions of concern on areas examined.     Medications:  Current Outpatient Medications   Medication Sig Dispense Refill    alfuzosin ER (UROXATRAL) 10 MG 24 hr tablet Take 1 tablet (10 mg) by mouth daily 30 tablet 11    aspirin 81 MG tablet Take 81 mg by mouth daily      carBAMazepine (CARBATROL) 200 MG 12 hr capsule Take 1 capsule (200 mg) by mouth 2 times daily as needed (trigeminal neuralgia) 30 capsule 3    eszopiclone (LUNESTA) 2 MG tablet Take 1 tablet (2 mg) by mouth nightly as needed for sleep (insomnia) 15 tablet 1    melatonin 1 MG CAPS       metoprolol succinate ER (TOPROL XL) 25 MG 24 hr tablet TAKE 1/2 (ONE-HALF) TABLET BY MOUTH ONCE DAILY 45 tablet 3    metroNIDAZOLE (METROCREAM) 0.75 % external cream Apply topically 2 times daily 45 g 11    nitroGLYcerin (NITROSTAT) 0.4 MG sublingual tablet Place 1 tablet (0.4 mg) under the tongue every 5 minutes as needed for chest pain For chest pain place 1 tablet under the tongue every 5 minutes for 3 doses. If symptoms persist 5 minutes after 1st dose call 911. 30 tablet 1    simvastatin (ZOCOR) 40 MG tablet Take 1 tablet (40 mg) by mouth at bedtime 90 tablet 3    valACYclovir (VALTREX) 1000 mg tablet Take 1 pill twice a day for 3 days at the first sign of a cold core. 6 tablet 3    carBAMazepine (EPITOL) 200 MG tablet Take 1 tablet (200 mg) by mouth 2 times daily as needed (for trigeminal neuralgia) 30 tablet 3    eszopiclone (LUNESTA) 2 MG tablet Take 1 tablet (2 mg) by mouth nightly as needed 15 tablet 5    nitroGLYcerin (NITROSTAT) 0.4 MG sublingual tablet Place 1 tablet (0.4 mg) under the tongue every 5 minutes as needed for chest pain *DO NOT EXCEED A TOTAL OF 3 DOSES IN 15 MINUTES. 15 tablet 3     Current Facility-Administered Medications   Medication Dose Route Frequency Provider Last Rate Last Admin    lidocaine 1% with EPINEPHrine 1:100,000 injection 0.5 mL  0.5 mL Intradermal Once Anjelica Quarles MD          Past Medical History:    Patient Active Problem List   Diagnosis    CAD S/P percutaneous coronary angioplasty    CAD (coronary artery disease)    BPH (benign prostatic hyperplasia)    Hyperlipidemia with target LDL less than 70    S/P coronary artery stent placement    GERD (gastroesophageal reflux disease)    S/P TURP    Dermatitis    AK (actinic keratosis)    SCC (squamous cell carcinoma)    SK (seborrheic keratosis)    EIC (epidermal inclusion cyst)    History of squamous cell carcinoma    Seborrheic keratosis    Inflamed seborrheic keratosis    History of dysplastic nevus    Varicose veins    Medication reaction, initial encounter    HSV (herpes simplex virus) infection    Hypertension    Insomnia    Thrombocytopenia (H24)    Benign prostatic hyperplasia with urinary obstruction    Hypertrophy of prostate without urinary obstruction and other lower urinary tract symptoms (LUTS)    Atherosclerosis of coronary artery    Recurrent coronary arteriosclerosis after percutaneous transluminal coronary angioplasty    History of atypical nevus    Hyperlipidemia    History of transurethral resection of prostate    Presence of coronary angioplasty implant and graft    Squamous cell carcinoma of skin    Varicose veins of other specified sites     Past Medical History:   Diagnosis Date    BPH (benign prostatic hyperplasia)     CAD (coronary artery disease)     MI 1989, CABG 1990, 11/2014 PCI to LM, LCx and RCA    Conductive hearing loss Unknown.    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    Skin disease     Squamous cell carcinoma        TIGIST Quarles MD  [address] [place] [address]  [address] [id number] on close of this encounter.

## 2024-04-10 NOTE — PROGRESS NOTES
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 Resolved 7/5/17  - s/p triamcinolone cream, nystatin cream, mupirocin, and valtrex  - s/p biopsy showing hemorraghic scale crust and ulceration 2/3/2015  # Drug exanthem, 2/2 to tamsulosin  - s/p biopsy showing interface dermatitis 12/29/2014  - s/p lidex initiated 1/8/2015  - s/p clobetasol initiated 12/29/2014-improved  # Traumatic tattoo  - Central forehead, measured 1X1.5cm on 10/16/17  # Rosacea  - Current Tx: Metronidazole cream    ____________________________________________    Assessment & Plan:      # Multiple clinically banal appearing nevi  # Lentigo  Discussed the possible etiologies, clinical course, and management options of this benign condition with the patient.  - Recommend daily sunscreen use with SPF at least 30     # Seborrheic keratoses  # Cherry angiomata  Discussed the possible etiologies, clinical course, and management options of this benign condition with the patient.  - Reassurance provided     #Rosacea  - Patient can continue to use Metronidazole prn     # H/o NMSC  No evidence of recurrence of disease  - We will continue to monitor    Procedures Performed:   None    Follow-up: 6 month(s) in-person, or earlier for new or changing lesions    Staff and Resident:     Tiffany Perez MD  Internal Medicine, PGY3    Anjelica Quarles MD  Staff Physician  I, Anjelica Quarles MD, saw this  patient with the resident and agree with the resident s findings and plan of care as documented in the resident s note.    ____________________________________________    CC: Derm Problem (FBSE- no spots of concern)    HPI:  Mr. Jacinto Flannery is a(n) 83 year old male who presents today as a return patient for a full body skin check. He undergoes FBSE every 6 months.    Patient is feeling well today. He has not noticed any new or concerning skin lesions. Denies non-healing skin wounds or areas that bother him. When pressed, he notes that he will sometimes pick at a lesion at the top of his scalp, typically in the mornings, and notes that the lesion has been present and unchanging for several years.     Labs Reviewed:  N/A    Physical Exam:  Vitals: There were no vitals taken for this visit.  SKIN: Total skin excluding the undergarment areas was performed. The exam included the head/face, neck, both arms, chest, back, abdomen, both legs, digits and/or nails.     - There are dome shaped bright red papules on the chest and back.   - Multiple regular brown pigmented macules and papules are identified scattered throughout all areas of exposed skin including ears.    - specifically, similar lesion with benign appearance per dermatoscope evaluation on R ear   - There are waxy stuck on tan to brown papules in a regino-tree like pattern scattered throughout the back. Similar lesions also present on the arms, chest, face, extremities.   - small telangiectasias, erythematous patch w/ mild irritation on L face near nose   - There is a tan to brown waxy stuck on papule with overlying scale on the L scalp    - No other lesions of concern on areas examined.     Medications:  Current Outpatient Medications   Medication Sig Dispense Refill    alfuzosin ER (UROXATRAL) 10 MG 24 hr tablet Take 1 tablet (10 mg) by mouth daily 30 tablet 11    aspirin 81 MG tablet Take 81 mg by mouth daily      carBAMazepine (CARBATROL) 200 MG 12 hr  capsule Take 1 capsule (200 mg) by mouth 2 times daily as needed (trigeminal neuralgia) 30 capsule 3    eszopiclone (LUNESTA) 2 MG tablet Take 1 tablet (2 mg) by mouth nightly as needed for sleep (insomnia) 15 tablet 1    melatonin 1 MG CAPS       metoprolol succinate ER (TOPROL XL) 25 MG 24 hr tablet TAKE 1/2 (ONE-HALF) TABLET BY MOUTH ONCE DAILY 45 tablet 3    metroNIDAZOLE (METROCREAM) 0.75 % external cream Apply topically 2 times daily 45 g 11    nitroGLYcerin (NITROSTAT) 0.4 MG sublingual tablet Place 1 tablet (0.4 mg) under the tongue every 5 minutes as needed for chest pain For chest pain place 1 tablet under the tongue every 5 minutes for 3 doses. If symptoms persist 5 minutes after 1st dose call 911. 30 tablet 1    simvastatin (ZOCOR) 40 MG tablet Take 1 tablet (40 mg) by mouth at bedtime 90 tablet 3    valACYclovir (VALTREX) 1000 mg tablet Take 1 pill twice a day for 3 days at the first sign of a cold core. 6 tablet 3    carBAMazepine (EPITOL) 200 MG tablet Take 1 tablet (200 mg) by mouth 2 times daily as needed (for trigeminal neuralgia) 30 tablet 3    eszopiclone (LUNESTA) 2 MG tablet Take 1 tablet (2 mg) by mouth nightly as needed 15 tablet 5    nitroGLYcerin (NITROSTAT) 0.4 MG sublingual tablet Place 1 tablet (0.4 mg) under the tongue every 5 minutes as needed for chest pain *DO NOT EXCEED A TOTAL OF 3 DOSES IN 15 MINUTES. 15 tablet 3     Current Facility-Administered Medications   Medication Dose Route Frequency Provider Last Rate Last Admin    lidocaine 1% with EPINEPHrine 1:100,000 injection 0.5 mL  0.5 mL Intradermal Once Anjelica Quarles MD          Past Medical History:   Patient Active Problem List   Diagnosis    CAD S/P percutaneous coronary angioplasty    CAD (coronary artery disease)    BPH (benign prostatic hyperplasia)    Hyperlipidemia with target LDL less than 70    S/P coronary artery stent placement    GERD (gastroesophageal reflux disease)    S/P TURP    Dermatitis    AK  (actinic keratosis)    SCC (squamous cell carcinoma)    SK (seborrheic keratosis)    EIC (epidermal inclusion cyst)    History of squamous cell carcinoma    Seborrheic keratosis    Inflamed seborrheic keratosis    History of dysplastic nevus    Varicose veins    Medication reaction, initial encounter    HSV (herpes simplex virus) infection    Hypertension    Insomnia    Thrombocytopenia (H24)    Benign prostatic hyperplasia with urinary obstruction    Hypertrophy of prostate without urinary obstruction and other lower urinary tract symptoms (LUTS)    Atherosclerosis of coronary artery    Recurrent coronary arteriosclerosis after percutaneous transluminal coronary angioplasty    History of atypical nevus    Hyperlipidemia    History of transurethral resection of prostate    Presence of coronary angioplasty implant and graft    Squamous cell carcinoma of skin    Varicose veins of other specified sites     Past Medical History:   Diagnosis Date    BPH (benign prostatic hyperplasia)     CAD (coronary artery disease)     MI 1989, CABG 1990, 11/2014 PCI to LM, LCx and RCA    Conductive hearing loss Unknown.    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    Skin disease     Squamous cell carcinoma        CC Anjelica Quarles MD  [address]  [place] [address] on close of this encounter.

## 2024-04-10 NOTE — NURSING NOTE
Dermatology Rooming Note    Jacinto Flannery's goals for this visit include:   Chief Complaint   Patient presents with    Derm Problem     FBSE- no spots of concern     Fide Urbina, EMT

## 2024-10-07 ENCOUNTER — PRE VISIT (OUTPATIENT)
Dept: UROLOGY | Facility: CLINIC | Age: 84
End: 2024-10-07
Payer: MEDICARE

## 2024-10-07 ENCOUNTER — MYC MEDICAL ADVICE (OUTPATIENT)
Dept: INTERNAL MEDICINE | Facility: CLINIC | Age: 84
End: 2024-10-07
Payer: MEDICARE

## 2024-10-07 DIAGNOSIS — I25.10 CORONARY ARTERY DISEASE INVOLVING NATIVE CORONARY ARTERY OF NATIVE HEART WITHOUT ANGINA PECTORIS: ICD-10-CM

## 2024-10-07 DIAGNOSIS — E78.5 HYPERLIPIDEMIA WITH TARGET LDL LESS THAN 70: Primary | ICD-10-CM

## 2024-10-07 NOTE — TELEPHONE ENCOUNTER
Reason for visit: annual visit     Relevant information: BPH w/ LUTS    Records/imaging/labs/orders: all records available    Augustina Langston  10/7/2024  11:27 AM

## 2024-10-08 ENCOUNTER — LAB (OUTPATIENT)
Dept: LAB | Facility: CLINIC | Age: 84
End: 2024-10-08
Payer: MEDICARE

## 2024-10-08 DIAGNOSIS — I25.10 CORONARY ARTERY DISEASE INVOLVING NATIVE CORONARY ARTERY OF NATIVE HEART WITHOUT ANGINA PECTORIS: ICD-10-CM

## 2024-10-08 DIAGNOSIS — E78.5 HYPERLIPIDEMIA WITH TARGET LDL LESS THAN 70: ICD-10-CM

## 2024-10-08 LAB
ALBUMIN SERPL BCG-MCNC: 4.5 G/DL (ref 3.5–5.2)
ALP SERPL-CCNC: 59 U/L (ref 40–150)
ALT SERPL W P-5'-P-CCNC: 17 U/L (ref 0–70)
ANION GAP SERPL CALCULATED.3IONS-SCNC: 8 MMOL/L (ref 7–15)
AST SERPL W P-5'-P-CCNC: 29 U/L (ref 0–45)
BILIRUB SERPL-MCNC: 0.7 MG/DL
BUN SERPL-MCNC: 17.8 MG/DL (ref 8–23)
CALCIUM SERPL-MCNC: 9.5 MG/DL (ref 8.8–10.4)
CHLORIDE SERPL-SCNC: 103 MMOL/L (ref 98–107)
CHOLEST SERPL-MCNC: 159 MG/DL
CREAT SERPL-MCNC: 0.97 MG/DL (ref 0.67–1.17)
EGFRCR SERPLBLD CKD-EPI 2021: 78 ML/MIN/1.73M2
FASTING STATUS PATIENT QL REPORTED: YES
FASTING STATUS PATIENT QL REPORTED: YES
GLUCOSE SERPL-MCNC: 95 MG/DL (ref 70–99)
HCO3 SERPL-SCNC: 29 MMOL/L (ref 22–29)
HDLC SERPL-MCNC: 58 MG/DL
LDLC SERPL CALC-MCNC: 82 MG/DL
NONHDLC SERPL-MCNC: 101 MG/DL
POTASSIUM SERPL-SCNC: 4.5 MMOL/L (ref 3.4–5.3)
PROT SERPL-MCNC: 7 G/DL (ref 6.4–8.3)
SODIUM SERPL-SCNC: 140 MMOL/L (ref 135–145)
TRIGL SERPL-MCNC: 95 MG/DL

## 2024-10-08 PROCEDURE — 80061 LIPID PANEL: CPT | Performed by: PATHOLOGY

## 2024-10-08 PROCEDURE — 36415 COLL VENOUS BLD VENIPUNCTURE: CPT | Performed by: PATHOLOGY

## 2024-10-08 PROCEDURE — 80053 COMPREHEN METABOLIC PANEL: CPT | Performed by: PATHOLOGY

## 2024-10-18 SDOH — HEALTH STABILITY: PHYSICAL HEALTH: ON AVERAGE, HOW MANY MINUTES DO YOU ENGAGE IN EXERCISE AT THIS LEVEL?: 50 MIN

## 2024-10-18 SDOH — HEALTH STABILITY: PHYSICAL HEALTH: ON AVERAGE, HOW MANY DAYS PER WEEK DO YOU ENGAGE IN MODERATE TO STRENUOUS EXERCISE (LIKE A BRISK WALK)?: 5 DAYS

## 2024-10-18 ASSESSMENT — SOCIAL DETERMINANTS OF HEALTH (SDOH): HOW OFTEN DO YOU GET TOGETHER WITH FRIENDS OR RELATIVES?: TWICE A WEEK
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
## 2024-10-22 ENCOUNTER — OFFICE VISIT (OUTPATIENT)
Dept: UROLOGY | Facility: CLINIC | Age: 84
End: 2024-10-22
Payer: MEDICARE

## 2024-10-22 VITALS
WEIGHT: 150 LBS | DIASTOLIC BLOOD PRESSURE: 72 MMHG | HEIGHT: 71 IN | BODY MASS INDEX: 21 KG/M2 | OXYGEN SATURATION: 99 % | HEART RATE: 57 BPM | SYSTOLIC BLOOD PRESSURE: 116 MMHG

## 2024-10-22 DIAGNOSIS — N50.82 SCROTAL PAIN: Primary | ICD-10-CM

## 2024-10-22 DIAGNOSIS — N40.1 BENIGN NON-NODULAR PROSTATIC HYPERPLASIA WITH LOWER URINARY TRACT SYMPTOMS: ICD-10-CM

## 2024-10-22 PROCEDURE — 99214 OFFICE O/P EST MOD 30 MIN: CPT | Performed by: NURSE PRACTITIONER

## 2024-10-22 RX ORDER — ALFUZOSIN HYDROCHLORIDE 10 MG/1
1 TABLET, EXTENDED RELEASE ORAL DAILY
Qty: 90 TABLET | Refills: 3 | Status: SHIPPED | OUTPATIENT
Start: 2024-10-22

## 2024-10-22 ASSESSMENT — PAIN SCALES - GENERAL: PAINLEVEL: NO PAIN (0)
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
## 2024-10-22 NOTE — NURSING NOTE
"Jacinto Flannery is a 84 year old male patient that presents today in clinic for the following:    Chief Complaint   Patient presents with    Follow Up     BPH w/ LUTS       The patient's allergies and medications were reviewed as noted. A set of vitals were recorded as noted without incident. The patient does not have any other questions for the provider.    Blood pressure 116/72, pulse 57, height 1.803 m (5' 11\"), weight 68 kg (150 lb), SpO2 99%. Body mass index is 20.92 kg/m .    Patient Active Problem List   Diagnosis    CAD S/P percutaneous coronary angioplasty    CAD (coronary artery disease)    BPH (benign prostatic hyperplasia)    Hyperlipidemia with target LDL less than 70    S/P coronary artery stent placement    GERD (gastroesophageal reflux disease)    S/P TURP    Dermatitis    AK (actinic keratosis)    SCC (squamous cell carcinoma)    SK (seborrheic keratosis)    EIC (epidermal inclusion cyst)    History of squamous cell carcinoma    Seborrheic keratosis    Inflamed seborrheic keratosis    History of dysplastic nevus    Varicose veins    Medication reaction, initial encounter    HSV (herpes simplex virus) infection    Hypertension    Insomnia    Thrombocytopenia (H)    Benign prostatic hyperplasia with urinary obstruction    Hypertrophy of prostate without urinary obstruction and other lower urinary tract symptoms (LUTS)    Atherosclerosis of coronary artery    Recurrent coronary arteriosclerosis after percutaneous transluminal coronary angioplasty    History of atypical nevus    Hyperlipidemia    History of transurethral resection of prostate    Presence of coronary angioplasty implant and graft    Squamous cell carcinoma of skin    Varicose veins of other specified sites       Allergies   Allergen Reactions    Macrodantin [Nitrofurantoin] Rash    Sulfa Antibiotics Rash    Tamsulosin Rash       Current Outpatient Medications   Medication Sig Dispense Refill    alfuzosin ER (UROXATRAL) 10 MG 24 hr tablet " Take 1 tablet (10 mg) by mouth daily 30 tablet 11    aspirin 81 MG tablet Take 81 mg by mouth daily      carBAMazepine (CARBATROL) 200 MG 12 hr capsule Take 1 capsule (200 mg) by mouth 2 times daily as needed (trigeminal neuralgia) 30 capsule 3    eszopiclone (LUNESTA) 2 MG tablet Take 1 tablet (2 mg) by mouth nightly as needed for sleep (insomnia) 15 tablet 1    melatonin 1 MG CAPS       metoprolol succinate ER (TOPROL XL) 25 MG 24 hr tablet TAKE 1/2 (ONE-HALF) TABLET BY MOUTH ONCE DAILY 45 tablet 3    metroNIDAZOLE (METROCREAM) 0.75 % external cream Apply topically 2 times daily 45 g 11    nitroGLYcerin (NITROSTAT) 0.4 MG sublingual tablet Place 1 tablet (0.4 mg) under the tongue every 5 minutes as needed for chest pain For chest pain place 1 tablet under the tongue every 5 minutes for 3 doses. If symptoms persist 5 minutes after 1st dose call 911. 30 tablet 1    simvastatin (ZOCOR) 40 MG tablet Take 1 tablet (40 mg) by mouth at bedtime 90 tablet 3    valACYclovir (VALTREX) 1000 mg tablet Take 1 pill twice a day for 3 days at the first sign of a cold core. 6 tablet 3    carBAMazepine (EPITOL) 200 MG tablet Take 1 tablet (200 mg) by mouth 2 times daily as needed (for trigeminal neuralgia) 30 tablet 3    eszopiclone (LUNESTA) 2 MG tablet Take 1 tablet (2 mg) by mouth nightly as needed 15 tablet 5    nitroGLYcerin (NITROSTAT) 0.4 MG sublingual tablet Place 1 tablet (0.4 mg) under the tongue every 5 minutes as needed for chest pain *DO NOT EXCEED A TOTAL OF 3 DOSES IN 15 MINUTES. 15 tablet 3       Social History     Tobacco Use    Smoking status: Former     Current packs/day: 0.00     Average packs/day: 0.5 packs/day for 4.7 years (2.3 ttl pk-yrs)     Types: Cigarettes     Start date: 1965     Quit date: 1970     Years since quittin.5    Smokeless tobacco: Never   Substance Use Topics    Alcohol use: Yes     Comment: rarely - a beer a month    Drug use: No       Stacey Rouse LPN  10/22/2024  10:27 AM

## 2024-10-22 NOTE — PATIENT INSTRUCTIONS
UROLOGY CLINIC VISIT PATIENT INSTRUCTIONS    -Will pursue a testicular ultrasound now.   -Continue on the alfuzosin.   -Follow up with me in one year, or sooner if needed.     If you have any issues, questions or concerns in the meantime, do not hesitate to contact us at [phone] or via [facility].     Paulette Marshall, CNP  Department of Urology

## 2024-10-23 ENCOUNTER — OFFICE VISIT (OUTPATIENT)
Dept: INTERNAL MEDICINE | Facility: CLINIC | Age: 84
End: 2024-10-23
Payer: MEDICARE

## 2024-10-23 VITALS
BODY MASS INDEX: 21.39 KG/M2 | OXYGEN SATURATION: 99 % | RESPIRATION RATE: 16 BRPM | WEIGHT: 152.8 LBS | SYSTOLIC BLOOD PRESSURE: 114 MMHG | DIASTOLIC BLOOD PRESSURE: 79 MMHG | HEIGHT: 71 IN | HEART RATE: 68 BPM | TEMPERATURE: 97.4 F

## 2024-10-23 DIAGNOSIS — I25.812 CORONARY ARTERY DISEASE INVOLVING BYPASS GRAFT OF TRANSPLANTED HEART WITHOUT ANGINA PECTORIS: ICD-10-CM

## 2024-10-23 DIAGNOSIS — G47.00 INSOMNIA, UNSPECIFIED TYPE: ICD-10-CM

## 2024-10-23 DIAGNOSIS — I25.10 CORONARY ARTERY DISEASE INVOLVING NATIVE CORONARY ARTERY OF NATIVE HEART WITHOUT ANGINA PECTORIS: ICD-10-CM

## 2024-10-23 DIAGNOSIS — G50.0 TRIGEMINAL NEURALGIA: ICD-10-CM

## 2024-10-23 PROCEDURE — G0439 PPPS, SUBSEQ VISIT: HCPCS | Performed by: INTERNAL MEDICINE

## 2024-10-23 RX ORDER — SIMVASTATIN 40 MG
40 TABLET ORAL AT BEDTIME
Qty: 90 TABLET | Refills: 3 | Status: SHIPPED | OUTPATIENT
Start: 2024-10-23

## 2024-10-23 RX ORDER — ESZOPICLONE 2 MG/1
2 TABLET, FILM COATED ORAL
Qty: 15 TABLET | Refills: 1 | Status: SHIPPED | OUTPATIENT
Start: 2024-10-23

## 2024-10-23 RX ORDER — METOPROLOL SUCCINATE 25 MG/1
TABLET, EXTENDED RELEASE ORAL
Qty: 45 TABLET | Refills: 3 | Status: SHIPPED | OUTPATIENT
Start: 2024-10-23

## 2024-10-23 RX ORDER — CARBAMAZEPINE 200 MG/1
200 CAPSULE, EXTENDED RELEASE ORAL 2 TIMES DAILY PRN
Qty: 30 CAPSULE | Refills: 3 | Status: SHIPPED | OUTPATIENT
Start: 2024-10-23

## 2024-10-23 NOTE — PROGRESS NOTES
Sammy was seen today for physical, lab review, and med refills.  He is doing well, no major concerns. We did discuss right sided lateral neck pain which is located in the sternocleidomastoid and trapezius ridge where he points.  It only occurs with certain motions such as lifting his right arm and holding it up while brushing his teeth using an electric toothbrush for 2-3 minutes.  It does not occur at any other time, it is mostly fleeting in nature and relieved by changing positions.  We did discuss a PT referral versus other remedies such as muscle relaxants but he does not think it is bothersome enough, at this point.    His weight is on the low side but stable.  His BMI is still technically in the normal range so I am not concerned but advised him to consider increasing the protein in his diet and adding in some strength training to maintain muscle mass.    No changes to past, family or social history and  ROS: 10 point ROS neg other than the symptoms noted above in the HPI. Physical exam as listed below was unremarkable.    A/P 84 year old male here for preventive physical.  Labs earlier this month were very favorable including cholesterol panel, comprehensive metabolic panel.  Continue current medications.    Trigeminal neuralgia  -     carBAMazepine (CARBATROL) 200 MG 12 hr capsule; Take 1 capsule (200 mg) by mouth 2 times daily as needed (trigeminal neuralgia).    Insomnia, unspecified type  -     eszopiclone (LUNESTA) 2 MG tablet; Take 1 tablet (2 mg) by mouth nightly as needed for sleep (insomnia).    Coronary artery disease involving bypass graft of transplanted heart without angina pectoris  -     metoprolol succinate ER (TOPROL XL) 25 MG 24 hr tablet; TAKE 1/2 (ONE-HALF) TABLET BY MOUTH ONCE DAILY    Coronary artery disease involving native coronary artery of native heart without angina pectoris  -     simvastatin (ZOCOR) 40 MG tablet; Take 1 tablet (40 mg) by mouth at bedtime.    Other orders  -      Cancel: INFLUENZA HIGH DOSE, TRIVALENT, PF (FLUZONE)--he will obtain this at a local pharmacy  -     REVIEW OF HEALTH MAINTENANCE PROTOCOL ORDERS     RTC one year, sooner prn    Nadine Orozco MD        Preventive Care Visit  [facility]  Nadine Orozco MD, Internal Medicine  Oct 23, 2024        Subjective   Sammy is a 84 year old, presenting for the following:  Physical (Weird pain in neck)        10/23/2024    10:55 AM   Additional Questions   Roomed by [facility] EMT       HPI    Health Care Directive  Patient does not have a Health Care Directive:       10/18/2024   General Health   How would you rate your overall physical health? Excellent   Feel stress (tense, anxious, or unable to sleep) Not at all            10/18/2024   Nutrition   Diet: Low fat/cholesterol            10/18/2024   Exercise   Days per week of moderate/strenous exercise 5 days   Average minutes spent exercising at this level 50 min            10/18/2024   Social Factors   Frequency of gathering with friends or relatives Twice a week   Worry food won't last until get money to buy more No    No   Food not last or not have enough money for food? No    No   Do you have housing? (Housing is defined as stable permanent housing and does not include staying ouside in a car, in a tent, in an abandoned building, in an overnight shelter, or couch-surfing.) Yes    Yes   Are you worried about losing your housing? No    No   Lack of transportation? No    No   Unable to get utilities (heat,electricity)? No    No       Multiple values from one day are sorted in reverse-chronological order         10/18/2024   Fall Risk   Fallen 2 or more times in the past year? No     No    Trouble with walking or balance? No     No        Patient-reported    Multiple values from one day are sorted in reverse-chronological order          10/18/2024   Activities of Daily Living- Home Safety   Needs help with the following  daily activites None of the above   Safety concerns in the home None of the above            10/18/2024   Dental   Dentist two times every year? Yes            10/18/2024   Hearing Screening   Hearing concerns? None of the above            10/18/2024   Driving Risk Screening   Patient/family members have concerns about driving No            10/18/2024   General Alertness/Fatigue Screening   Have you been more tired than usual lately? No            10/18/2024   Urinary Incontinence Screening   Bothered by leaking urine in past 6 months No            10/18/2024   TB Screening   Were you born outside of the [place]? No            Today's PHQ-2 Score:       10/23/2024    10:44 AM   PHQ-2 (  [employer])   Q1: Little interest or pleasure in doing things 0    Q2: Feeling down, depressed or hopeless 0    PHQ-2 Score 0    Q1: Little interest or pleasure in doing things Not at all   Q2: Feeling down, depressed or hopeless Not at all   PHQ-2 Score 0       Patient-reported           10/18/2024   Substance Use   Alcohol more than 3/day or more than 7/wk Not Applicable   Do you have a current opioid prescription? No   How severe/bad is pain from 1 to 10? 0/10 (No Pain)   Do you use any other substances recreationally? No        Social History     Tobacco Use    Smoking status: Former     Current packs/day: 0.00     Average packs/day: 0.5 packs/day for 4.7 years (2.3 ttl pk-yrs)     Types: Cigarettes     Start date: 1965     Quit date: 1970     Years since quittin.5    Smokeless tobacco: Never   Substance Use Topics    Alcohol use: Not Currently     Comment: rarely - a beer a month    Drug use: No               Reviewed and updated as needed this visit by Provider                    Current providers sharing in care for this patient include:  Patient Care Team:  Nadine Alonso MD as PCP - General (Internal Medicine)  Minal Maxwell RN as Clinic Care Coordinator (Cardiology)  Tristen Menezes MD as  "Referring Physician (Emergency Medicine)  Justice Paiz MD as MD (Internal Medicine)  Della Kapoor PA-C as Physician Assistant (Physician Assistant)  Nadine Alonso MD as MD (Internal Medicine)  Natalie Porter MD as MD (Urology)  Anjelica Quarles MD as MD (Dermatology)  Gill Melendez PA-C as Physician Assistant (Physician Assistant)  Monse Booth RN as Specialty Care Coordinator (Clinical Cardiac Electrophysiology)  Nadine Alonso MD as Assigned PCP  Anjelica Quarles MD as MD (Dermatology)  Anjelica Quarles MD as Assigned Surgical Provider  Diann Lagos MD as MD (Cardiovascular Disease)    The following health maintenance items are reviewed in Epic and correct as of today:  Health Maintenance   Topic Date Due    ADVANCE CARE PLANNING  01/02/2020    INFLUENZA VACCINE (1) 09/01/2024    MEDICARE ANNUAL WELLNESS VISIT  10/25/2024    ANNUAL REVIEW OF HM ORDERS  10/25/2024    BMP  10/08/2025    LIPID  10/08/2025    FALL RISK ASSESSMENT  10/23/2025    DTAP/TDAP/TD IMMUNIZATION (3 - Td or Tdap) 11/03/2025    PHQ-2 (once per calendar year)  Completed    Pneumococcal Vaccine: 65+ Years  Completed    ZOSTER IMMUNIZATION  Completed    RSV VACCINE  Completed    COVID-19 Vaccine  Completed    HPV IMMUNIZATION  Aged Out    MENINGITIS IMMUNIZATION  Aged Out    RSV MONOCLONAL ANTIBODY  Aged Out        Objective    Exam  /79 (BP Location: Right arm, Patient Position: Sitting, Cuff Size: Adult Regular)   Pulse 68   Temp 97.4  F (36.3  C) (Oral)   Resp 16   Ht 1.803 m (5' 11\")   Wt 69.3 kg (152 lb 12.8 oz)   SpO2 99%   BMI 21.31 kg/m     Estimated body mass index is 21.31 kg/m  as calculated from the following:    Height as of this encounter: 1.803 m (5' 11\").    Weight as of this encounter: 69.3 kg (152 lb 12.8 oz).    Physical Exam  GENERAL: alert and no distress  EYES: Eyes grossly normal to inspection, PERRL and conjunctivae " and sclerae normal  HENT: ear canals and TM's normal, nose and mouth without ulcers or lesions  NECK: no adenopathy, no asymmetry, masses, or scars  RESP: lungs clear to auscultation - no rales, rhonchi or wheezes  CV: regular rate and rhythm, normal S1 S2, no S3 or S4, no murmur, click or rub, no peripheral edema  ABDOMEN: soft, nontender, no hepatosplenomegaly, no masses and bowel sounds normal  MS: no gross musculoskeletal defects noted, no edema  SKIN: no suspicious lesions or rashes  NEURO: Normal strength and tone, mentation intact and speech normal  PSYCH: mentation appears normal, affect normal/bright        10/23/2024   Mini Cog   Clock Draw Score 2 Normal   3 Item Recall 2 objects recalled   Mini Cog Total Score 4              Signed Electronically by: Nadine Orozco MD

## 2024-10-24 ENCOUNTER — ANCILLARY PROCEDURE (OUTPATIENT)
Dept: ULTRASOUND IMAGING | Facility: CLINIC | Age: 84
End: 2024-10-24
Attending: NURSE PRACTITIONER
Payer: MEDICARE

## 2024-10-24 DIAGNOSIS — N50.82 SCROTAL PAIN: ICD-10-CM

## 2024-10-24 PROCEDURE — 76870 US EXAM SCROTUM: CPT | Mod: GC | Performed by: RADIOLOGY

## 2024-10-24 PROCEDURE — 99207 US TESTICULAR AND SCROTUM WITH DOPPLER LIMITED: CPT | Mod: GC | Performed by: RADIOLOGY

## 2024-10-31 ENCOUNTER — MYC MEDICAL ADVICE (OUTPATIENT)
Dept: INTERNAL MEDICINE | Facility: CLINIC | Age: 84
End: 2024-10-31
Payer: MEDICARE

## 2024-11-01 DIAGNOSIS — I21.4 NSTEMI (NON-ST ELEVATED MYOCARDIAL INFARCTION) (H): Primary | ICD-10-CM

## 2024-11-06 RX ORDER — NITROGLYCERIN 0.4 MG/1
0.4 TABLET SUBLINGUAL EVERY 5 MIN PRN
Qty: 30 TABLET | Refills: 1 | Status: SHIPPED | OUTPATIENT
Start: 2024-11-06

## 2024-11-06 NOTE — TELEPHONE ENCOUNTER
Last Clinic Visit: [facility] Internal Medicine  10/23/24  NV: 10/24/25  Always fail criteria reviewed

## 2024-12-02 ENCOUNTER — OFFICE VISIT (OUTPATIENT)
Dept: DERMATOLOGY | Facility: CLINIC | Age: 84
End: 2024-12-02
Attending: DERMATOLOGY
Payer: MEDICARE

## 2024-12-02 DIAGNOSIS — L57.0 ACTINIC KERATOSIS: Primary | ICD-10-CM

## 2024-12-02 DIAGNOSIS — L82.1 SEBORRHEIC KERATOSES: ICD-10-CM

## 2024-12-02 DIAGNOSIS — Z85.828 HISTORY OF NONMELANOMA SKIN CANCER: ICD-10-CM

## 2024-12-02 DIAGNOSIS — D18.01 CHERRY ANGIOMA: ICD-10-CM

## 2024-12-02 DIAGNOSIS — D22.9 MULTIPLE NEVI: ICD-10-CM

## 2024-12-02 DIAGNOSIS — L81.4 LENTIGO: ICD-10-CM

## 2024-12-02 PROCEDURE — 17003 DESTRUCT PREMALG LES 2-14: CPT | Performed by: DERMATOLOGY

## 2024-12-02 PROCEDURE — 17000 DESTRUCT PREMALG LESION: CPT | Performed by: DERMATOLOGY

## 2024-12-02 PROCEDURE — 99213 OFFICE O/P EST LOW 20 MIN: CPT | Mod: 25 | Performed by: DERMATOLOGY

## 2024-12-02 ASSESSMENT — PAIN SCALES - GENERAL: PAINLEVEL_OUTOF10: NO PAIN (0)

## 2024-12-02 NOTE — NURSING NOTE
Dermatology Rooming Note    Jacinto Flannery's goals for this visit include:   Chief Complaint   Patient presents with    Skin Check     Just one spot he is concerned about.      Roderick Boo, EMT  Clinic Support  [facility]     [phone]    Employed by [facility]

## 2024-12-02 NOTE — LETTER
12/2/2024       RE: Jacinto Flannery  [address]  [facility] [id number]     Dear Colleague,    Thank you for referring your patient, Jacinto Flannery, to the [facility] DERMATOLOGY CLINIC [employer] [facility]. Please see a copy of my visit note below.    [facility] Dermatology Note  Encounter Date: Dec 2, 2024  Office Visit     Dermatology Problem List:  # NMSC  - SCC, left (dorsal) forearm, s/p excision 8/18/2015  - SCC, right dorsal hand, s/p Mohs 3/18/2015  # Compound nevus with moderate dysplasia, mid upper back   - s/p biopsy 10/16/2015  # Actinic keratosis  - s/p cryotherapy 07/20/2016, 03/06/19, 04/12/2023  - efudex in November 2016 (used for 19 days), pt had a very robust response  - pt given hydrocortisone 2.5% cream BID PRN due to severe response and told to stop efudex  # Unknown skin eruption penis. Resolved 7/5/17  - s/p triamcinolone cream, nystatin cream, mupirocin, and valtrex  - s/p biopsy showing hemorraghic scale crust and ulceration 2/3/2015  # Drug exanthem, 2/2 to tamsulosin  - s/p biopsy showing interface dermatitis 12/29/2014  - s/p lidex initiated 1/8/2015  - s/p clobetasol initiated 12/29/2014-improved  # Traumatic tattoo  - Central forehead, measured 1X1.5cm on 10/16/17  # Rosacea  - Current Tx: Metronidazole cream     ___________________________________    ____________________________________________    Assessment & Plan:     # AK x 2 left cheek.   - Cryotherapy performed today (see procedure note(s) below).    # History of skin cancer- NERD    # Benign findings: seborrheic keratoses, cherry angiomas, lentigo and nevi    Procedures Performed:   - Cryotherapy procedure note, location(s): left cheek. After verbal consent and discussion of risks and benefits including, but not limited to, dyspigmentation/scar, blister, and pain, 2 lesion(s) was(were) treated with 1-2 mm freeze border for 1-2 cycles with  liquid nitrogen. Post cryotherapy instructions were provided.      Follow-up: 6 month(s) in-person, or earlier for new or changing lesions    Staff:     Anjelica Quarles MD  ____________________________________________    CC: Skin Check (Just one spot he is concerned about. )    HPI:  Mr. Jacinto Flannery is a(n) 84 year old male who presents today as a return patient for a skin check.  Did have a sore on his left cheek but now resolved.  No other tender or bleeding lesions    Patient is otherwise feeling well, without additional skin concerns.     Labs Reviewed:  N/A    Physical Exam:  Vitals: There were no vitals taken for this visit.  SKIN: Total skin excluding the undergarment areas was performed. The exam included the head/face, neck, both arms, chest, back, abdomen, both legs, digits and/or nails.   - 2 red gritty papules left cheek  - scars with NERD  - cherry angiomas  - nevi with no atypia  - waxy stuck on papules  - lentigo     - No other lesions of concern on areas examined.     Medications:  Current Outpatient Medications   Medication Sig Dispense Refill     alfuzosin ER (UROXATRAL) 10 MG 24 hr tablet Take 1 tablet (10 mg) by mouth daily. 90 tablet 3     aspirin 81 MG tablet Take 81 mg by mouth daily       carBAMazepine (CARBATROL) 200 MG 12 hr capsule Take 1 capsule (200 mg) by mouth 2 times daily as needed (trigeminal neuralgia). 30 capsule 3     eszopiclone (LUNESTA) 2 MG tablet Take 1 tablet (2 mg) by mouth nightly as needed for sleep (insomnia). 15 tablet 1     melatonin 1 MG CAPS        metoprolol succinate ER (TOPROL XL) 25 MG 24 hr tablet TAKE 1/2 (ONE-HALF) TABLET BY MOUTH ONCE DAILY 45 tablet 3     metroNIDAZOLE (METROCREAM) 0.75 % external cream Apply topically 2 times daily 45 g 11     nitroGLYcerin (NITROSTAT) 0.4 MG sublingual tablet Place 1 tablet (0.4 mg) under the tongue every 5 minutes as needed for chest pain. For chest pain place 1 tablet under the tongue every 5 minutes for 3 doses.  If symptoms persist 5 minutes after 1st dose call 911. 30 tablet 1     simvastatin (ZOCOR) 40 MG tablet Take 1 tablet (40 mg) by mouth at bedtime. 90 tablet 3     valACYclovir (VALTREX) 1000 mg tablet Take 1 pill twice a day for 3 days at the first sign of a cold core. 6 tablet 3     Current Facility-Administered Medications   Medication Dose Route Frequency Provider Last Rate Last Admin     lidocaine 1% with EPINEPHrine 1:100,000 injection 0.5 mL  0.5 mL Intradermal Once Anjelica Quarles MD          Past Medical History:   Patient Active Problem List   Diagnosis     CAD S/P percutaneous coronary angioplasty     CAD (coronary artery disease)     BPH (benign prostatic hyperplasia)     Hyperlipidemia with target LDL less than 70     S/P coronary artery stent placement     GERD (gastroesophageal reflux disease)     S/P TURP     Dermatitis     AK (actinic keratosis)     SCC (squamous cell carcinoma)     SK (seborrheic keratosis)     EIC (epidermal inclusion cyst)     History of squamous cell carcinoma     Seborrheic keratosis     Inflamed seborrheic keratosis     History of dysplastic nevus     Varicose veins     Medication reaction, initial encounter     HSV (herpes simplex virus) infection     Hypertension     Insomnia     Thrombocytopenia (H)     Benign prostatic hyperplasia with urinary obstruction     Hypertrophy of prostate without urinary obstruction and other lower urinary tract symptoms (LUTS)     Atherosclerosis of coronary artery     Recurrent coronary arteriosclerosis after percutaneous transluminal coronary angioplasty     History of atypical nevus     Hyperlipidemia     History of transurethral resection of prostate     Presence of coronary angioplasty implant and graft     Squamous cell carcinoma of skin     Varicose veins of other specified sites     Past Medical History:   Diagnosis Date     BPH (benign prostatic hyperplasia)      CAD (coronary artery disease)     MI 1989, CABG 1990, 11/2014 PCI  to LM, LCx and RCA     Conductive hearing loss Unknown.     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     Skin disease      Squamous cell carcinoma        CC Anjelica Quarles MD  [address] [place] [address] [address]  [place] [address] on close of this encounter.       Again, thank you for allowing me to participate in the care of your patient.      Sincerely,    Anjelica Quarles MD

## 2024-12-02 NOTE — PATIENT INSTRUCTIONS
Cryotherapy    What is it?  Use of a very cold liquid, such as liquid nitrogen, to freeze and destroy abnormal skin cells that need to be removed    What should I expect?  Tenderness and redness  A small blister that might grow and fill with dark purple blood. There may be crusting.  More than one treatment may be needed if the lesions do not go away.    How do I care for the treated area?  Gently wash the area with your hands when bathing.  Use a thin layer of Vaseline to help with healing. You may use a Band-Aid.   The area should heal within 7-10 days and may leave behind a pink or lighter color.   Do not use an antibiotic or Neosporin ointment.   You may take acetaminophen (Tylenol) for pain.     Call your doctor if you have:  Severe pain  Signs of infection (warmth, redness, cloudy yellow drainage, and or a bad smell)  Questions or concerns    Who should I call with questions?      [facility]: [phone]      [facility]: [phone]      For urgent needs outside of business hours call the [facility] at [phone] and ask for the dermatology resident on call

## 2024-12-02 NOTE — PROGRESS NOTES
[facility] Dermatology Note  Encounter Date: Dec 2, 2024  Office Visit     Dermatology Problem List:  # NMSC  - SCC, left (dorsal) forearm, s/p excision 8/18/2015  - SCC, right dorsal hand, s/p Mohs 3/18/2015  # Compound nevus with moderate dysplasia, mid upper back   - s/p biopsy 10/16/2015  # Actinic keratosis  - s/p cryotherapy 07/20/2016, 03/06/19, 04/12/2023  - efudex in November 2016 (used for 19 days), pt had a very robust response  - pt given hydrocortisone 2.5% cream BID PRN due to severe response and told to stop efudex  # Unknown skin eruption penis. Resolved 7/5/17  - s/p triamcinolone cream, nystatin cream, mupirocin, and valtrex  - s/p biopsy showing hemorraghic scale crust and ulceration 2/3/2015  # Drug exanthem, 2/2 to tamsulosin  - s/p biopsy showing interface dermatitis 12/29/2014  - s/p lidex initiated 1/8/2015  - s/p clobetasol initiated 12/29/2014-improved  # Traumatic tattoo  - Central forehead, measured 1X1.5cm on 10/16/17  # Rosacea  - Current Tx: Metronidazole cream     ___________________________________    ____________________________________________    Assessment & Plan:     # AK x 2 left cheek.   - Cryotherapy performed today (see procedure note(s) below).    # History of skin cancer- NERD    # Benign findings: seborrheic keratoses, cherry angiomas, lentigo and nevi    Procedures Performed:   - Cryotherapy procedure note, location(s): left cheek. After verbal consent and discussion of risks and benefits including, but not limited to, dyspigmentation/scar, blister, and pain, 2 lesion(s) was(were) treated with 1-2 mm freeze border for 1-2 cycles with liquid nitrogen. Post cryotherapy instructions were provided.      Follow-up: 6 month(s) in-person, or earlier for new or changing lesions    Staff:     Anjelica Quarles MD  ____________________________________________    CC: Skin Check (Just one spot he is concerned about. )    HPI:  Mr. Jacinto Flannery is a(n) 84 year  old male who presents today as a return patient for a skin check.  Did have a sore on his left cheek but now resolved.  No other tender or bleeding lesions    Patient is otherwise feeling well, without additional skin concerns.     Labs Reviewed:  N/A    Physical Exam:  Vitals: There were no vitals taken for this visit.  SKIN: Total skin excluding the undergarment areas was performed. The exam included the head/face, neck, both arms, chest, back, abdomen, both legs, digits and/or nails.   - 2 red gritty papules left cheek  - scars with NERD  - cherry angiomas  - nevi with no atypia  - waxy stuck on papules  - lentigo     - No other lesions of concern on areas examined.     Medications:  Current Outpatient Medications   Medication Sig Dispense Refill    alfuzosin ER (UROXATRAL) 10 MG 24 hr tablet Take 1 tablet (10 mg) by mouth daily. 90 tablet 3    aspirin 81 MG tablet Take 81 mg by mouth daily      carBAMazepine (CARBATROL) 200 MG 12 hr capsule Take 1 capsule (200 mg) by mouth 2 times daily as needed (trigeminal neuralgia). 30 capsule 3    eszopiclone (LUNESTA) 2 MG tablet Take 1 tablet (2 mg) by mouth nightly as needed for sleep (insomnia). 15 tablet 1    melatonin 1 MG CAPS       metoprolol succinate ER (TOPROL XL) 25 MG 24 hr tablet TAKE 1/2 (ONE-HALF) TABLET BY MOUTH ONCE DAILY 45 tablet 3    metroNIDAZOLE (METROCREAM) 0.75 % external cream Apply topically 2 times daily 45 g 11    nitroGLYcerin (NITROSTAT) 0.4 MG sublingual tablet Place 1 tablet (0.4 mg) under the tongue every 5 minutes as needed for chest pain. For chest pain place 1 tablet under the tongue every 5 minutes for 3 doses. If symptoms persist 5 minutes after 1st dose call 911. 30 tablet 1    simvastatin (ZOCOR) 40 MG tablet Take 1 tablet (40 mg) by mouth at bedtime. 90 tablet 3    valACYclovir (VALTREX) 1000 mg tablet Take 1 pill twice a day for 3 days at the first sign of a cold core. 6 tablet 3     Current Facility-Administered Medications    Medication Dose Route Frequency Provider Last Rate Last Admin    lidocaine 1% with EPINEPHrine 1:100,000 injection 0.5 mL  0.5 mL Intradermal Once Anjelica Quarles MD          Past Medical History:   Patient Active Problem List   Diagnosis    CAD S/P percutaneous coronary angioplasty    CAD (coronary artery disease)    BPH (benign prostatic hyperplasia)    Hyperlipidemia with target LDL less than 70    S/P coronary artery stent placement    GERD (gastroesophageal reflux disease)    S/P TURP    Dermatitis    AK (actinic keratosis)    SCC (squamous cell carcinoma)    SK (seborrheic keratosis)    EIC (epidermal inclusion cyst)    History of squamous cell carcinoma    Seborrheic keratosis    Inflamed seborrheic keratosis    History of dysplastic nevus    Varicose veins    Medication reaction, initial encounter    HSV (herpes simplex virus) infection    Hypertension    Insomnia    Thrombocytopenia (H)    Benign prostatic hyperplasia with urinary obstruction    Hypertrophy of prostate without urinary obstruction and other lower urinary tract symptoms (LUTS)    Atherosclerosis of coronary artery    Recurrent coronary arteriosclerosis after percutaneous transluminal coronary angioplasty    History of atypical nevus    Hyperlipidemia    History of transurethral resection of prostate    Presence of coronary angioplasty implant and graft    Squamous cell carcinoma of skin    Varicose veins of other specified sites     Past Medical History:   Diagnosis Date    BPH (benign prostatic hyperplasia)     CAD (coronary artery disease)     MI 1989, CABG 1990, 11/2014 PCI to LM, LCx and RCA    Conductive hearing loss Unknown.    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    Skin disease     Squamous cell carcinoma        CC Anjelica Quarles MD  [address] [facility] [address]  [place] [address] on close of this encounter.

## 2025-01-22 NOTE — PROGRESS NOTES
[place] [place]: Mr. Jacinto Flannery is a 82 year old  male with PMH significant for    -Coronary artery disease, status post bypass surgery with a single SVG sequential graft to LAD and diagonal in 1990; PCI of RCA, left main, and left circumflex in November 2014.  Most recent angiogram 4/1/2016 when he presented with recurrent chest pain:  Left main: Stented from the ostium into proximal left circumflex stent widely patent  LAD: Small to medium in size  Left circumflex: Small and nondominant, widely patent stent present, diffuse 50% after the stent  RCA: Large dominant system, widely patent stent from the ostium to the mid segment  SVG sequential graft to the LAD and D1: Widely patent  -Squamous cell carcinoma skin  - Hypertension    Patient was last seen in cardiology clinic by my colleague Dr. Akins.  He reported doing well at that time  Patient is being seen today for 2-year follow-up.     He tells me that he has been doing well over the 2 years.  He is physically very active.  Denies exertional chest pain.  Feels mildly short of breath when he climbs incline and reaches the top.  Shortness of breath goes away quick.  Does not feel short of breath with any other physical activity.  Does not feel palpitations, no dizziness, syncope or lower extremity edema.    He does not smoke.  Reports well-controlled blood pressure at home.  Compliant with medications.    Medications:   Aspirin 81 mg daily  Metoprolol succinate 12.5 mg daily  Simvastatin 40 mg nightly    Medications, personal, family, and social history reviewed with patient and revised.    Interval history 1/23/2024:  Patient is being seen today for 2-year visit.  He is overall doing well.  Stays active.  No concerning cardiac symptoms at this time.    PAST MEDICAL HISTORY:  Past Medical History:   Diagnosis Date    BPH (benign prostatic hyperplasia)     CAD (coronary artery disease)     MI 1989, CABG 1990, 11/2014 PCI to LM, LCx  and RCA    Conductive hearing loss Unknown.    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    Skin disease     Squamous cell carcinoma        CURRENT MEDICATIONS:  Current Outpatient Medications   Medication Sig Dispense Refill    alfuzosin ER (UROXATRAL) 10 MG 24 hr tablet Take 1 tablet (10 mg) by mouth daily. 90 tablet 3    aspirin 81 MG tablet Take 81 mg by mouth daily      carBAMazepine (CARBATROL) 200 MG 12 hr capsule Take 1 capsule (200 mg) by mouth 2 times daily as needed (trigeminal neuralgia). 30 capsule 3    eszopiclone (LUNESTA) 2 MG tablet Take 1 tablet (2 mg) by mouth nightly as needed for sleep (insomnia). 15 tablet 1    melatonin 1 MG CAPS       metoprolol succinate ER (TOPROL XL) 25 MG 24 hr tablet TAKE 1/2 (ONE-HALF) TABLET BY MOUTH ONCE DAILY 45 tablet 3    metroNIDAZOLE (METROCREAM) 0.75 % external cream Apply topically 2 times daily 45 g 11    nitroGLYcerin (NITROSTAT) 0.4 MG sublingual tablet Place 1 tablet (0.4 mg) under the tongue every 5 minutes as needed for chest pain. For chest pain place 1 tablet under the tongue every 5 minutes for 3 doses. If symptoms persist 5 minutes after 1st dose call 911. 30 tablet 1    simvastatin (ZOCOR) 40 MG tablet Take 1 tablet (40 mg) by mouth at bedtime. 90 tablet 3    valACYclovir (VALTREX) 1000 mg tablet Take 1 pill twice a day for 3 days at the first sign of a cold core. 6 tablet 3       PAST SURGICAL HISTORY:  Past Surgical History:   Procedure Laterality Date    APPENDECTOMY  1960    CHOLECYSTECTOMY  1992    in [place]    ENDOSCOPY  04/26/2013    Done at [facility] in [place], [place]     GENITOURINARY SURGERY      HEART CATH STENT COR W/WO PTCA  11/12/2014    x 4 ANKUR's (two ostial to mid-RCA, one ostial LCx and mid-LM, one ostial LAD)    HERNIA REPAIR Right 2010    in [place]    LASER KTP GREEN LIGHT PHOTOSELECTIVE VAPORIZATION PROSTATE N/A 2015    Procedure: LASER KTP GREEN LIGHT PHOTOSELECTIVE VAPORIZATION PROSTATE;  Surgeon: Natalie Porter MD;  Location: [facility] OR    MOHS MICROGRAPHIC PROCEDURE      [facility] CABG, VEIN, THREE      SVG-LAD in [place]       ALLERGIES:     Allergies   Allergen Reactions    Macrodantin [Nitrofurantoin] Rash    Sulfa Antibiotics Rash    Tamsulosin Rash       FAMILY HISTORY:  Family History   Problem Relation Age of Onset    Cancer Father         Stomach    Other Cancer Father     Heart Disease Mother     Coronary Artery Disease Mother     Hyperlipidemia Mother     Heart Disease Maternal Grandmother     Skin Cancer No family hx of     Melanoma No family hx of          SOCIAL HISTORY:  Social History     Tobacco Use    Smoking status: Former     Current packs/day: 0.00     Average packs/day: 0.5 packs/day for 4.7 years (2.3 ttl pk-yrs)     Types: Cigarettes     Start date: 1965     Quit date: 1970     Years since quittin.7    Smokeless tobacco: Never   Substance Use Topics    Alcohol use: Not Currently     Comment: rarely - a beer a month    Drug use: No       ROS:   Constitutional: No fever, chills, or sweats. Weight stable.   Cardiovascular: As per HPI.       Exam:  /82 (BP Location: Right arm, Patient Position: Chair, Cuff Size: Adult Regular)   Pulse 65   Wt 72.3 kg (159 lb 8 oz)   SpO2 99%   BMI 22.25 kg/m    GENERAL APPEARANCE: alert and no distress  HEENT: no icterus, no central cyanosis  LYMPH/NECK: no adenopathy, no asymmetry, JVP not elevated, no carotid bruits.  RESPIRATORY: lungs clear to auscultation - no rales, rhonchi or wheezes, no use of accessory muscles, no retractions, respirations are unlabored, normal  respiratory rate  CARDIOVASCULAR: regular rhythm, normal S1, S2, no S3 or S4 and no murmur, click or rub, precordium quiet with normal PMI.  EXTREMITIES: no edema  NEURO: alert, normal speech,and affect  SKIN: no ecchymoses, no rashes     I have reviewed the labs and personally reviewed the imaging below and made my comment in the assessment and plan.    Labs:  CBC RESULTS:   Lab Results   Component Value Date    WBC 5.5 04/01/2016    RBC 4.64 04/01/2016    HGB 14.1 04/01/2016    HCT 41.6 04/01/2016    MCV 90 04/01/2016    MCH 30.4 04/01/2016    MCHC 33.9 04/01/2016    RDW 13.2 04/01/2016     (L) 04/01/2016       BMP RESULTS:  Lab Results   Component Value Date     10/08/2024     12/22/2020    POTASSIUM 4.5 10/08/2024    POTASSIUM 4.2 10/11/2021    POTASSIUM 4.0 12/22/2020    CHLORIDE 103 10/08/2024    CHLORIDE 109 10/11/2021    CHLORIDE 106 12/22/2020    CO2 29 10/08/2024    CO2 28 10/11/2021    CO2 29 12/22/2020    ANIONGAP 8 10/08/2024    ANIONGAP 6 10/11/2021    ANIONGAP 6 12/22/2020    GLC 95 10/08/2024    GLC 92 10/11/2021    GLC 90 12/22/2020    BUN 17.8 10/08/2024    BUN 16 10/11/2021    BUN 15 12/22/2020    CR 0.97 10/08/2024    CR 0.89 12/22/2020    GFRESTIMATED 78 10/08/2024    GFRESTIMATED 82 12/22/2020    GFRESTBLACK >90 12/22/2020    JAMIE 9.5 10/08/2024    JAMIE 9.2 12/22/2020            Echocardiogram 2014  The Ejection Fraction is estimated at 55-60%. Cannot exclude basal inferior   hypokinesis. No pericardial effusion is present.  Global right ventricular function is normal.  Mild to moderate right ventricular dilation is present.    EKG 1/19/2021 shows sinus rhythm with nonsustained SVT          Assessment and Plan:     1. CAD s/p CABG.   -Physically very active.  No concerning cardiac symptoms at this time.  Recommend to continue current treatment.      2. Hypertension.  -Normal blood pressure in clinic and at home.      3. PACs and nonsustained SVT (during my physical exam today I  was able to detect a few nonsustained SVT by pulse exam.  He was completely asymptomatic)  - Asymptomatic.   -Zio patch for 2 weeks     Plan:  Continue all current meds with no change.   Recommend to continue his current physical activity.    Follow-up in cardiology in 2 years or earlier as needed    Total time spent today for this visit is 21 minutes including precharting, face-to-face clinic visit, review of labs/imaging and medical documentation.      Diann CALZADA MD  [facility] Division of Cardiology  Pager [phone]

## 2025-01-23 ENCOUNTER — OFFICE VISIT (OUTPATIENT)
Dept: CARDIOLOGY | Facility: CLINIC | Age: 85
End: 2025-01-23
Attending: INTERNAL MEDICINE
Payer: MEDICARE

## 2025-01-23 VITALS
BODY MASS INDEX: 22.25 KG/M2 | DIASTOLIC BLOOD PRESSURE: 82 MMHG | HEART RATE: 65 BPM | OXYGEN SATURATION: 99 % | SYSTOLIC BLOOD PRESSURE: 124 MMHG | WEIGHT: 159.5 LBS

## 2025-01-23 DIAGNOSIS — Z95.1 S/P CABG (CORONARY ARTERY BYPASS GRAFT): Primary | ICD-10-CM

## 2025-01-23 DIAGNOSIS — I49.1 PAC (PREMATURE ATRIAL CONTRACTION): ICD-10-CM

## 2025-01-23 PROCEDURE — G0463 HOSPITAL OUTPT CLINIC VISIT: HCPCS | Performed by: INTERNAL MEDICINE

## 2025-01-23 PROCEDURE — 93246 EXT ECG>7D<15D RECORDING: CPT | Performed by: INTERNAL MEDICINE

## 2025-01-23 ASSESSMENT — PAIN SCALES - GENERAL: PAINLEVEL_OUTOF10: NO PAIN (0)

## 2025-01-23 NOTE — NURSING NOTE
Jacinto Flannery arrived here on 1/23/2025 12:42 PM for 8-14 Days  Zio monitor placement per ordering provider Dr. Lagos for the diagnosis of palpitations.  Dr. Lopez is the supervising MD. Patient s skin was prepped per protocol.  Zio monitor was placed.  Instructions were reviewed with and given to the patient.  Patient verbalized understanding of wear, troubleshooting and monitor return instructions.

## 2025-01-23 NOTE — PATIENT INSTRUCTIONS
You were seen in the Electrophysiology Clinic today by: Dr. Lagos    Plan:   Medication Changes: None.     Labs/Tests Needed: 14 day zio patch placed today in clinic. Dr. Lagos will update you with results via [employer] or telephone.     Follow up Visit: with Dr. Lagos in 2 years or sooner if needed.     If you have further questions, please utilize [employer] to contact us.     Your Care Team:    [facility] Cardiology   Telephone Number     Nurse Line  Lm Higuera RN    [phone]     For scheduling appointments:   Marija   [phone]   For procedure scheduling:    Lani Stanley     [phone]   For the Device Clinic (Pacemakers, ICDs, Loop Recorders)    During business hours: [phone]  After business hours:   [phone]- select option 4 and ask for job code [id number].       On-call cardiologist for after hours or on weekends:   [phone], option #4, and ask to speak to the on-call cardiologist.     Cardiovascular Clinic:   [address] [place] [address]. [place] [address]      As always, Thank you for trusting us with your health care needs!

## 2025-01-23 NOTE — NURSING NOTE
Chief Complaint   Patient presents with    Follow Up     Return cardiology for 2 year follow-up d/t CAD s/p CABG, HTN, HLD, PAC's, SVT       Vitals were taken and medications reconciled.    Monty Barone, CHZA  11:22 AM

## 2025-01-23 NOTE — LETTER
1/23/2025      RE: Jacinto Flannery  [address]  [facility] [address]       Dear Colleague,    Thank you for the opportunity to participate in the care of your patient, Jacinto Flannery, at the [facility] HEART [facility]. Please see a copy of my visit note below.           [facility]      HPI: Mr. Jacinto Flannery is a 82 year old  male with PMH significant for    -Coronary artery disease, status post bypass surgery with a single SVG sequential graft to LAD and diagonal in 1990; PCI of RCA, left main, and left circumflex in November 2014.  Most recent angiogram 4/1/2016 when he presented with recurrent chest pain:  Left main: Stented from the ostium into proximal left circumflex stent widely patent  LAD: Small to medium in size  Left circumflex: Small and nondominant, widely patent stent present, diffuse 50% after the stent  RCA: Large dominant system, widely patent stent from the ostium to the mid segment  SVG sequential graft to the LAD and D1: Widely patent  -Squamous cell carcinoma skin  - Hypertension    Patient was last seen in cardiology clinic by my colleague Dr. Akins.  He reported doing well at that time  Patient is being seen today for 2-year follow-up.     He tells me that he has been doing well over the 2 years.  He is physically very active.  Denies exertional chest pain.  Feels mildly short of breath when he climbs incline and reaches the top.  Shortness of breath goes away quick.  Does not feel short of breath with any other physical activity.  Does not feel palpitations, no dizziness, syncope or lower extremity edema.    He does not smoke.  Reports well-controlled blood pressure at home.  Compliant with medications.    Medications:   Aspirin 81 mg daily  Metoprolol succinate 12.5 mg daily  Simvastatin 40 mg nightly    Medications, personal, family, and social history reviewed with patient and  revised.    Interval history 1/23/2024:  Patient is being seen today for 2-year visit.  He is overall doing well.  Stays active.  No concerning cardiac symptoms at this time.    PAST MEDICAL HISTORY:  Past Medical History:   Diagnosis Date     BPH (benign prostatic hyperplasia)      CAD (coronary artery disease)     MI 1989, CABG 1990, 11/2014 PCI to LM, LCx and RCA     Conductive hearing loss Unknown.     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     Skin disease      Squamous cell carcinoma        CURRENT MEDICATIONS:  Current Outpatient Medications   Medication Sig Dispense Refill     alfuzosin ER (UROXATRAL) 10 MG 24 hr tablet Take 1 tablet (10 mg) by mouth daily. 90 tablet 3     aspirin 81 MG tablet Take 81 mg by mouth daily       carBAMazepine (CARBATROL) 200 MG 12 hr capsule Take 1 capsule (200 mg) by mouth 2 times daily as needed (trigeminal neuralgia). 30 capsule 3     eszopiclone (LUNESTA) 2 MG tablet Take 1 tablet (2 mg) by mouth nightly as needed for sleep (insomnia). 15 tablet 1     melatonin 1 MG CAPS        metoprolol succinate ER (TOPROL XL) 25 MG 24 hr tablet TAKE 1/2 (ONE-HALF) TABLET BY MOUTH ONCE DAILY 45 tablet 3     metroNIDAZOLE (METROCREAM) 0.75 % external cream Apply topically 2 times daily 45 g 11     nitroGLYcerin (NITROSTAT) 0.4 MG sublingual tablet Place 1 tablet (0.4 mg) under the tongue every 5 minutes as needed for chest pain. For chest pain place 1 tablet under the tongue every 5 minutes for 3 doses. If symptoms persist 5 minutes after 1st dose call 911. 30 tablet 1     simvastatin (ZOCOR) 40 MG tablet Take 1  tablet (40 mg) by mouth at bedtime. 90 tablet 3     valACYclovir (VALTREX) 1000 mg tablet Take 1 pill twice a day for 3 days at the first sign of a cold core. 6 tablet 3       PAST SURGICAL HISTORY:  Past Surgical History:   Procedure Laterality Date     APPENDECTOMY  1960     CHOLECYSTECTOMY  1992    in [place]     ENDOSCOPY  2013    Done at [facility] in [place], [place]     GENITOURINARY SURGERY       HEART CATH STENT COR W/WO PTCA  11/12/2014    x 4 ANKUR's (two ostial to mid-RCA, one ostial LCx and mid-LM, one ostial LAD)     HERNIA REPAIR Right     in [place]     LASER KTP GREEN LIGHT PHOTOSELECTIVE VAPORIZATION PROSTATE N/A 2015    Procedure: LASER KTP GREEN LIGHT PHOTOSELECTIVE VAPORIZATION PROSTATE;  Surgeon: Natalie Porter MD;  Location: [facility] OR     MOHS MICROGRAPHIC PROCEDURE       [facility] CABG, VEIN, THREE      SVG-LAD in [place]       ALLERGIES:     Allergies   Allergen Reactions     Macrodantin [Nitrofurantoin] Rash     Sulfa Antibiotics Rash     Tamsulosin Rash       FAMILY HISTORY:  Family History   Problem Relation Age of Onset     Cancer Father         Stomach     Other Cancer Father      Heart Disease Mother      Coronary Artery Disease Mother      Hyperlipidemia Mother      Heart Disease Maternal Grandmother      Skin Cancer No family hx of      Melanoma No family hx of          SOCIAL HISTORY:  Social History     Tobacco Use     Smoking status: Former     Current packs/day: 0.00     Average packs/day: 0.5 packs/day for 4.7 years (2.3 ttl pk-yrs)     Types: Cigarettes     Start date: 1965     Quit date: 1970     Years since quittin.7     Smokeless tobacco: Never   Substance Use Topics     Alcohol use: Not Currently     Comment: rarely - a beer a month     Drug use: No       ROS:   Constitutional: No fever, chills, or sweats. Weight stable.   Cardiovascular: As per HPI.       Exam:  /82 (BP Location: Right arm, Patient Position: Chair, Cuff Size:  Adult Regular)   Pulse 65   Wt 72.3 kg (159 lb 8 oz)   SpO2 99%   BMI 22.25 kg/m    GENERAL APPEARANCE: alert and no distress  HEENT: no icterus, no central cyanosis  LYMPH/NECK: no adenopathy, no asymmetry, JVP not elevated, no carotid bruits.  RESPIRATORY: lungs clear to auscultation - no rales, rhonchi or wheezes, no use of accessory muscles, no retractions, respirations are unlabored, normal respiratory rate  CARDIOVASCULAR: regular rhythm, normal S1, S2, no S3 or S4 and no murmur, click or rub, precordium quiet with normal PMI.  EXTREMITIES: no edema  NEURO: alert, normal speech,and affect  SKIN: no ecchymoses, no rashes     I have reviewed the labs and personally reviewed the imaging below and made my comment in the assessment and plan.    Labs:  CBC RESULTS:   Lab Results   Component Value Date    WBC 5.5 04/01/2016    RBC 4.64 04/01/2016    HGB 14.1 04/01/2016    HCT 41.6 04/01/2016    MCV 90 04/01/2016    MCH 30.4 04/01/2016    MCHC 33.9 04/01/2016    RDW 13.2 04/01/2016     (L) 04/01/2016       BMP RESULTS:  Lab Results   Component Value Date     10/08/2024     12/22/2020    POTASSIUM 4.5 10/08/2024    POTASSIUM 4.2 10/11/2021    POTASSIUM 4.0 12/22/2020    CHLORIDE 103 10/08/2024    CHLORIDE 109 10/11/2021    CHLORIDE 106 12/22/2020    CO2 29 10/08/2024    CO2 28 10/11/2021    CO2 29 12/22/2020    ANIONGAP 8 10/08/2024    ANIONGAP 6 10/11/2021    ANIONGAP 6 12/22/2020    GLC 95 10/08/2024    GLC 92 10/11/2021    GLC 90 12/22/2020    BUN 17.8 10/08/2024    BUN 16 10/11/2021    BUN 15 12/22/2020    CR 0.97 10/08/2024    CR 0.89 12/22/2020    GFRESTIMATED 78 10/08/2024    GFRESTIMATED 82 12/22/2020    GFRESTBLACK >90 12/22/2020    JAMIE 9.5 10/08/2024    JAMIE 9.2 12/22/2020            Echocardiogram 2014  The Ejection Fraction is estimated at 55-60%. Cannot exclude basal inferior   hypokinesis. No pericardial effusion is present.  Global right ventricular function is normal.  Mild to  moderate right ventricular dilation is present.    EKG 1/19/2021 shows sinus rhythm with nonsustained SVT          Assessment and Plan:     1. CAD s/p CABG.   -Physically very active.  No concerning cardiac symptoms at this time.  Recommend to continue current treatment.      2. Hypertension.  -Normal blood pressure in clinic and at home.      3. PACs and nonsustained SVT (during my physical exam today I was able to detect a few nonsustained SVT by pulse exam.  He was completely asymptomatic)  - Asymptomatic.   -Zio patch for 2 weeks     Plan:  Continue all current meds with no change.   Recommend to continue his current physical activity.    Follow-up in cardiology in 2 years or earlier as needed    Total time spent today for this visit is 21 minutes including precharting, face-to-face clinic visit, review of labs/imaging and medical documentation.      Diann CALZADA MD  [facility] Division of Cardiology  Pager [phone]      Please do not hesitate to contact me if you have any questions/concerns.     Sincerely,     Diann Calzada MD

## 2025-02-12 LAB — CV ZIO PRELIM RESULTS: NORMAL

## 2025-02-22 LAB — CV ZIO PRELIM RESULTS: NORMAL

## 2025-06-02 ENCOUNTER — OFFICE VISIT (OUTPATIENT)
Dept: DERMATOLOGY | Facility: CLINIC | Age: 85
End: 2025-06-02
Attending: DERMATOLOGY
Payer: MEDICARE

## 2025-06-02 DIAGNOSIS — Z85.828 HISTORY OF SKIN CANCER: ICD-10-CM

## 2025-06-02 DIAGNOSIS — L57.0 ACTINIC KERATOSIS: ICD-10-CM

## 2025-06-02 DIAGNOSIS — L82.1 SEBORRHEIC KERATOSES: ICD-10-CM

## 2025-06-02 DIAGNOSIS — D22.9 MULTIPLE BENIGN NEVI: Primary | ICD-10-CM

## 2025-06-02 ASSESSMENT — PAIN SCALES - GENERAL: PAINLEVEL_OUTOF10: NO PAIN (0)

## 2025-06-02 NOTE — LETTER
6/2/2025       RE: Jacinto Flannery  [address]  [facility] [address]     Dear Colleague,    Thank you for referring your patient, Jacinto Flannery, to the [facility] DERMATOLOGY CLINIC [place] [facility]. Please see a copy of my visit note below.    [facility] Dermatology Note  Encounter Date: Jun 2, 2025  Office Visit     Dermatology Problem List:  # NMSC  - SCC, left (dorsal) forearm, s/p excision 8/18/2015  - SCC, right dorsal hand, s/p Mohs 3/18/2015  # Compound nevus with moderate dysplasia, mid upper back   - s/p biopsy 10/16/2015  # Actinic keratosis  - s/p cryotherapy 07/20/2016, 03/06/19, 04/12/2023  - efudex in November 2016 (used for 19 days), pt had a very robust response  - pt given hydrocortisone 2.5% cream BID PRN due to severe response and told to stop efudex  # Unknown skin eruption penis. Resolved 7/5/17  - s/p triamcinolone cream, nystatin cream, mupirocin, and valtrex  - s/p biopsy showing hemorraghic scale crust and ulceration 2/3/2015  # Drug exanthem, 2/2 to tamsulosin  - s/p biopsy showing interface dermatitis 12/29/2014  - s/p lidex initiated 1/8/2015  - s/p clobetasol initiated 12/29/2014-improved  # Traumatic tattoo  - Central forehead, measured 1X1.5cm on 10/16/17  # Rosacea  - Current Tx: Metronidazole cream  _____________________________________    Assessment & Plan:   # AK x 4, bilateral helices -- we will use cryotherapy for them at his next visit given that he is going on a trip now.     # History of skin cancer- NERD    # Benign findings: seborrheic keratoses, cherry angiomas, lentigo and nevi    # ISK, L lateral neck -- he prefers no further management     Follow-up: 6 month(s) in-person, or earlier for new or changing lesions    Jose Ramon Bush MD    Staff:     Anjelica LIZ I, Anjelica Quarles MD, saw this patient with the resident and agree with the resident s findings and plan of care  as documented in the resident s note.    ____________________________________________    CC: Skin Check (FBSE, spot of concern on neck behind ear near hair line. Pain with irritation)    HPI:  Mr. Jacinto Flannery is a(n) 84 year old male who presents today as a return patient for a skin check. He has spot that he found on his L neck that he realized while shaving. It has not bled, burned, or blistered. Patient is otherwise feeling well, without additional skin concerns.     Labs Reviewed:  N/A    Physical Exam:  Vitals: There were no vitals taken for this visit.  SKIN: Total skin excluding the undergarment areas was performed. The exam included the head/face, neck, both arms, chest, back, abdomen, both legs, digits and/or nails.   - a few gritty pink papules of the bilateral helices   - an excoriated papule of the L lateral neck  - brown even colored macules of the trunk and extremities   - no other lesions of concern on areas examined.     Medications:  Current Outpatient Medications   Medication Sig Dispense Refill     alfuzosin ER (UROXATRAL) 10 MG 24 hr tablet Take 1 tablet (10 mg) by mouth daily. 90 tablet 3     aspirin 81 MG tablet Take 81 mg by mouth daily       carBAMazepine (CARBATROL) 200 MG 12 hr capsule Take 1 capsule (200 mg) by mouth 2 times daily as needed (trigeminal neuralgia). 30 capsule 3     eszopiclone (LUNESTA) 2 MG tablet Take 1 tablet (2 mg) by mouth nightly as needed for sleep (insomnia). 15 tablet 1     melatonin 1 MG CAPS        metoprolol succinate ER (TOPROL XL) 25 MG 24 hr tablet TAKE 1/2 (ONE-HALF) TABLET BY MOUTH ONCE DAILY 45 tablet 3     metroNIDAZOLE (METROCREAM) 0.75 % external cream Apply topically 2 times daily 45 g 11     nitroGLYcerin (NITROSTAT) 0.4 MG sublingual tablet Place 1 tablet (0.4 mg) under the tongue every 5 minutes as needed for chest pain. For chest pain place 1 tablet under the tongue every 5 minutes for 3 doses. If symptoms persist 5 minutes after 1st dose call  911. 30 tablet 1     simvastatin (ZOCOR) 40 MG tablet Take 1 tablet (40 mg) by mouth at bedtime. 90 tablet 3     valACYclovir (VALTREX) 1000 mg tablet Take 1 pill twice a day for 3 days at the first sign of a cold core. 6 tablet 3     Current Facility-Administered Medications   Medication Dose Route Frequency Provider Last Rate Last Admin     lidocaine 1% with EPINEPHrine 1:100,000 injection 0.5 mL  0.5 mL Intradermal Once Anjelica Quarles MD          Past Medical History:   Patient Active Problem List   Diagnosis     CAD S/P percutaneous coronary angioplasty     CAD (coronary artery disease)     BPH (benign prostatic hyperplasia)     Hyperlipidemia with target LDL less than 70     S/P coronary artery stent placement     GERD (gastroesophageal reflux disease)     S/P TURP     Dermatitis     AK (actinic keratosis)     SCC (squamous cell carcinoma)     SK (seborrheic keratosis)     EIC (epidermal inclusion cyst)     History of squamous cell carcinoma     Seborrheic keratosis     Inflamed seborrheic keratosis     History of dysplastic nevus     Varicose veins     Medication reaction, initial encounter     HSV (herpes simplex virus) infection     Hypertension     Insomnia     Thrombocytopenia     Benign prostatic hyperplasia with urinary obstruction     Hypertrophy of prostate without urinary obstruction and other lower urinary tract symptoms (LUTS)     Atherosclerosis of coronary artery     Recurrent coronary arteriosclerosis after percutaneous transluminal coronary angioplasty     History of atypical nevus     Hyperlipidemia     History of transurethral resection of prostate     Presence of coronary angioplasty implant and graft     Squamous cell carcinoma of skin     Varicose veins of other specified sites     Past Medical History:   Diagnosis Date     BPH (benign prostatic hyperplasia)      CAD (coronary artery disease)     MI 1989, CABG 1990, 11/2014 PCI to LM, LCx and RCA     Conductive hearing loss  Unknown.     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     Skin disease      Squamous cell carcinoma        CC Anjelica Quarles MD  [address] [facility] [address]  [place] [address] on close of this encounter.     Again, thank you for allowing me to participate in the care of your patient.      Sincerely,    Anjelica Quarles MD

## 2025-06-02 NOTE — PROGRESS NOTES
[facility] Dermatology Note  Encounter Date: Jun 2, 2025  Office Visit     Dermatology Problem List:  # NMSC  - SCC, left (dorsal) forearm, s/p excision 8/18/2015  - SCC, right dorsal hand, s/p Mohs 3/18/2015  # Compound nevus with moderate dysplasia, mid upper back   - s/p biopsy 10/16/2015  # Actinic keratosis  - s/p cryotherapy 07/20/2016, 03/06/19, 04/12/2023  - efudex in November 2016 (used for 19 days), pt had a very robust response  - pt given hydrocortisone 2.5% cream BID PRN due to severe response and told to stop efudex  # Unknown skin eruption penis. Resolved 7/5/17  - s/p triamcinolone cream, nystatin cream, mupirocin, and valtrex  - s/p biopsy showing hemorraghic scale crust and ulceration 2/3/2015  # Drug exanthem, 2/2 to tamsulosin  - s/p biopsy showing interface dermatitis 12/29/2014  - s/p lidex initiated 1/8/2015  - s/p clobetasol initiated 12/29/2014-improved  # Traumatic tattoo  - Central forehead, measured 1X1.5cm on 10/16/17  # Rosacea  - Current Tx: Metronidazole cream  _____________________________________    Assessment & Plan:   # AK x 4, bilateral helices -- we will use cryotherapy for them at his next visit given that he is going on a trip now.     # History of skin cancer- NERD    # Benign findings: seborrheic keratoses, cherry angiomas, lentigo and nevi    # ISK, L lateral neck -- he prefers no further management     Follow-up: 6 month(s) in-person, or earlier for new or changing lesions    Jose Ramon Bush MD    Staff:     Anjelica Quarles MD  I, Anjelica Quarles MD, saw this patient with the resident and agree with the resident s findings and plan of care as documented in the resident s note.    ____________________________________________    CC: Skin Check (FBSE, spot of concern on neck behind ear near hair line. Pain with irritation)    HPI:  Mr. Jacinto Flannery is a(n) 84 year old male who presents today as a return patient for a skin check. He has spot that he  found on his L neck that he realized while shaving. It has not bled, burned, or blistered. Patient is otherwise feeling well, without additional skin concerns.     Labs Reviewed:  N/A    Physical Exam:  Vitals: There were no vitals taken for this visit.  SKIN: Total skin excluding the undergarment areas was performed. The exam included the head/face, neck, both arms, chest, back, abdomen, both legs, digits and/or nails.   - a few gritty pink papules of the bilateral helices   - an excoriated papule of the L lateral neck  - brown even colored macules of the trunk and extremities   - no other lesions of concern on areas examined.     Medications:  Current Outpatient Medications   Medication Sig Dispense Refill    alfuzosin ER (UROXATRAL) 10 MG 24 hr tablet Take 1 tablet (10 mg) by mouth daily. 90 tablet 3    aspirin 81 MG tablet Take 81 mg by mouth daily      carBAMazepine (CARBATROL) 200 MG 12 hr capsule Take 1 capsule (200 mg) by mouth 2 times daily as needed (trigeminal neuralgia). 30 capsule 3    eszopiclone (LUNESTA) 2 MG tablet Take 1 tablet (2 mg) by mouth nightly as needed for sleep (insomnia). 15 tablet 1    melatonin 1 MG CAPS       metoprolol succinate ER (TOPROL XL) 25 MG 24 hr tablet TAKE 1/2 (ONE-HALF) TABLET BY MOUTH ONCE DAILY 45 tablet 3    metroNIDAZOLE (METROCREAM) 0.75 % external cream Apply topically 2 times daily 45 g 11    nitroGLYcerin (NITROSTAT) 0.4 MG sublingual tablet Place 1 tablet (0.4 mg) under the tongue every 5 minutes as needed for chest pain. For chest pain place 1 tablet under the tongue every 5 minutes for 3 doses. If symptoms persist 5 minutes after 1st dose call 911. 30 tablet 1    simvastatin (ZOCOR) 40 MG tablet Take 1 tablet (40 mg) by mouth at bedtime. 90 tablet 3    valACYclovir (VALTREX) 1000 mg tablet Take 1 pill twice a day for 3 days at the first sign of a cold core. 6 tablet 3     Current Facility-Administered Medications   Medication Dose Route Frequency Provider Last  Rate Last Admin    lidocaine 1% with EPINEPHrine 1:100,000 injection 0.5 mL  0.5 mL Intradermal Once Anjelica Quarles MD          Past Medical History:   Patient Active Problem List   Diagnosis    CAD S/P percutaneous coronary angioplasty    CAD (coronary artery disease)    BPH (benign prostatic hyperplasia)    Hyperlipidemia with target LDL less than 70    S/P coronary artery stent placement    GERD (gastroesophageal reflux disease)    S/P TURP    Dermatitis    AK (actinic keratosis)    SCC (squamous cell carcinoma)    SK (seborrheic keratosis)    EIC (epidermal inclusion cyst)    History of squamous cell carcinoma    Seborrheic keratosis    Inflamed seborrheic keratosis    History of dysplastic nevus    Varicose veins    Medication reaction, initial encounter    HSV (herpes simplex virus) infection    Hypertension    Insomnia    Thrombocytopenia    Benign prostatic hyperplasia with urinary obstruction    Hypertrophy of prostate without urinary obstruction and other lower urinary tract symptoms (LUTS)    Atherosclerosis of coronary artery    Recurrent coronary arteriosclerosis after percutaneous transluminal coronary angioplasty    History of atypical nevus    Hyperlipidemia    History of transurethral resection of prostate    Presence of coronary angioplasty implant and graft    Squamous cell carcinoma of skin    Varicose veins of other specified sites     Past Medical History:   Diagnosis Date    BPH (benign prostatic hyperplasia)     CAD (coronary artery disease)     MI 1989, CABG 1990, 11/2014 PCI to LM, LCx and RCA    Conductive hearing loss Unknown.    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    Skin disease     Squamous cell carcinoma        CC Anjelica Quarles MD  [address] [facility] [address]  [place] [address] on close of this encounter.

## 2025-06-02 NOTE — PATIENT INSTRUCTIONS
We will see you in 6 months. We can freeze some spots on the ears and your neck at your next visit.

## 2025-06-02 NOTE — NURSING NOTE
Dermatology Rooming Note    Jacinto Flannery's goals for this visit include:   Chief Complaint   Patient presents with    Skin Check     FBSE, spot of concern on neck behind ear near hair line. Pain with irritation     Marjorie Alvarado - EMT

## (undated) RX ORDER — CIPROFLOXACIN 500 MG/1
TABLET, FILM COATED ORAL
Status: DISPENSED
Start: 2018-02-21